# Patient Record
Sex: FEMALE | Race: WHITE | Employment: OTHER | RURAL
[De-identification: names, ages, dates, MRNs, and addresses within clinical notes are randomized per-mention and may not be internally consistent; named-entity substitution may affect disease eponyms.]

---

## 2017-02-01 ENCOUNTER — OFFICE VISIT (OUTPATIENT)
Dept: FAMILY MEDICINE CLINIC | Age: 72
End: 2017-02-01

## 2017-02-01 VITALS
HEIGHT: 67 IN | SYSTOLIC BLOOD PRESSURE: 122 MMHG | TEMPERATURE: 97 F | DIASTOLIC BLOOD PRESSURE: 60 MMHG | RESPIRATION RATE: 16 BRPM | BODY MASS INDEX: 30.29 KG/M2 | OXYGEN SATURATION: 97 % | WEIGHT: 193 LBS | HEART RATE: 63 BPM

## 2017-02-01 DIAGNOSIS — Z00.00 MEDICARE ANNUAL WELLNESS VISIT, SUBSEQUENT: Primary | ICD-10-CM

## 2017-02-01 DIAGNOSIS — Z13.39 SCREENING FOR ALCOHOLISM: ICD-10-CM

## 2017-02-01 NOTE — MR AVS SNAPSHOT
Visit Information Date & Time Provider Department Dept. Phone Encounter #  
 2/1/2017 11:30 AM Bal Velazquez MD [address] [place] [address] [id number] Follow-up Instructions Return in about 3 months (around 5/1/2017). Your Appointments 4/25/2017 10:10 AM  
ROUTINE CARE with Bal Velazquez MD  
[address]) Appt Note: 4 month f/u CHOL, ASTHMA, (fasting); provider cancellation on 04/18/2017-letter mailed [address] [address]  
[facility] [address] [address] Upcoming Health Maintenance Date Due  
 MEDICARE YEARLY EXAM 10/13/2016 COLONOSCOPY 1/16/2017 GLAUCOMA SCREENING Q2Y 7/6/2018 BREAST CANCER SCRN MAMMOGRAM 10/30/2018 Bone Densitometry 10/31/2019 DTaP/Tdap/Td series (2 - Td) 7/13/2021 Allergies as of 2/1/2017  Review Complete On: 2/1/2017 By: Bal Velazquez MD  
 No Known Allergies Current Immunizations  Reviewed on 6/6/2016 Name Date H1N1 FLU VACCINE 11/25/2009 Influenza Vaccine 10/13/2015, 10/3/2013 Influenza Vaccine (Quad) PF 12/13/2016, 11/20/2014 Influenza Vaccine Split 10/8/2012, 12/15/2011 Influenza Vaccine Whole 11/3/2010, 10/8/2009 Pneumococcal Conjugate (PCV-13) 11/10/2015 Pneumococcal Vaccine (Pcv) 10/1/2006 Pneumococcal Vaccine (Unspecified Type) 2/21/2011 TDAP Vaccine 7/13/2011 Zoster Vaccine, Live 1/21/2013 Not reviewed this visit You Were Diagnosed With   
  
 Codes Comments Medicare annual wellness visit, subsequent    -  Primary ICD-10-CM: Z00.00 ICD-9-CM: V70.0 Screening for alcoholism     ICD-10-CM: Z13.89 ICD-9-CM: V79.1 Vitals BP Pulse Temp Resp Height(growth percentile) Weight(growth percentile)  122/60 (BP 1 Location: Right arm, BP Patient Position: Sitting) 63 97 °F (36.1 °C) 16 5' 6.5\" (1.689 m) 193 lb (87.5 kg)  
 SpO2 BMI OB Status Smoking Status 97% 30.68 kg/m2 Hysterectomy Never Smoker Vitals History BMI and BSA Data Body Mass Index Body Surface Area  
 30.68 kg/m 2 2.03 m 2 Preferred Pharmacy Pharmacy Name Phone [address] [place], [place] - [address] [phone] Your Updated Medication List  
  
   
This list is accurate as of: 2/1/17 12:10 PM.  Always use your most recent med list.  
  
  
  
  
 * albuterol 90 mcg/actuation inhaler Commonly known as:  PROVENTIL HFA, VENTOLIN HFA, PROAIR HFA Take 2 Puffs by inhalation every four (4) hours as needed for Wheezing. Indications: ACUTE ASTHMA ATTACK * albuterol sulfate 2.5 mg/0.5 mL Nebu nebulizer solution Commonly known as:  PROVENTIL;VENTOLIN  
0.5 mL by Nebulization route every four (4) hours as needed for Wheezing. Indications: ACUTE ASTHMA ATTACK, BRONCHOSPASM PREVENTION  
  
 aspirin delayed-release 81 mg tablet Take 81 mg by mouth daily. ISAI-600 PO Take  by mouth. FISH OIL 1,000 mg Cap Generic drug:  omega-3 fatty acids-vitamin e Take 1 Cap by mouth two (2) times a day. fluticasone 110 mcg/actuation inhaler Commonly known as:  FLOVENT HFA Take 1 Puff by inhalation every twelve (12) hours. Indications: BRONCHIAL ASTHMA  
  
 mometasone 50 mcg/actuation nasal spray Commonly known as:  NASONEX  
2 Sprays by Both Nostrils route daily. niacin  mg tablet Commonly known as:  Barros Stalling Take 1 Tab by mouth daily. Indications: HYPERCHOLESTEROLEMIA Omeprazole delayed release 20 mg tablet Commonly known as:  PRILOSEC D/R Take 1 Tab by mouth daily. simvastatin 40 mg tablet Commonly known as:  ZOCOR Take 1 Tab by mouth nightly. traMADol 50 mg tablet Commonly known as:  ULTRAM  
Take 1 tablet by mouth every eight (8) hours as needed for Pain.  
  
 triamcinolone acetonide 0.1 % topical cream  
Commonly known as:  KENALOG Apply to arms bid when itching. VITAMIN B-100 COMPLEX PO Take  by mouth. VITAMIN D 2,000 unit Cap capsule Generic drug:  Cholecalciferol (Vitamin D3) Take  by mouth. * Notice: This list has 2 medication(s) that are the same as other medications prescribed for you. Read the directions carefully, and ask your doctor or other care provider to review them with you. We Performed the Following [place] ANNUAL ALCOHOL SCREEN 15 MIN [id number] [facility]] [id number] [facility] [ [facility]] Follow-up Instructions Return in about 3 months (around 5/1/2017). Patient Instructions Well Visit, Over 72: Care Instructions Your Care Instructions Physical exams can help you stay healthy. Your doctor has checked your overall health and may have suggested ways to take good care of yourself. He or she also may have recommended tests. At home, you can help prevent illness with healthy eating, regular exercise, and other steps. Follow-up care is a key part of your treatment and safety. Be sure to make and go to all appointments, and call your doctor if you are having problems. It's also a good idea to know your test results and keep a list of the medicines you take. How can you care for yourself at home? · Reach and stay at a healthy weight. This will lower your risk for many problems, such as obesity, diabetes, heart disease, and high blood pressure. · Get at least 30 minutes of exercise on most days of the week. Walking is a good choice. You also may want to do other activities, such as running, swimming, cycling, or playing tennis or team sports. · Do not smoke. Smoking can make health problems worse. If you need help quitting, talk to your doctor about stop-smoking programs and medicines. These can increase your chances of quitting for good. · Protect your skin from too much sun.  When you're outdoors from 10 a.m. to 4 p.m., stay in the shade or cover up with clothing and a hat with a wide brim. Wear sunglasses that block UV rays. Even when it's cloudy, put broad-spectrum sunscreen (SPF 30 or higher) on any exposed skin. · See a dentist one or two times a year for checkups and to have your teeth cleaned. · Wear a seat belt in the car. · Limit alcohol to 2 drinks a day for men and 1 drink a day for women. Too much alcohol can cause health problems. Follow your doctor's advice about when to have certain tests. These tests can spot problems early. For men and women · Cholesterol. Your doctor will tell you how often to have this done based on your overall health and other things that can increase your risk for heart attack and stroke. · Blood pressure. Have your blood pressure checked during a routine doctor visit. Your doctor will tell you how often to check your blood pressure based on your age, your blood pressure results, and other factors. · Diabetes. Ask your doctor whether you should have tests for diabetes. · Vision. Experts recommend that you have yearly exams for glaucoma and other age-related eye problems. · Hearing. Tell your doctor if you notice any change in your hearing. You can have tests to find out how well you hear. · Colon cancer tests. Keep having colon cancer tests as your doctor recommends. You can have one of several types of tests. · Heart attack and stroke risk. At least every 4 to 6 years, you should have your risk for heart attack and stroke assessed. Your doctor uses factors such as your age, blood pressure, cholesterol, and whether you smoke or have diabetes to show what your risk for a heart attack or stroke is over the next 10 years. · Osteoporosis. Talk to your doctor about whether you should have a bone density test to find out whether you have thinning bones. Also ask your doctor about whether you should take calcium and vitamin D supplements. For women · Pap test and pelvic exam. You may no longer need a Pap test. Talk with your doctor about whether to stop or continue to have Pap tests. · Breast exam and mammogram. Ask how often you should have a mammogram, which is an X-ray of your breasts. A mammogram can spot breast cancer before it can be felt and when it is easiest to treat. · Thyroid disease. Talk to your doctor about whether to have your thyroid checked as part of a regular physical exam. Women have an increased chance of a thyroid problem. For men · Prostate exam. Talk to your doctor about whether you should have a blood test (called a PSA test) for prostate cancer. Experts disagree on whether men should have this test. Some experts recommend that you discuss the benefits and risks of the test with your doctor. · Abdominal aortic aneurysm. Ask your doctor whether you should have a test to check for an aneurysm. You may need a test if you ever smoked or if your parent, brother, sister, or child has had an aneurysm. When should you call for help? Watch closely for changes in your health, and be sure to contact your doctor if you have any problems or symptoms that concern you. Where can you learn more? Go to http://chuck-hank.info/. Enter N846 in the search box to learn more about \"Well Visit, Over 65: Care Instructions. \" Current as of: July 19, 2016 Content Version: 11.1 © 5982-1250 [employer]. Care instructions adapted under license by [employer] (which disclaims liability or warranty for this information). If you have questions about a medical condition or this instruction, always ask your healthcare professional. [place] [address] any warranty or liability for your use of this information. Introducing [facility]! Dear Elsa Barillas: 
Thank you for requesting a Dataslide account. Our records indicate that you have previously registered for a Dataslide account but its currently inactive. Please call our Dataslide support line at [phone]. Additional Information If you have questions, please visit the Frequently Asked Questions section of the [employer] website at [email or web address] Remember, [employer] is NOT to be used for urgent needs. For medical emergencies, dial 911. Now available from your iPhone and Android! Please provide this summary of care documentation to your next provider. Your primary care clinician is listed as [address]. If you have any questions after today's visit, please call [phone].

## 2017-02-01 NOTE — PROGRESS NOTES
Reviewed record in preparation for visit and have necessary documentation  Pt did not bring medication to office visit for review   Advanced Directives, Living Will on file and info current  opportunity was given for questions  Goals that were addressed and/or need to be completed during or after this appointment include   Health Maintenance Due   Topic Date Due    MEDICARE YEARLY EXAM  10/13/2016    COLONOSCOPY  01/16/2017

## 2017-02-01 NOTE — PROGRESS NOTES
[address], [address] [place] [address]  [phone]           Date of visit: 2/1/2017     Jairo Tariq MD    This is an Subsequent Medicare Annual Wellness Visit (AWV), (Performed more than 12 months after effective date of Medicare Part B enrollment and 12 months after last preventive visit, Once in a lifetime)    I have reviewed the patient's medical history in detail and updated the computerized patient record. La Siegel is a 66 y.o. female   History obtained from: the patient. Concerns today   (Patient understands that medical problems addressed today may incur additional cost as this is a preventive visit)    History     Patient Active Problem List   Diagnosis Code    Hypercholesteremia E78.00    CVA (cerebral vascular accident) ([address] I63.9    Asthma J45.909    Asthmatic bronchitis J45.909    Bronchitis, mucopurulent recurrent ([address] J41.1    Vitamin D deficiency E55.9    Arthritis pain M19.90     Past Medical History   Diagnosis Date    Asthma 4/5/2010    Bronchitis, mucopurulent recurrent ([address] 5/14/2010    CVA (cerebral vascular accident) ([address] 4/5/2010    Hypercholesteremia 4/5/2010      Past Surgical History   Procedure Laterality Date    Hx hysterectomy      Hx tonsillectomy       No Known Allergies  Current Outpatient Prescriptions   Medication Sig Dispense Refill    niacin ER (NIASPAN) 500 mg tablet Take 1 Tab by mouth daily. Indications: HYPERCHOLESTEROLEMIA 90 Tab 3    simvastatin (ZOCOR) 40 mg tablet Take 1 Tab by mouth nightly. 90 Tab 3    Omeprazole delayed release (PRILOSEC D/R) 20 mg tablet Take 1 Tab by mouth daily. 30 Tab 5    albuterol (PROVENTIL HFA, VENTOLIN HFA, PROAIR HFA) 90 mcg/actuation inhaler Take 2 Puffs by inhalation every four (4) hours as needed for Wheezing.  Indications: ACUTE ASTHMA ATTACK 1 Inhaler 5    albuterol sulfate (PROVENTIL;VENTOLIN) 2.5 mg/0.5 mL nebu nebulizer solution 0.5 mL by Nebulization route every four (4) hours as needed for Wheezing. Indications: ACUTE ASTHMA ATTACK, BRONCHOSPASM PREVENTION 20 mL 0    fluticasone (FLOVENT HFA) 110 mcg/actuation inhaler Take 1 Puff by inhalation every twelve (12) hours. Indications: BRONCHIAL ASTHMA 1 Inhaler 5    omega-3 fatty acids-vitamin e (FISH OIL) 1,000 mg cap Take 1 Cap by mouth two (2) times a day.  triamcinolone acetonide (KENALOG) 0.1 % topical cream Apply to arms bid when itching. 454 g 0    aspirin delayed-release 81 mg tablet Take 81 mg by mouth daily.  CALCIUM CARBONATE (ISAI-600 PO) Take  by mouth.  Cholecalciferol, Vitamin D3, (VITAMIN D) 2,000 unit Cap Take  by mouth.  VITAMIN B COMPLEX (VITAMIN B-100 COMPLEX PO) Take  by mouth.  mometasone (NASONEX) 50 mcg/actuation nasal spray 2 Sprays by Both Nostrils route daily. 1 Container 6    traMADol (ULTRAM) 50 mg tablet Take 1 tablet by mouth every eight (8) hours as needed for Pain. 30 tablet 3     Family History   Problem Relation Age of Onset    Stroke Mother     Heart Disease Father      Social History   Substance Use Topics    Smoking status: Never Smoker    Smokeless tobacco: Never Used    Alcohol use No       Specialists/Care Team   Sunitha Garcia has established care with the following healthcare providers:  Patient Care Team:  Bal Velazquez MD as PCP - [place] [address]     Demographics   female  66 y.o.     General Health Questions   -During the past 4 weeks:   -how would you rate your health in general? Good   -how often have you been bothered by feeling dizzy when standing up? never   -how much have you been bothered by bodily pain? mildly   -Have you noticed any hearing difficulties? no   -has your physical and emotional health limited your social activities with family or friends? no    Emotional Health Questions   -Do you have a history of depression, anxiety, or emotional problems? no  -Over the past 2 weeks, have you felt down, depressed or hopeless? no  -Over the past 2 weeks, have you felt little interest or pleasure in doing things? no    Health Habits   Please describe your diet habits: B grade  Do you get 5 servings of fruits or vegetables daily? no  Do you exercise regularly? no    Alcohol Risk Factor Screening: On any occasion during the past 3 months, have you had more than 4 drinks containing alcohol? No     Do you average more than 14 drinks per week? No       Activities of Daily Living and Functional Status   -Do you need help with eating, walking, dressing, bathing, toileting, the phone, transportation, shopping, preparing meals, housework, laundry, medications or managing money? no  -In the past four weeks, was someone available to help you if you needed and wanted help with anything? yes  -Are you confident are you that you can control and manage most of your health problems? yes  -Have you been given information to help you keep track of your medications? yes  -How often do you have trouble taking your medications as prescribed? occasionally    Fall Risk and Home Safety   Have you fallen 2 or more times in the past year? no  Does your home have rugs in the hallway, lack grab bars in the bathroom, lack handrails on the stairs or have poor lighting? yes  Do you have smoke detectors and check them regularly? yes  Do you have difficulties driving a car? no  Do you always fasten your seat belt when you are in a car? yes    Review of Systems (if indicated for problems addressed today)   Review of Systems   Constitutional: Negative. Negative for chills, fever, malaise/fatigue and weight loss. HENT: Negative. Negative for hearing loss. Eyes: Negative. Negative for blurred vision and double vision. Respiratory: Negative. Negative for cough, hemoptysis, sputum production and shortness of breath. Cardiovascular: Negative. Negative for chest pain, palpitations and orthopnea. Gastrointestinal: Negative. Negative for abdominal pain, blood in stool, heartburn, nausea and vomiting. Genitourinary: Negative. Negative for dysuria, frequency and urgency. Musculoskeletal: Negative. Negative for back pain, myalgias and neck pain. Skin: Negative. Negative for rash. Neurological: Negative. Negative for dizziness, tingling, tremors, weakness and headaches. Endo/Heme/Allergies: Negative. Psychiatric/Behavioral: Negative. Negative for depression. Physical Examination     Wt Readings from Last 3 Encounters:   02/01/17 193 lb (87.5 kg)   12/13/16 192 lb (87.1 kg)   06/06/16 192 lb 9.6 oz (87.4 kg)     Temp Readings from Last 3 Encounters:   02/01/17 97 °F (36.1 °C)   12/13/16 97.9 °F (36.6 °C) (Oral)   06/06/16 97.9 °F (36.6 °C) (Oral)     BP Readings from Last 3 Encounters:   02/01/17 122/60   12/13/16 135/76   06/06/16 115/76     Pulse Readings from Last 3 Encounters:   02/01/17 63   12/13/16 65   06/06/16 66       Body mass index is 30.68 kg/(m^2). No exam data present  Was the patient's timed Up & Go test unsteady or longer than 10 seconds? no    Evaluation of Cognitive Function   Mood/affect:  happy  Orientation: Person, Place, Time and Situation  Appearance: age appropriate and casually dressed  Family member/caregiver input: no  MMSE 100% although she feels forgetful at times. She will contact me for neuropsychiatric testing if she feels like her memory is getting worse. Additional exam if indicated for problems addressed today:  Physical Exam   Constitutional: She is oriented to person, place, and time. She appears well-developed and well-nourished. No distress. HENT:   Head: Normocephalic and atraumatic. Mouth/Throat: Oropharynx is clear and moist.   Eyes: Conjunctivae are normal. No scleral icterus. Revision is 20/50 in the right eye and 20/25 in the left eye with her glasses in the chart and arms length.   She apparently has some cataracts scarring in the right eye after cataract removal.  This is clearly affected her vision in the right eye. Neck: No thyromegaly present. No carotid bruit. Cardiovascular: Normal rate, regular rhythm and normal heart sounds. Exam reveals no gallop and no friction rub. No murmur heard. Pulmonary/Chest: Effort normal and breath sounds normal. She has no wheezes. She has no rales. Abdominal: Soft. Bowel sounds are normal. She exhibits no distension. There is no tenderness. There is no rebound and no guarding. Musculoskeletal: She exhibits no edema. Lymphadenopathy:     She has no cervical adenopathy. Neurological: She is alert and oriented to person, place, and time. Skin: Skin is warm and dry. No rash noted. She is not diaphoretic. Psychiatric: She has a normal mood and affect. Her behavior is normal. Thought content normal.   Nursing note and vitals reviewed. Advice/Referrals/Counseling (as indicated)   Education and counseling provided for any problems identified above:     Preventive Services     (Preventive care checklist to be included in patient instructions)  Discussed today Done Previously Not Needed    x   Pneumococcal vaccines    x  Flu vaccine     x Hepatitis B vaccine (if at risk)      Shingles vaccine    x  TDAP vaccine    x  Mammogram     x Pap smear   x   Colorectal cancer screening     x Low-dose CT for lung cancer screening    x  Bone density test    x  Glaucoma screening    x  Cholesterol test    x  Diabetes screening test      x Diabetes self-management class     x Nutritionist referral for diabetes or renal disease     Discussion of Advance Directive   Discussed with Agustin Rogers. Jameson Baumgarten her ability to prepare and advance directive in the case that an injury or illness causes her to be unable to make health care decisions. Assessment/Plan   Z00.00    ICD-10-CM ICD-9-CM    1. Medicare annual wellness visit, subsequent Z00.00 V70.0 [place] ANNUAL ALCOHOL SCREEN 15 MIN      MN DEPRESSION SCREEN ANNUAL   2.  Screening for alcoholism Z13.89 V79.1        Orders Placed This Encounter    ME ANNUAL ALCOHOL SCREEN [address]       Patient Care Team:  Ursula San MD as PCP - General    Follow-up Disposition:  Return in about 3 months (around 5/1/2017).     Future Appointments  Date Time Provider Department Center   4/25/2017 10:10 AM MD Brittany Mcgee MD

## 2017-02-01 NOTE — PATIENT INSTRUCTIONS

## 2017-02-09 ENCOUNTER — TELEPHONE (OUTPATIENT)
Dept: FAMILY MEDICINE CLINIC | Age: 72
End: 2017-02-09

## 2017-02-09 DIAGNOSIS — Z12.11 COLON CANCER SCREENING: Primary | ICD-10-CM

## 2017-02-09 NOTE — TELEPHONE ENCOUNTER
Patient called requesting that we refer her to Dr. Shanti York for a routine colonoscopy. She can be reached at [phone].

## 2017-04-25 ENCOUNTER — OFFICE VISIT (OUTPATIENT)
Dept: FAMILY MEDICINE CLINIC | Age: 72
End: 2017-04-25

## 2017-04-25 VITALS
RESPIRATION RATE: 20 BRPM | TEMPERATURE: 98.2 F | DIASTOLIC BLOOD PRESSURE: 69 MMHG | HEIGHT: 67 IN | WEIGHT: 194.6 LBS | BODY MASS INDEX: 30.54 KG/M2 | HEART RATE: 64 BPM | OXYGEN SATURATION: 96 % | SYSTOLIC BLOOD PRESSURE: 145 MMHG

## 2017-04-25 DIAGNOSIS — E78.00 HYPERCHOLESTEREMIA: Primary | Chronic | ICD-10-CM

## 2017-04-25 DIAGNOSIS — J45.20 MILD INTERMITTENT ASTHMA WITHOUT COMPLICATION: Chronic | ICD-10-CM

## 2017-04-25 DIAGNOSIS — I63.9 CEREBROVASCULAR ACCIDENT (CVA), UNSPECIFIED MECHANISM (HCC): ICD-10-CM

## 2017-04-25 DIAGNOSIS — E55.9 VITAMIN D DEFICIENCY: ICD-10-CM

## 2017-04-25 NOTE — PATIENT INSTRUCTIONS
Cholesterol and Triglycerides Tests: About These Tests  What are they? Cholesterol and triglycerides tests measure the amount of fats in your blood. These fats have both \"good\" (HDL) and \"bad\" (LDL) cholesterol. Why are these tests done? These tests are done to help find out your risk of a heart attack and stroke. They can help your doctor find out how well medicine is working for some health problems. How can you prepare for these tests? · Your doctor may tell you to fast before your tests. This means that you do not eat or drink anything except water for 9 to 12 hours before the tests. In most cases, you can take your medicines with water the morning of the test.  · Do not eat high-fat foods the night before the tests. · Do not drink alcohol or do intense exercise the night before the tests. · Be sure to tell your doctor about all the over-the-counter and prescription medicines and herbs or other supplements you take. They can affect the results of these tests. What happens during these tests? A health professional takes a sample of your blood. What else should you know about these tests? Your cholesterol levels can help your doctor find out your risk for having a heart attack or stroke. But it's not just about your cholesterol. Your doctor uses your cholesterol levels plus other things to calculate your risk. These include:  · Your blood pressure. · Whether or not you have diabetes. · Your age, sex, and race. · Whether or not you smoke. You and your doctor can talk about whether you need to lower your risk and what treatment is best for you. Where can you learn more? Go to http://chuck-hank.info/. Enter S822 in the search box to learn more about \"Cholesterol and Triglycerides Tests: About These Tests. \"  Current as of: June 30, 2016  Content Version: 11.2  © 8209-5377 [employer].  Care instructions adapted under license by [employer] (which disclaims liability or warranty for this information). If you have questions about a medical condition or this instruction, always ask your healthcare professional. [address] any warranty or liability for your use of this information.

## 2017-04-25 NOTE — MR AVS SNAPSHOT
Visit Information Date & Time Provider Department Dept. Phone Encounter #  
 4/25/2017 10:10 AM Rah Goodman MD [address] [id number] Follow-up Instructions Return in about 6 months (around 10/25/2017). Upcoming Health Maintenance Date Due  
 MEDICARE YEARLY EXAM 2/2/2018 GLAUCOMA SCREENING Q2Y 7/6/2018 BREAST CANCER SCRN MAMMOGRAM 10/30/2018 Bone Densitometry 10/31/2019 DTaP/Tdap/Td series (2 - Td) 7/13/2021 COLONOSCOPY 3/17/2027 Allergies as of 4/25/2017  Review Complete On: 4/25/2017 By: Gucci Jones No Known Allergies Current Immunizations  Reviewed on 6/6/2016 Name Date H1N1 FLU VACCINE 11/25/2009 Influenza Vaccine 10/13/2015, 10/3/2013 Influenza Vaccine (Quad) PF 12/13/2016, 11/20/2014 Influenza Vaccine Split 10/8/2012, 12/15/2011 Influenza Vaccine Whole 11/3/2010, 10/8/2009 Pneumococcal Conjugate (PCV-13) 11/10/2015 Pneumococcal Vaccine (Pcv) 10/1/2006 Pneumococcal Vaccine (Unspecified Type) 2/21/2011 TDAP Vaccine 7/13/2011 Zoster Vaccine, Live 1/21/2013 Not reviewed this visit You Were Diagnosed With   
  
 Codes Comments Hypercholesteremia    -  Primary ICD-10-CM: E78.00 ICD-9-CM: 272.0 Mild intermittent asthma without complication     KWA-83-KU: J45.20 ICD-9-CM: 493.90 Vitamin D deficiency     ICD-10-CM: E55.9 ICD-9-CM: 268.9 Cerebrovascular accident (CVA), unspecified mechanism ([facility]     ICD-10-CM: I63.9 ICD-9-CM: 434.91 Vitals BP Pulse Temp Resp Height(growth percentile) Weight(growth percentile) 145/69 64 98.2 °F (36.8 °C) (Oral) 20 5' 6.5\" (1.689 m) 194 lb 9.6 oz (88.3 kg) SpO2 BMI OB Status Smoking Status 96% 30.94 kg/m2 Hysterectomy Never Smoker Vitals History BMI and BSA Data Body Mass Index Body Surface Area 30.94 kg/m 2 2.04 m 2 Preferred Pharmacy Pharmacy Name Phone [address] [place], [place] - [address] [phone] Your Updated Medication List  
  
   
This list is accurate as of: 4/25/17 10:57 AM.  Always use your most recent med list.  
  
  
  
  
 * albuterol 90 mcg/actuation inhaler Commonly known as:  PROVENTIL HFA, VENTOLIN HFA, PROAIR HFA Take 2 Puffs by inhalation every four (4) hours as needed for Wheezing. Indications: ACUTE ASTHMA ATTACK * albuterol sulfate 2.5 mg/0.5 mL Nebu nebulizer solution Commonly known as:  PROVENTIL;VENTOLIN  
0.5 mL by Nebulization route every four (4) hours as needed for Wheezing. Indications: ACUTE ASTHMA ATTACK, BRONCHOSPASM PREVENTION  
  
 aspirin delayed-release 81 mg tablet Take 81 mg by mouth daily. ISAI-600 PO Take  by mouth. FISH OIL 1,000 mg Cap Generic drug:  omega-3 fatty acids-vitamin e Take 1 Cap by mouth two (2) times a day. fluticasone 110 mcg/actuation inhaler Commonly known as:  FLOVENT HFA Take 1 Puff by inhalation every twelve (12) hours. Indications: BRONCHIAL ASTHMA  
  
 mometasone 50 mcg/actuation nasal spray Commonly known as:  NASONEX  
2 Sprays by Both Nostrils route daily. niacin  mg tablet Commonly known as:  Samina Solum Take 1 Tab by mouth daily. Indications: HYPERCHOLESTEROLEMIA Omeprazole delayed release 20 mg tablet Commonly known as:  PRILOSEC D/R Take 1 Tab by mouth daily. simvastatin 40 mg tablet Commonly known as:  ZOCOR  
TAKE 1 TABLET BY MOUTH NIGHTLY  
  
 traMADol 50 mg tablet Commonly known as:  ULTRAM  
Take 1 tablet by mouth every eight (8) hours as needed for Pain.  
  
 triamcinolone acetonide 0.1 % topical cream  
Commonly known as:  KENALOG Apply to arms bid when itching. VITAMIN B-100 COMPLEX PO Take  by mouth. VITAMIN D 2,000 unit Cap capsule Generic drug:  Cholecalciferol (Vitamin D3) Take  by mouth. * Notice:   This list has 2 medication(s) that are the same as other medications prescribed for you. Read the directions carefully, and ask your doctor or other care provider to review them with you. We Performed the Following HEMOGLOBIN [id number] CPT(R)] LIPID PANEL [93587 CPT(R)] METABOLIC PANEL, COMPREHENSIVE [52478 CPT(R)] VITAMIN D, 25 HYDROXY [id number] CPT(R)] Follow-up Instructions Return in about 6 months (around 10/25/2017). Patient Instructions Cholesterol and Triglycerides Tests: About These Tests What are they? Cholesterol and triglycerides tests measure the amount of fats in your blood. These fats have both \"good\" (HDL) and \"bad\" (LDL) cholesterol. Why are these tests done? These tests are done to help find out your risk of a heart attack and stroke. They can help your doctor find out how well medicine is working for some health problems. How can you prepare for these tests? · Your doctor may tell you to fast before your tests. This means that you do not eat or drink anything except water for 9 to 12 hours before the tests. In most cases, you can take your medicines with water the morning of the test. 
· Do not eat high-fat foods the night before the tests. · Do not drink alcohol or do intense exercise the night before the tests. · Be sure to tell your doctor about all the over-the-counter and prescription medicines and herbs or other supplements you take. They can affect the results of these tests. What happens during these tests? A health professional takes a sample of your blood. What else should you know about these tests? Your cholesterol levels can help your doctor find out your risk for having a heart attack or stroke. But it's not just about your cholesterol. Your doctor uses your cholesterol levels plus other things to calculate your risk. These include: 
· Your blood pressure. · Whether or not you have diabetes. · Your age, sex, and race. · Whether or not you smoke.  
You and your doctor can talk about whether you need to lower your risk and what treatment is best for you. Where can you learn more? Go to http://chuck-hank.info/. Enter B384 in the search box to learn more about \"Cholesterol and Triglycerides Tests: About These Tests. \" Current as of: June 30, 2016 Content Version: 11.2 © 7300-8431 [employer]. Care instructions adapted under license by [employer] (which disclaims liability or warranty for this information). If you have questions about a medical condition or this instruction, always ask your healthcare professional. [address] any warranty or liability for your use of this information. Introducing [facility]! Dear Laurence Many: 
Thank you for requesting a Mindie account. Our records indicate that you have previously registered for a Mindie account but its currently inactive. Please call our Mindie support line at [phone]. Additional Information If you have questions, please visit the Frequently Asked Questions section of the Mindie website at [email or web address] Remember, Mindie is NOT to be used for urgent needs. For medical emergencies, dial 911. Now available from your iPhone and Android! Please provide this summary of care documentation to your next provider. Your primary care clinician is listed as [address]. If you have any questions after today's visit, please call [phone].

## 2017-04-26 LAB
25(OH)D3+25(OH)D2 SERPL-MCNC: 36.3 NG/ML (ref 30–100)
ALBUMIN SERPL-MCNC: 4.4 G/DL (ref 3.5–4.8)
ALBUMIN/GLOB SERPL: 2 {RATIO} (ref 1.2–2.2)
ALP SERPL-CCNC: 76 IU/L (ref 39–117)
ALT SERPL-CCNC: 21 IU/L (ref 0–32)
AST SERPL-CCNC: 24 IU/L (ref 0–40)
BILIRUB SERPL-MCNC: 0.8 MG/DL (ref 0–1.2)
BUN SERPL-MCNC: 16 MG/DL (ref 8–27)
BUN/CREAT SERPL: 20 (ref 12–28)
CALCIUM SERPL-MCNC: 9.2 MG/DL (ref 8.7–10.3)
CHLORIDE SERPL-SCNC: 100 MMOL/L (ref 96–106)
CHOLEST SERPL-MCNC: 174 MG/DL (ref 100–199)
CO2 SERPL-SCNC: 24 MMOL/L (ref 18–29)
CREAT SERPL-MCNC: 0.82 MG/DL (ref 0.57–1)
GLOBULIN SER CALC-MCNC: 2.2 G/DL (ref 1.5–4.5)
GLUCOSE SERPL-MCNC: 88 MG/DL (ref 65–99)
HDLC SERPL-MCNC: 60 MG/DL
HGB BLD-MCNC: 12.7 G/DL (ref 11.1–15.9)
LDLC SERPL CALC-MCNC: 96 MG/DL (ref 0–99)
POTASSIUM SERPL-SCNC: 4.7 MMOL/L (ref 3.5–5.2)
PROT SERPL-MCNC: 6.6 G/DL (ref 6–8.5)
SODIUM SERPL-SCNC: 140 MMOL/L (ref 134–144)
TRIGL SERPL-MCNC: 90 MG/DL (ref 0–149)
VLDLC SERPL CALC-MCNC: 18 MG/DL (ref 5–40)

## 2017-04-26 NOTE — PROGRESS NOTES
Progress Note    Patient: Patrizia Mtz MRN: [id number]  SSN: [id number]    YOB: 1945  Age: 66 y.o. Sex: female        Chief Complaint   Patient presents with    Cholesterol Problem    Vitamin D Deficiency    Labs         Subjective:     Encounter Diagnoses   Name Primary?  Hypercholesteremia:  Cardiovascular risks for her are: LDL goal is under 100. Currently she takes Zocor (simvastatin) , 40 mg-I have suggested that she move up to Lipitor to better control her LDL-he wants to wait until the labs come back  Lab Results   Component Value Date/Time    Cholesterol, total 187 12/13/2016 11:17 AM    HDL Cholesterol 56 12/13/2016 11:17 AM    LDL, calculated 109 12/13/2016 11:17 AM    Triglyceride 112 12/13/2016 11:17 AM    CHOL/HDL Ratio 2.6 09/21/2010 08:03 AM     Lab Results   Component Value Date/Time    ALT (SGPT) 24 12/13/2016 11:17 AM    AST (SGOT) 23 12/13/2016 11:17 AM    Alk. phosphatase 75 12/13/2016 11:17 AM    Bilirubin, total 0.8 12/13/2016 11:17 AM      Myalgias: No   Fatigue: No   Other side effects: no  Wt Readings from Last 3 Encounters:   04/25/17 194 lb 9.6 oz (88.3 kg)   02/01/17 193 lb (87.5 kg)   12/13/16 192 lb (87.1 kg)     The patient is aware of our goal to reduce or eliminate the long term problems (such as strokes and heart attacks) related to poorly controlled hyperlipidemia. Yes    Mild intermittent asthma without complication:  asthma, not currently in exacerbation. Asthma symptoms occur: infrequently. Wheezing when present is described as mild and easily relieved with rescue bronchodilator. Current limitations in activity from asthma: none. Frequency of use of quick-relief meds: rare. Medication compliance: Good  Patient does not smoke cigarettes.   Monitors Peak Flow at home: no       Vitamin D deficiency:  No sx. corrected  Lab Results   Component Value Date/Time    Vitamin D 25-Hydroxy 26 09/21/2010 08:03 AM    VITAMIN D, 25-HYDROXY 48.2 12/13/2016 11:17 AM            Cerebrovascular accident (CVA), unspecified mechanism ([address]): she has no long-term side effects from her stroke and is had no symptoms of a TIA since that time. Current and past medical information:    Current Medications after this visit[de-identified]   Current Outpatient Prescriptions   Medication Sig    simvastatin (ZOCOR) 40 mg tablet TAKE 1 TABLET BY MOUTH NIGHTLY    niacin ER (NIASPAN) 500 mg tablet Take 1 Tab by mouth daily. Indications: HYPERCHOLESTEROLEMIA    Omeprazole delayed release (PRILOSEC D/R) 20 mg tablet Take 1 Tab by mouth daily.  albuterol (PROVENTIL HFA, VENTOLIN HFA, PROAIR HFA) 90 mcg/actuation inhaler Take 2 Puffs by inhalation every four (4) hours as needed for Wheezing. Indications: ACUTE ASTHMA ATTACK    albuterol sulfate (PROVENTIL;VENTOLIN) 2.5 mg/0.5 mL nebu nebulizer solution 0.5 mL by Nebulization route every four (4) hours as needed for Wheezing. Indications: ACUTE ASTHMA ATTACK, BRONCHOSPASM PREVENTION    fluticasone (FLOVENT HFA) 110 mcg/actuation inhaler Take 1 Puff by inhalation every twelve (12) hours. Indications: BRONCHIAL ASTHMA    omega-3 fatty acids-vitamin e (FISH OIL) 1,000 mg cap Take 1 Cap by mouth two (2) times a day.  triamcinolone acetonide (KENALOG) 0.1 % topical cream Apply to arms bid when itching.  aspirin delayed-release 81 mg tablet Take 81 mg by mouth daily.  CALCIUM CARBONATE (ISAI-600 PO) Take  by mouth.  Cholecalciferol, Vitamin D3, (VITAMIN D) 2,000 unit Cap Take  by mouth.  VITAMIN B COMPLEX (VITAMIN B-100 COMPLEX PO) Take  by mouth.  mometasone (NASONEX) 50 mcg/actuation nasal spray 2 Sprays by Both Nostrils route daily.  traMADol (ULTRAM) 50 mg tablet Take 1 tablet by mouth every eight (8) hours as needed for Pain. No current facility-administered medications for this visit.         Patient Active Problem List    Diagnosis Date Noted    Arthritis pain 12/09/2014    Vitamin D deficiency 01/07/2014    [facility], [facility] 05/14/2010    Hypercholesteremia 04/05/2010    CVA [facility]cerebral vascular accident) [facility] 04/05/2010    Asthma 04/05/2010    Asthmatic bronchitis 04/05/2010       Past Medical History:   Diagnosis Date    Asthma 4/5/2010    [facility]opurulent [facility] 5/14/2010    CVA (cerebral vascular accident) ([facility] 4/5/2010    Hypercholesteremia 4/5/2010       No Known Allergies    Past Surgical History:   Procedure Laterality Date    HX HYSTERECTOMY      HX TONSILLECTOMY         Social History     Social History    Marital status:      Spouse name: N/A    Number of children: N/A    Years of education: N/A     Social History Main Topics    Smoking status: Never Smoker    Smokeless tobacco: Never Used    Alcohol use No    Drug use: No    Sexual activity: Not Asked     Other Topics Concern    None     Social History Narrative       Review of Systems   Constitutional: Negative. Negative for chills, fever, malaise/fatigue and weight loss. HENT: Negative. Negative for hearing loss. Eyes: Negative. Negative for blurred vision and double vision. Respiratory: Negative. Negative for cough, hemoptysis, sputum production and shortness of breath. Cardiovascular: Negative. Negative for chest pain, palpitations and orthopnea. Gastrointestinal: Negative. Negative for abdominal pain, blood in stool, heartburn, nausea and vomiting. Genitourinary: Negative. Negative for dysuria, frequency and urgency. Musculoskeletal: Negative. Negative for back pain, myalgias and neck pain. Skin: Negative. Negative for rash. Neurological: Negative. Negative for dizziness, tingling, tremors, weakness and headaches. Endo/Heme/Allergies: Negative. Psychiatric/Behavioral: Negative. Negative for depression.         Objective:     Vitals:    04/25/17 1028   BP: 145/69   Pulse: 64   Resp: 20   Temp: 98.2 °F (36.8 °C)   TempSrc: Oral   SpO2: 96% Weight: 194 lb 9.6 oz (88.3 kg)   Height: 5' 6.5\" (1.689 m)      Body mass index is 30.94 kg/(m^2). Physical Exam   Constitutional: She is oriented to person, place, and time and well-developed, well-nourished, and in no distress. No distress. HENT:   Head: Normocephalic and atraumatic. Mouth/Throat: Oropharynx is clear and moist.   Eyes: Conjunctivae are normal.   Neck: No tracheal deviation present. No thyromegaly present. Cardiovascular: Normal rate, regular rhythm and normal heart sounds. No murmur heard. Pulmonary/Chest: Effort normal and breath sounds normal. No respiratory distress. Abdominal: Soft. She exhibits no distension. Lymphadenopathy:     She has no cervical adenopathy. Neurological: She is alert and oriented to person, place, and time. Skin: Skin is warm. No rash noted. She is not diaphoretic. No erythema. Psychiatric: Mood and affect normal.   Nursing note and vitals reviewed. There are no preventive care reminders to display for this patient. Assessment and orders:       ICD-10-CM ICD-9-CM    1. Hypercholesteremia G60.08 491.4 METABOLIC PANEL, COMPREHENSIVE      LIPID PANEL      HEMOGLOBIN   2. Mild intermittent asthma without complication N34.53 048.68    3. Vitamin D deficiency E55.9 268.9 VITAMIN D, 25 HYDROXY   4. Cerebrovascular accident (CVA), unspecified mechanism ([facility] I63.9 434.91 Recommended increasing potency of statin to Lipitor or Crestor         Plan of care:  Discussed diagnoses in detail with patient. Medication risks/benefits/side effects discussed with patient. All of the patient's questions were addressed. The patient understands and agrees with our plan of care. The patient knows to call back if they are unsure of or forget any changes we discussed today or if the symptoms change.      The patient received an After-Visit Summary which contains VS, orders, medication list and allergy list. This can be used as a \"mini-medical record\" should they have to seek medical care while out of town. Patient Care Team:  Reginald Mata MD as PCP - General    Follow-up Disposition:  Return in about 6 months (around 10/25/2017).     Future Appointments  Date Time Provider Jero Amaris   10/25/2017 9:35 AM Reginald Mata MD [facility] SCHED       Signed By: Reginald Mata MD     April 25, 2017

## 2017-05-10 ENCOUNTER — TELEPHONE (OUTPATIENT)
Dept: FAMILY MEDICINE CLINIC | Age: 72
End: 2017-05-10

## 2017-05-10 DIAGNOSIS — E78.00 HYPERCHOLESTEREMIA: Primary | Chronic | ICD-10-CM

## 2017-05-10 RX ORDER — ATORVASTATIN CALCIUM 40 MG/1
40 TABLET, FILM COATED ORAL DAILY
Qty: 30 TAB | Refills: 5 | Status: SHIPPED | OUTPATIENT
Start: 2017-05-10 | End: 2017-12-07 | Stop reason: SDUPTHER

## 2017-05-10 NOTE — TELEPHONE ENCOUNTER
Pt states Dr. Naeem Good wants her to go on Lipitor instead of Simvastatin. Need a prescription please. [facility]  cell  Willam [address]

## 2017-05-11 NOTE — TELEPHONE ENCOUNTER
Phone call to patient. Spoke with patient and informed her that Lipitor has been sent into the pharmacy and if she has any side effects such as muscle aches to let us know. If she can tolerate the medication to come to the lab in 3 months. Scheduled patient a fasting lab appointment on 8/18/17.

## 2017-08-18 DIAGNOSIS — E78.00 HYPERCHOLESTEREMIA: Primary | Chronic | ICD-10-CM

## 2017-08-19 LAB
ALBUMIN SERPL-MCNC: 4.1 G/DL (ref 3.5–4.8)
ALP SERPL-CCNC: 79 IU/L (ref 39–117)
ALT SERPL-CCNC: 26 IU/L (ref 0–32)
AST SERPL-CCNC: 24 IU/L (ref 0–40)
BILIRUB DIRECT SERPL-MCNC: 0.24 MG/DL (ref 0–0.4)
BILIRUB SERPL-MCNC: 0.9 MG/DL (ref 0–1.2)
CHOLEST SERPL-MCNC: 129 MG/DL (ref 100–199)
CK SERPL-CCNC: 197 U/L (ref 24–173)
HDLC SERPL-MCNC: 51 MG/DL
LDLC SERPL CALC-MCNC: 64 MG/DL (ref 0–99)
PROT SERPL-MCNC: 6.5 G/DL (ref 6–8.5)
TRIGL SERPL-MCNC: 70 MG/DL (ref 0–149)
VLDLC SERPL CALC-MCNC: 14 MG/DL (ref 5–40)

## 2017-09-11 ENCOUNTER — OFFICE VISIT (OUTPATIENT)
Dept: FAMILY MEDICINE CLINIC | Age: 72
End: 2017-09-11

## 2017-09-11 VITALS
RESPIRATION RATE: 16 BRPM | DIASTOLIC BLOOD PRESSURE: 73 MMHG | OXYGEN SATURATION: 94 % | HEART RATE: 69 BPM | BODY MASS INDEX: 30.92 KG/M2 | WEIGHT: 197 LBS | HEIGHT: 67 IN | TEMPERATURE: 97.3 F | SYSTOLIC BLOOD PRESSURE: 110 MMHG

## 2017-09-11 DIAGNOSIS — L02.818 CELLULITIS AND ABSCESS OF OTHER SPECIFIED SITE: Primary | ICD-10-CM

## 2017-09-11 DIAGNOSIS — Z23 ENCOUNTER FOR IMMUNIZATION: ICD-10-CM

## 2017-09-11 DIAGNOSIS — L03.818 CELLULITIS AND ABSCESS OF OTHER SPECIFIED SITE: Primary | ICD-10-CM

## 2017-09-11 RX ORDER — AMOXICILLIN AND CLAVULANATE POTASSIUM 500; 125 MG/1; MG/1
1 TABLET, FILM COATED ORAL 2 TIMES DAILY
COMMUNITY
End: 2017-10-25 | Stop reason: ALTCHOICE

## 2017-09-11 RX ORDER — DOXYCYCLINE 100 MG/1
CAPSULE ORAL
COMMUNITY
Start: 2017-09-04 | End: 2017-10-25 | Stop reason: ALTCHOICE

## 2017-09-11 NOTE — PATIENT INSTRUCTIONS

## 2017-09-11 NOTE — PROGRESS NOTES
CC: f/u cellulitis    HPI: Pt is a 74 y.o. female who presents for f/u cellulitis. She has a rash on her left shoulder that has now been present for 1.5 weeks, she noticed one night when it woke her up from sleep. Initially there was a bump that she scratched and the rash continued to grow and cause pain. After a few days she went to [facility] and got a shot of CTX and had the area marked. She was also started on Augmentin and doxycycline. They had her come back after 2 days and she got another shot and had the area marked again. Last Tuesday she put a piece of raw fat back on the shoulder and the next day the area opened and started draining green pus. She went back to [facility] after that and they did a wound culture for which we have no results yet. The redness has greatly improved and the drainage is also improving but continues to drain some. Initially she was having fever to 100.5 and N/V but this has resolved now.        Past Medical History:   Diagnosis Date    Asthma 4/5/2010    Bronchitis, mucopurulent recurrent ([address] 5/14/2010    CVA (cerebral vascular accident) ([facility] 4/5/2010    Hypercholesteremia 4/5/2010       Family History   Problem Relation Age of Onset    Stroke Mother     Heart Disease Father        Social History   Substance Use Topics    Smoking status: Never Smoker    Smokeless tobacco: Never Used    Alcohol use No       ROS:  Positive only when bolded  Constitutional: HA, F/C  Respiratory: SOB, wheezing, cough  Cardiovascular: CP, palpitations  Integument/breast: Changes in skin, moles or hair, rash    PE:  Visit Vitals    /73 (BP 1 Location: Right arm, BP Patient Position: Sitting)    Pulse 69    Temp 97.3 °F (36.3 °C) (Oral)    Resp 16    Ht 5' 6.5\" (1.689 m)    Wt 197 lb (89.4 kg)    SpO2 94%    BMI 31.32 kg/m2     Gen: Pt sitting in chair, in NAD  Head: Normocephalic, atraumatic  Eyes: Sclera anicteric, EOM grossly intact, PERRL  Throat: MMM, normal lips, tongue and gums  Neck: Supple  CVS: Normal S1, S2, no m/r/g  Resp: CTAB, no wheezes or rales  Extrem: Atraumatic, no cyanosis or edema  Pulses: 2+   Skin: Warm, dry. 3x7cm area of erythema on L shoulder with 2x2cm area of granulation tissue, no drainage or streaking. Neuro: Alert, oriented, appropriate      A/P: Pt is a 74 y.o. female who presents for cellulitis. It appears to be resolving and she is on appropriate therapy to cover MRSA  - Continue current abx  - RTC if symptoms worsen or fail to improve      Discussed diagnoses in detail with patient. Medication risks/benefits/side effects discussed with patient. All of the patient's questions were addressed. The patient understands and agrees with our plan of care. The patient knows to call back if they are unsure of or forget any changes we discussed today or if the symptoms change. The patient received an After-Visit Summary which contains VS, orders, medication list and allergy list. This can be used as a \"mini-medical record\" should they have to seek medical care while out of town. Current Outpatient Prescriptions on File Prior to Visit   Medication Sig Dispense Refill    omeprazole (PRILOSEC) 20 mg capsule TAKE ONE CAPSULE BY MOUTH EVERY DAY 30 Cap 11    atorvastatin (LIPITOR) 40 mg tablet Take 1 Tab by mouth daily. STOP NIACIN. 30 Tab 5    omega-3 fatty acids-vitamin e (FISH OIL) 1,000 mg cap Take 1 Cap by mouth two (2) times a day.  triamcinolone acetonide (KENALOG) 0.1 % topical cream Apply to arms bid when itching. 454 g 0    aspirin delayed-release 81 mg tablet Take 81 mg by mouth daily.  CALCIUM CARBONATE (ISAI-600 PO) Take  by mouth.  Cholecalciferol, Vitamin D3, (VITAMIN D) 2,000 unit Cap Take  by mouth.  VITAMIN B COMPLEX (VITAMIN B-100 COMPLEX PO) Take  by mouth.  niacin ER (NIASPAN) 500 mg tablet Take 1 Tab by mouth daily.  Indications: HYPERCHOLESTEROLEMIA 90 Tab 3    albuterol (PROVENTIL HFA, VENTOLIN HFA, PROAIR HFA) 90 mcg/actuation inhaler Take 2 Puffs by inhalation every four (4) hours as needed for Wheezing. Indications: ACUTE ASTHMA ATTACK 1 Inhaler 5    mometasone (NASONEX) 50 mcg/actuation nasal spray 2 Sprays by Both Nostrils route daily. 1 Container 6    albuterol sulfate (PROVENTIL;VENTOLIN) 2.5 mg/0.5 mL nebu nebulizer solution 0.5 mL by Nebulization route every four (4) hours as needed for Wheezing. Indications: ACUTE ASTHMA ATTACK, BRONCHOSPASM PREVENTION 20 mL 0    fluticasone (FLOVENT HFA) 110 mcg/actuation inhaler Take 1 Puff by inhalation every twelve (12) hours. Indications: BRONCHIAL ASTHMA 1 Inhaler 5    traMADol (ULTRAM) 50 mg tablet Take 1 tablet by mouth every eight (8) hours as needed for Pain. 30 tablet 3     No current facility-administered medications on file prior to visit.

## 2017-09-11 NOTE — MR AVS SNAPSHOT
Visit Information Date & Time Provider Department Dept. Phone Encounter #  
 9/11/2017  9:30 AM Susu Hopson MD [address] [place] [address] [id number] Follow-up Instructions Return if symptoms worsen or fail to improve. Your Appointments 10/25/2017  9:35 AM  
ROUTINE CARE with Amy Vasquez MD  
[address]) Appt Note: 6 month f/u  
 [address] [address]  
[facility] [address] [address] [address] Upcoming Health Maintenance Date Due INFLUENZA AGE 9 TO ADULT 8/1/2017 MEDICARE YEARLY EXAM 2/2/2018 GLAUCOMA SCREENING Q2Y 7/6/2018 BREAST CANCER SCRN MAMMOGRAM 10/30/2018 Bone Densitometry 10/31/2019 DTaP/Tdap/Td series (2 - Td) 7/13/2021 COLONOSCOPY 3/17/2027 Allergies as of 9/11/2017  Review Complete On: 9/11/2017 By: Gabriel Arteaga LPN No Known Allergies Current Immunizations  Reviewed on 6/6/2016 Name Date H1N1 FLU VACCINE 11/25/2009 Influenza Vaccine 10/13/2015, 10/3/2013 Influenza Vaccine (Quad) PF 12/13/2016, 11/20/2014 Influenza Vaccine Split 10/8/2012, 12/15/2011 Influenza Vaccine Whole 11/3/2010, 10/8/2009 Pneumococcal Conjugate (PCV-13) 11/10/2015 Pneumococcal Vaccine (Pcv) 10/1/2006 TDAP Vaccine 7/13/2011 ZZZ-RETIRED (DO NOT USE) Pneumococcal Vaccine (Unspecified Type) 2/21/2011 Zoster Vaccine, Live 1/21/2013 Not reviewed this visit You Were Diagnosed With   
  
 Codes Comments Cellulitis and abscess of other specified site    -  Primary ICD-10-CM: L03.818, L02.818 ICD-9-CM: 682.8 Vitals BP Pulse Temp Resp Height(growth percentile) Weight(growth percentile) 110/73 (BP 1 Location: Right arm, BP Patient Position: Sitting) 69 97.3 °F (36.3 °C) (Oral) 16 5' 6.5\" (1.689 m) 197 lb (89.4 kg) SpO2 BMI OB Status Smoking Status  94% 31.32 kg/m2 Hysterectomy Never Smoker Vitals History BMI and BSA Data Body Mass Index Body Surface Area  
 31.32 kg/m 2 2.05 m 2 Preferred Pharmacy Pharmacy Name Phone [address] [place], [place] DUBOSE  [phone] Your Updated Medication List  
  
   
This list is accurate as of: 9/11/17 10:22 AM.  Always use your most recent med list.  
  
  
  
  
 * albuterol 90 mcg/actuation inhaler Commonly known as:  PROVENTIL HFA, VENTOLIN HFA, PROAIR HFA Take 2 Puffs by inhalation every four (4) hours as needed for Wheezing. Indications: ACUTE ASTHMA ATTACK * albuterol sulfate 2.5 mg/0.5 mL Nebu nebulizer solution Commonly known as:  PROVENTIL;VENTOLIN  
0.5 mL by Nebulization route every four (4) hours as needed for Wheezing. Indications: ACUTE ASTHMA ATTACK, BRONCHOSPASM PREVENTION  
  
 aspirin delayed-release 81 mg tablet Take 81 mg by mouth daily. atorvastatin 40 mg tablet Commonly known as:  LIPITOR Take 1 Tab by mouth daily. STOP NIACIN. AUGMENTIN 500-125 mg per tablet Generic drug:  amoxicillin-clavulanate Take 1 Tab by mouth two (2) times a day. ISAI-600 PO Take  by mouth. doxycycline 100 mg capsule Commonly known as:  VIBRAMYCIN  
  
 FISH OIL 1,000 mg Cap Generic drug:  omega-3 fatty acids-vitamin e Take 1 Cap by mouth two (2) times a day. omeprazole 20 mg capsule Commonly known as:  PRILOSEC  
TAKE ONE CAPSULE BY MOUTH EVERY DAY  
  
 triamcinolone acetonide 0.1 % topical cream  
Commonly known as:  KENALOG Apply to arms bid when itching. VITAMIN B-100 COMPLEX PO Take  by mouth. VITAMIN D 2,000 unit Cap capsule Generic drug:  Cholecalciferol (Vitamin D3) Take  by mouth. * Notice: This list has 2 medication(s) that are the same as other medications prescribed for you.  Read the directions carefully, and ask your doctor or other care provider to review them with you.  
  
  
  
Follow-up Instructions Return if symptoms worsen or fail to improve. Patient Instructions Cellulitis: Care Instructions Your Care Instructions Cellulitis is a skin infection. It often occurs after a break in the skin from a scrape, cut, bite, or puncture, or after a rash. The doctor has checked you carefully, but problems can develop later. If you notice any problems or new symptoms, get medical treatment right away. Follow-up care is a key part of your treatment and safety. Be sure to make and go to all appointments, and call your doctor if you are having problems. It's also a good idea to know your test results and keep a list of the medicines you take. How can you care for yourself at home? · Take your antibiotics as directed. Do not stop taking them just because you feel better. You need to take the full course of antibiotics. · Prop up the infected area on pillows to reduce pain and swelling. Try to keep the area above the level of your heart as often as you can. · If your doctor told you how to care for your wound, follow your doctor's instructions. If you did not get instructions, follow this general advice: ¨ Wash the wound with clean water 2 times a day. Don't use hydrogen peroxide or alcohol, which can slow healing. ¨ You may cover the wound with a thin layer of petroleum jelly, such as Vaseline, and a nonstick bandage. ¨ Apply more petroleum jelly and replace the bandage as needed. · Be safe with medicines. Take pain medicines exactly as directed. ¨ If the doctor gave you a prescription medicine for pain, take it as prescribed. ¨ If you are not taking a prescription pain medicine, ask your doctor if you can take an over-the-counter medicine. To prevent cellulitis in the future · Try to prevent cuts, scrapes, or other injuries to your skin. Cellulitis most often occurs where there is a break in the skin.  
· If you get a scrape, cut, mild burn, or bite, wash the wound with clean water as soon as you can to help avoid infection. Don't use hydrogen peroxide or alcohol, which can slow healing. · If you have swelling in your legs (edema), support stockings and good skin care may help prevent leg sores and cellulitis. · Take care of your feet, especially if you have diabetes or other conditions that increase the risk of infection. Wear shoes and socks. Do not go barefoot. If you have athlete's foot or other skin problems on your feet, talk to your doctor about how to treat them. When should you call for help? Call your doctor now or seek immediate medical care if: 
· You have signs that your infection is getting worse, such as: 
¨ Increased pain, swelling, warmth, or redness. ¨ Red streaks leading from the area. ¨ Pus draining from the area. ¨ A fever. · You get a rash. Watch closely for changes in your health, and be sure to contact your doctor if: 
· You are not getting better after 1 day (24 hours). · You do not get better as expected. Where can you learn more? Go to http://chuck-hank.info/. Eloise Howell in the search box to learn more about \"Cellulitis: Care Instructions. \" Current as of: October 13, 2016 Content Version: 11.3 © 8900-7602 Form[employer]. Care instructions adapted under license by [employer] (which disclaims liability or warranty for this information). If you have questions about a medical condition or this instruction, always ask your healthcare professional. [place] [address] any warranty or liability for your use of this information. Introducing [address]! Dear Brooks Corona: 
Thank you for requesting a [employer] account. Our records indicate that you have previously registered for a [employer] account but its currently inactive. Please call our [employer] support line at [phone]. Additional Information If you have questions, please visit the Frequently Asked Questions section of the Zumper website at [email or web address] Remember, Zumper is NOT to be used for urgent needs. For medical emergencies, dial 911. Now available from your iPhone and Android! Please provide this summary of care documentation to your next provider. Your primary care clinician is listed as Π[address] 90. If you have any questions after today's visit, please call [phone].

## 2017-10-25 ENCOUNTER — OFFICE VISIT (OUTPATIENT)
Dept: FAMILY MEDICINE CLINIC | Age: 72
End: 2017-10-25

## 2017-10-25 VITALS
OXYGEN SATURATION: 95 % | HEIGHT: 67 IN | BODY MASS INDEX: 30.45 KG/M2 | HEART RATE: 61 BPM | DIASTOLIC BLOOD PRESSURE: 53 MMHG | WEIGHT: 194 LBS | RESPIRATION RATE: 16 BRPM | TEMPERATURE: 97.9 F | SYSTOLIC BLOOD PRESSURE: 116 MMHG

## 2017-10-25 DIAGNOSIS — E55.9 VITAMIN D DEFICIENCY: ICD-10-CM

## 2017-10-25 DIAGNOSIS — I63.9 CEREBROVASCULAR ACCIDENT (CVA), UNSPECIFIED MECHANISM (HCC): ICD-10-CM

## 2017-10-25 DIAGNOSIS — J45.20 MILD INTERMITTENT ASTHMA WITHOUT COMPLICATION: Chronic | ICD-10-CM

## 2017-10-25 DIAGNOSIS — L02.91 ABSCESS: ICD-10-CM

## 2017-10-25 DIAGNOSIS — E78.00 HYPERCHOLESTEREMIA: Primary | Chronic | ICD-10-CM

## 2017-10-25 PROBLEM — H25.12 CATARACT, NUCLEAR SCLEROTIC, LEFT EYE: Status: ACTIVE | Noted: 2017-07-10

## 2017-10-25 PROBLEM — H02.834 DERMATOCHALASIS OF LEFT UPPER EYELID: Status: ACTIVE | Noted: 2017-07-10

## 2017-10-25 PROBLEM — Z96.1 PSEUDOPHAKIA OF RIGHT EYE: Status: ACTIVE | Noted: 2017-07-10

## 2017-10-25 PROBLEM — H02.831 DERMATOCHALASIS OF RIGHT UPPER EYELID: Status: ACTIVE | Noted: 2017-07-10

## 2017-10-25 PROBLEM — H26.491 PCO (POSTERIOR CAPSULAR OPACIFICATION), RIGHT: Status: ACTIVE | Noted: 2017-07-10

## 2017-10-25 NOTE — PATIENT INSTRUCTIONS

## 2017-10-25 NOTE — PROGRESS NOTES
Progress Note    Patient: Carlos Palomo MRN: [id number]  SSN: [id number]    YOB: 1945  Age: 74 y.o. Sex: female        Chief Complaint   Patient presents with    Cholesterol Problem         Subjective:     Encounter Diagnoses   Name Primary?  Hypercholesteremia:  Cardiovascular risks for her are: LDL goal is under 80. Currently she takes Lipitor (atorvastatin) , 40 mg  Lab Results   Component Value Date/Time    Cholesterol, total 129 08/18/2017 12:38 PM    HDL Cholesterol 51 08/18/2017 12:38 PM    LDL, calculated 64 08/18/2017 12:38 PM    Triglyceride 70 08/18/2017 12:38 PM    CHOL/HDL Ratio 2.6 09/21/2010 08:03 AM     Lab Results   Component Value Date/Time    ALT (SGPT) 26 08/18/2017 12:38 PM    AST (SGOT) 24 08/18/2017 12:38 PM    Alk. phosphatase 79 08/18/2017 12:38 PM    Bilirubin, direct 0.24 08/18/2017 12:38 PM    Bilirubin, total 0.9 08/18/2017 12:38 PM      Myalgias: No   Fatigue: No   Other side effects: no  Wt Readings from Last 3 Encounters:   10/25/17 194 lb (88 kg)   09/11/17 197 lb (89.4 kg)   04/25/17 194 lb 9.6 oz (88.3 kg)     The patient is aware of our goal to reduce or eliminate the long term problems (such as strokes and heart attacks) related to poorly controlled hyperlipidemia. Yes    Mild intermittent asthma without complication: no recent sx.  Vitamin D deficiency:  No sx. Due for testing. Lab Results   Component Value Date/Time    Vitamin D 25-Hydroxy 26 09/21/2010 08:03 AM    VITAMIN D, 25-HYDROXY 36.3 04/25/2017 03:59 PM            Cerebrovascular accident (CVA), unspecified mechanism ([facility] [address]): remote and no recent sx.  Abscess: left shoulder in September. [facility] and followed up here x 1 and finally resolved. Has a scar where it drained.               Current and past medical information:    Current Medications after this visit[de-identified]   Current Outpatient Prescriptions   Medication Sig    omeprazole (PRILOSEC) 20 mg capsule TAKE ONE CAPSULE BY MOUTH EVERY DAY    atorvastatin (LIPITOR) 40 mg tablet Take 1 Tab by mouth daily. STOP NIACIN.  omega-3 fatty acids-vitamin e (FISH OIL) 1,000 mg cap Take 1 Cap by mouth two (2) times a day.  triamcinolone acetonide (KENALOG) 0.1 % topical cream Apply to arms bid when itching.  aspirin delayed-release 81 mg tablet Take 81 mg by mouth daily.  CALCIUM CARBONATE (ISAI-600 PO) Take  by mouth.  Cholecalciferol, Vitamin D3, (VITAMIN D) 2,000 unit Cap Take  by mouth.  VITAMIN B COMPLEX (VITAMIN B-100 COMPLEX PO) Take  by mouth.  albuterol (PROVENTIL HFA, VENTOLIN HFA, PROAIR HFA) 90 mcg/actuation inhaler Take 2 Puffs by inhalation every four (4) hours as needed for Wheezing. Indications: ACUTE ASTHMA ATTACK    albuterol sulfate (PROVENTIL;VENTOLIN) 2.5 mg/0.5 mL nebu nebulizer solution 0.5 mL by Nebulization route every four (4) hours as needed for Wheezing. Indications: ACUTE ASTHMA ATTACK, BRONCHOSPASM PREVENTION     No current facility-administered medications for this visit.         Patient Active Problem List    Diagnosis Date Noted    Cataract, nuclear sclerotic, left eye 07/10/2017    Dermatochalasis of left upper eyelid 07/10/2017    Dermatochalasis of right upper eyelid 07/10/2017    PCO (posterior capsular opacification), right 07/10/2017    Pseudophakia of right eye 07/10/2017    Arthritis pain 12/09/2014    Vitamin D deficiency 01/07/2014    Bronchitis, mucopurulent recurrent ([address] 05/14/2010    Hypercholesteremia 04/05/2010    CVA (cerebral vascular accident) ([address] 04/05/2010    Asthma 04/05/2010    Asthmatic bronchitis 04/05/2010       Past Medical History:   Diagnosis Date    Asthma 4/5/2010    Bronchitis, mucopurulent recurrent ([address] 5/14/2010    CVA (cerebral vascular accident) ([address] 4/5/2010    Hypercholesteremia 4/5/2010       No Known Allergies    Past Surgical History:   Procedure Laterality Date    HX HYSTERECTOMY      HX TONSILLECTOMY Social History     Social History    Marital status:      Spouse name: N/A    Number of children: N/A    Years of education: N/A     Social History Main Topics    Smoking status: Never Smoker    Smokeless tobacco: Never Used    Alcohol use No    Drug use: No    Sexual activity: Not Asked     Other Topics Concern    None     Social History Narrative       Review of Systems   Constitutional: Negative. Negative for chills, fever, malaise/fatigue and weight loss. HENT: Negative. Negative for hearing loss. Eyes: Negative. Negative for blurred vision and double vision. Respiratory: Negative. Negative for cough, hemoptysis, sputum production and shortness of breath. Cardiovascular: Negative. Negative for chest pain, palpitations and orthopnea. Gastrointestinal: Negative. Negative for abdominal pain, blood in stool, heartburn, nausea and vomiting. Genitourinary: Negative. Negative for dysuria, frequency and urgency. Musculoskeletal: Negative. Negative for back pain, myalgias and neck pain. Skin: Negative. Negative for rash. Scarred lesion on left shoulder-she said abscess was 2-3 inches in widest area. Neurological: Negative for dizziness, tingling, tremors, sensory change, speech change, weakness and headaches. Endo/Heme/Allergies: Negative. Psychiatric/Behavioral: Negative. Negative for depression. Objective:     Vitals:    10/25/17 0933   BP: 116/53   Pulse: 61   Resp: 16   Temp: 97.9 °F (36.6 °C)   TempSrc: Oral   SpO2: 95%   Weight: 194 lb (88 kg)   Height: 5' 6.5\" (1.689 m)      Body mass index is 30.84 kg/(m^2). Physical Exam   Constitutional: She is oriented to person, place, and time and well-developed, well-nourished, and in no distress. No distress. HENT:   Head: Normocephalic and atraumatic. Mouth/Throat: Oropharynx is clear and moist.   Eyes: Conjunctivae are normal.   Neck: No tracheal deviation present. No thyromegaly present. Cardiovascular: Normal rate, regular rhythm and normal heart sounds. No murmur heard. Pulmonary/Chest: Effort normal and breath sounds normal. No respiratory distress. Abdominal: Soft. She exhibits no distension. Lymphadenopathy:     She has no cervical adenopathy. Neurological: She is alert and oriented to person, place, and time. Skin: Skin is warm. No rash noted. She is not diaphoretic. No erythema. Scar left shoulder     Psychiatric: Mood and affect normal.   Nursing note and vitals reviewed. There are no preventive care reminders to display for this patient. Assessment and orders:       ICD-10-CM ICD-9-CM    1. Hypercholesteremia E78.00 272.0 LIPID PANEL      METABOLIC PANEL, COMPREHENSIVE   2. Mild intermittent asthma without complication- no recent  Symptoms   J45.20 493.90    3. Vitamin D deficiency- recheck   E55.9 268.9 VITAMIN D, 25 HYDROXY   4. Cerebrovascular accident (CVA), unspecified mechanism ([facility] [address]- no recurrence on ASA and lipitor. I63.9 434.91    5. Abscess- see above, appears healed. L02.91 682.9 CBC W/O DIFF         Plan of care:  Discussed diagnoses in detail with patient. Medication risks/benefits/side effects discussed with patient. All of the patient's questions were addressed. The patient understands and agrees with our plan of care. The patient knows to call back if they are unsure of or forget any changes we discussed today or if the symptoms change. The patient received an After-Visit Summary which contains VS, orders, medication list and allergy list. This can be used as a \"mini-medical record\" should they have to seek medical care while out of town. Patient Care Team:  Genet Us MD as PCP - General    Follow-up Disposition:  Return in about 6 months (around 4/25/2018).     Future Appointments  Date Time Provider Jero Amaris   2/5/2018 1:25 PM Genet Us MD [facility]   4/25/2018 9:35 AM Genet Us MD [facility] MICHELET SCHED       Signed By: Meghna Caballero MD     October 25, 2017

## 2017-10-25 NOTE — MR AVS SNAPSHOT
Visit Information Date & Time Provider Department Dept. Phone Encounter #  
 10/25/2017  9:35 AM Emili Christine MD [address] [id number] Follow-up Instructions Return in about 6 months (around 4/25/2018). Your Appointments 2/5/2018  1:25 PM  
Medicare Physical with Emili Christine MD  
704 [facility]) Appt Note: -letter mailed [address] [address]  
[place] [address] [address] Upcoming Health Maintenance Date Due  
 MEDICARE YEARLY EXAM 2/2/2018 GLAUCOMA SCREENING Q2Y 7/6/2018 Bone Densitometry 10/31/2019 DTaP/Tdap/Td series (2 - Td) 7/13/2021 COLONOSCOPY 3/17/2027 Allergies as of 10/25/2017  Review Complete On: 9/11/2017 By: Carlos San LPN No Known Allergies Current Immunizations  Reviewed on 6/6/2016 Name Date H1N1 FLU VACCINE 11/25/2009 Influenza High Dose Vaccine PF 9/11/2017 Influenza Vaccine 10/13/2015, 10/3/2013 Influenza Vaccine (Quad) PF 12/13/2016, 11/20/2014 Influenza Vaccine Split 10/8/2012, 12/15/2011 Influenza Vaccine Whole 11/3/2010, 10/8/2009 Pneumococcal Conjugate (PCV-13) 11/10/2015 Pneumococcal Vaccine (Pcv) 10/1/2006 TDAP Vaccine 7/13/2011 ZZZ-RETIRED (DO NOT USE) Pneumococcal Vaccine (Unspecified Type) 2/21/2011 Zoster Vaccine, Live 1/21/2013 Not reviewed this visit You Were Diagnosed With   
  
 Codes Comments Hypercholesteremia    -  Primary ICD-10-CM: E78.00 ICD-9-CM: 272.0 Mild intermittent asthma without complication     [facility]: J45.20 ICD-9-CM: 493.90 Vitamin D deficiency     ICD-10-CM: E55.9 ICD-9-CM: 268.9 C[facility]brovascular accident (CV[facility] unspecified [facility]     ICD-10-CM: I63.9 ICD-9-CM: 434.91 Abscess     ICD-10-CM: L02.91 
ICD-9-CM: 758. 9 Vitals  BP Pulse Temp Resp Height(growth percentile) Weight(growth percentile) 116/53 (BP 1 Location: Left arm, BP Patient Position: Sitting) 61 97.9 °F (36.6 °C) (Oral) 16 5' 6.5\" (1.689 m) 194 lb (88 kg) SpO2 BMI OB Status Smoking Status 95% 30.84 kg/m2 Hysterectomy Never Smoker Vitals History BMI and BSA Data Body Mass Index Body Surface Area  
 30.84 kg/m 2 2.03 m 2 Preferred Pharmacy Pharmacy Name Phone 900 [facility] [place] [address] [phone] Your Updated Medication List  
  
   
This list is accurate as of: 10/25/17  9:57 AM.  Always use your most recent med list.  
  
  
  
  
 * albuterol 90 mcg/actuation inhaler Commonly known as:  PROVENTIL HFA, VENTOLIN HFA, PROAIR HFA Take 2 Puffs by inhalation every four (4) hours as needed for Wheezing. Indications: ACUTE ASTHMA ATTACK * albuterol sulfate 2.5 mg/0.5 mL Nebu nebulizer solution Commonly known as:  PROVENTIL;VENTOLIN  
0.5 mL by Nebulization route every four (4) hours as needed for Wheezing. Indications: ACUTE ASTHMA ATTACK, BRONCHOSPASM PREVENTION  
  
 aspirin delayed-release 81 mg tablet Take 81 mg by mouth daily. atorvastatin 40 mg tablet Commonly known as:  LIPITOR Take 1 Tab by mouth daily. STOP NIACIN. ISAI-600 PO Take  by mouth. FISH OIL 1,000 mg Cap Generic drug:  omega-3 fatty acids-vitamin e Take 1 Cap by mouth two (2) times a day. omeprazole 20 mg capsule Commonly known as:  PRILOSEC  
TAKE ONE CAPSULE BY MOUTH EVERY DAY  
  
 triamcinolone acetonide 0.1 % topical cream  
Commonly known as:  KENALOG Apply to arms bid when itching. VITAMIN B-100 COMPLEX PO Take  by mouth. VITAMIN D 2,000 unit Cap capsule Generic drug:  Cholecalciferol (Vitamin D3) Take  by mouth. * Notice: This list has 2 medication(s) that are the same as other medications prescribed for you.  Read the directions carefully, and ask your doctor or other care provider to review them with you. We Performed the Following CBC W/O DIFF [56723 CPT(R)] LIPID PANEL [22689 CPT(R)] METABOLIC PANEL, COMPREHENSIVE [72187 CPT(R)] VITAMIN D, 25 HYDROXY [id number] CPT(R)] Follow-up Instructions Return in about 6 months (around 4/25/2018). Patient Instructions High Cholesterol: Care Instructions Your Care Instructions Cholesterol is a type of fat in your blood. It is needed for many body functions, such as making new cells. Cholesterol is made by your body. It also comes from food you eat. High cholesterol means that you have too much of the fat in your blood. This raises your risk of a heart attack and stroke. LDL and HDL are part of your total cholesterol. LDL is the \"bad\" cholesterol. High LDL can raise your risk for heart disease, heart attack, and stroke. HDL is the \"good\" cholesterol. It helps clear bad cholesterol from the body. High HDL is linked with a lower risk of heart disease, heart attack, and stroke. Your cholesterol levels help your doctor find out your risk for having a heart attack or stroke. You and your doctor can talk about whether you need to lower your risk and what treatment is best for you. A heart-healthy lifestyle along with medicines can help lower your cholesterol and your risk. The way you choose to lower your risk will depend on how high your risk is for heart attack and stroke. It will also depend on how you feel about taking medicines. Follow-up care is a key part of your treatment and safety. Be sure to make and go to all appointments, and call your doctor if you are having problems. It's also a good idea to know your test results and keep a list of the medicines you take. How can you care for yourself at home? · Eat a variety of foods every day.  Good choices include fruits, vegetables, whole grains (like oatmeal), dried beans and peas, nuts and seeds, soy products (like tofu), and fat-free or low-fat dairy products. · Replace butter, margarine, and hydrogenated or partially hydrogenated oils with olive and canola oils. (Canola oil margarine without trans fat is fine.) · Replace red meat with fish, poultry, and soy protein (like tofu). · Limit processed and packaged foods like chips, crackers, and cookies. · Bake, broil, or steam foods. Don't good them. · Be physically active. Get at least 30 minutes of exercise on most days of the week. Walking is a good choice. You also may want to do other activities, such as running, swimming, cycling, or playing tennis or team sports. · Stay at a healthy weight or lose weight by making the changes in eating and physical activity listed above. Losing just a small amount of weight, even 5 to 10 pounds, can reduce your risk for having a heart attack or stroke. · Do not smoke. When should you call for help? Watch closely for changes in your health, and be sure to contact your doctor if: 
· You need help making lifestyle changes. · You have questions about your medicine. Where can you learn more? Go to http://chuckVerivo Softwarehank.info/. Enter U533 in the search box to learn more about \"High Cholesterol: Care Instructions. \" Current as of: April 3, 2017 Content Version: 11.3 © 4958-9750 Pinewood Social. Care instructions adapted under license by [employer] (which disclaims liability or warranty for this information). If you have questions about a medical condition or this instruction, always ask your healthcare professional. [place] [address] any warranty or liability for your use of this information. Introducing [facility]! Dear Delmer Felton: 
Thank you for requesting a [employer] account. Our records indicate that you have previously registered for a [employer] account but its currently inactive. Please call our [employer] support line at [phone]. Additional Information If you have questions, please visit the Frequently Asked Questions section of the [email or web address] website at [email or web address] Remember, Individual Digital is NOT to be used for urgent needs. For medical emergencies, dial 911. Now available from your iPhone and Android! Please provide this summary of care documentation to your next provider. Your primary care clinician is listed as Π[address] 90. If you have any questions after today's visit, please call [phone].

## 2017-10-26 LAB
25(OH)D3+25(OH)D2 SERPL-MCNC: 36.6 NG/ML (ref 30–100)
ALBUMIN SERPL-MCNC: 4.2 G/DL (ref 3.5–4.8)
ALBUMIN/GLOB SERPL: 1.7 {RATIO} (ref 1.2–2.2)
ALP SERPL-CCNC: 82 IU/L (ref 39–117)
ALT SERPL-CCNC: 21 IU/L (ref 0–32)
AST SERPL-CCNC: 27 IU/L (ref 0–40)
BILIRUB SERPL-MCNC: 0.9 MG/DL (ref 0–1.2)
BUN SERPL-MCNC: 15 MG/DL (ref 8–27)
BUN/CREAT SERPL: 18 (ref 12–28)
CALCIUM SERPL-MCNC: 9.2 MG/DL (ref 8.7–10.3)
CHLORIDE SERPL-SCNC: 100 MMOL/L (ref 96–106)
CHOLEST SERPL-MCNC: 147 MG/DL (ref 100–199)
CO2 SERPL-SCNC: 25 MMOL/L (ref 18–29)
CREAT SERPL-MCNC: 0.82 MG/DL (ref 0.57–1)
ERYTHROCYTE [DISTWIDTH] IN BLOOD BY AUTOMATED COUNT: 14.1 % (ref 12.3–15.4)
GFR SERPLBLD CREATININE-BSD FMLA CKD-EPI: 71 ML/MIN/1.73
GFR SERPLBLD CREATININE-BSD FMLA CKD-EPI: 82 ML/MIN/1.73
GLOBULIN SER CALC-MCNC: 2.5 G/DL (ref 1.5–4.5)
GLUCOSE SERPL-MCNC: 95 MG/DL (ref 65–99)
HCT VFR BLD AUTO: 37.8 % (ref 34–46.6)
HDLC SERPL-MCNC: 51 MG/DL
HGB BLD-MCNC: 12.6 G/DL (ref 11.1–15.9)
LDLC SERPL CALC-MCNC: 78 MG/DL (ref 0–99)
MCH RBC QN AUTO: 30.3 PG (ref 26.6–33)
MCHC RBC AUTO-ENTMCNC: 33.3 G/DL (ref 31.5–35.7)
MCV RBC AUTO: 91 FL (ref 79–97)
PLATELET # BLD AUTO: 248 X10E3/UL (ref 150–379)
POTASSIUM SERPL-SCNC: 4.6 MMOL/L (ref 3.5–5.2)
PROT SERPL-MCNC: 6.7 G/DL (ref 6–8.5)
RBC # BLD AUTO: 4.16 X10E6/UL (ref 3.77–5.28)
SODIUM SERPL-SCNC: 140 MMOL/L (ref 134–144)
TRIGL SERPL-MCNC: 88 MG/DL (ref 0–149)
VLDLC SERPL CALC-MCNC: 18 MG/DL (ref 5–40)
WBC # BLD AUTO: 5.1 X10E3/UL (ref 3.4–10.8)

## 2017-12-07 DIAGNOSIS — E78.00 HYPERCHOLESTEREMIA: Chronic | ICD-10-CM

## 2017-12-07 RX ORDER — ATORVASTATIN CALCIUM 40 MG/1
40 TABLET, FILM COATED ORAL DAILY
Qty: 30 TAB | Refills: 5 | Status: SHIPPED | OUTPATIENT
Start: 2017-12-07 | End: 2017-12-11 | Stop reason: SDUPTHER

## 2017-12-11 DIAGNOSIS — E78.00 HYPERCHOLESTEREMIA: Chronic | ICD-10-CM

## 2017-12-11 RX ORDER — ATORVASTATIN CALCIUM 40 MG/1
TABLET, FILM COATED ORAL
Qty: 30 TAB | Refills: 6 | Status: SHIPPED | OUTPATIENT
Start: 2017-12-11 | End: 2018-04-04 | Stop reason: SDUPTHER

## 2017-12-11 RX ORDER — ATORVASTATIN CALCIUM 40 MG/1
TABLET, FILM COATED ORAL
Qty: 30 TAB | Refills: 0 | Status: SHIPPED | OUTPATIENT
Start: 2017-12-11 | End: 2017-12-11 | Stop reason: SDUPTHER

## 2018-02-05 ENCOUNTER — OFFICE VISIT (OUTPATIENT)
Dept: FAMILY MEDICINE CLINIC | Age: 73
End: 2018-02-05

## 2018-02-05 VITALS
HEART RATE: 67 BPM | TEMPERATURE: 97.7 F | RESPIRATION RATE: 18 BRPM | DIASTOLIC BLOOD PRESSURE: 57 MMHG | OXYGEN SATURATION: 95 % | WEIGHT: 196.8 LBS | SYSTOLIC BLOOD PRESSURE: 112 MMHG | BODY MASS INDEX: 30.89 KG/M2 | HEIGHT: 67 IN

## 2018-02-05 DIAGNOSIS — E78.00 HYPERCHOLESTEREMIA: Chronic | ICD-10-CM

## 2018-02-05 DIAGNOSIS — Z00.00 MEDICARE ANNUAL WELLNESS VISIT, SUBSEQUENT: Primary | ICD-10-CM

## 2018-02-05 DIAGNOSIS — J45.20 MILD INTERMITTENT ASTHMA WITHOUT COMPLICATION: Chronic | ICD-10-CM

## 2018-02-05 NOTE — PATIENT INSTRUCTIONS

## 2018-02-05 NOTE — MR AVS SNAPSHOT
[address] [facility], [place], operated by [facility] 
 
 
 [address] [address] 
[phone] Patient: Onel Raza MRN: [id number] RFI:7/27/2777 Visit Information Date & Time Provider Department Dept. Phone Encounter #  
 2/5/2018  1:25 PM Kevin Elaine MD [address] Aileen Umaña [id number] Your Appointments 4/25/2018  9:35 AM  
ROUTINE CARE with Kevin Elaine MD  
[address] [place] [address] Appt Note: 6 mo f/u-Hypercholesteremia [address] [address]  
[place] [address] [address] Upcoming Health Maintenance Date Due  
 MEDICARE YEARLY EXAM 2/2/2018 GLAUCOMA SCREENING Q2Y 7/6/2018 BREAST CANCER SCRN MAMMOGRAM 10/30/2018 Bone Densitometry 10/31/2019 DTaP/Tdap/Td series (2 - Td) 7/13/2021 COLONOSCOPY 3/17/2027 Allergies as of 2/5/2018  Review Complete On: 2/5/2018 By: Kevin Elaine MD  
 No Known Allergies Current Immunizations  Reviewed on 6/6/2016 Name Date H1N1 FLU VACCINE 11/25/2009 Influenza High Dose Vaccine PF 9/11/2017 Influenza Vaccine 10/13/2015, 10/3/2013 Influenza Vaccine (Quad) PF 12/13/2016, 11/20/2014 Influenza Vaccine Split 10/8/2012, 12/15/2011 Influenza Vaccine Whole 11/3/2010, 10/8/2009 Pneumococcal Conjugate (PCV-13) 11/10/2015 Pneumococcal Vaccine (Pcv) 10/1/2006 TDAP Vaccine 7/13/2011 ZZZ-RETIRED (DO NOT USE) Pneumococcal Vaccine (Unspecified Type) 2/21/2011 Zoster Vaccine, Live 1/21/2013 Not reviewed this visit You Were Diagnosed With   
  
 Codes Comments Medicare annual wellness visit, subsequent    -  Primary ICD-10-CM: Z00.00 ICD-9-CM: V70.0 Hypercholesteremia     ICD-10-CM: E78.00 ICD-9-CM: 272.0 Mild intermittent asthma without complication     X-91-RS: J45.20 ICD-9-CM: 493.90 Vitals  BP Pulse Temp Resp Height(growth percentile) Weight(growth percentile) 112/57 (BP 1 Location: Left arm, BP Patient Position: Sitting) 67 97.7 °F (36.5 °C) (Oral) 18 5' 6.5\" (1.689 m) 196 lb 12.8 oz (89.3 kg) SpO2 BMI OB Status Smoking Status 95% 31.29 kg/m2 Hysterectomy Never Smoker Vitals History BMI and BSA Data Body Mass Index Body Surface Area  
 31.29 kg/m 2 2.05 m 2 Preferred Pharmacy Pharmacy Name Phone [facility] [place] [address] [facility] [phone] Your Updated Medication List  
  
   
This list is accurate as of: 2/5/18  1:59 PM.  Always use your most recent med list.  
  
  
  
  
 * albuterol 90 mcg/actuation inhaler Commonly known as:  PROVENTIL HFA, VENTOLIN HFA, PROAIR HFA Take 2 Puffs by inhalation every four (4) hours as needed for Wheezing. Indications: ACUTE ASTHMA ATTACK * albuterol sulfate 2.5 mg/0.5 mL Nebu nebulizer solution Commonly known as:  PROVENTIL;VENTOLIN  
0.5 mL by Nebulization route every four (4) hours as needed for Wheezing. Indications: ACUTE ASTHMA ATTACK, BRONCHOSPASM PREVENTION  
  
 aspirin delayed-release 81 mg tablet Take 81 mg by mouth daily. atorvastatin 40 mg tablet Commonly known as:  LIPITOR  
TAKE ONE TABLET BY MOUTH EVERY DAY *STOP NIACIN* ISAI-600 PO Take  by mouth. FISH OIL 1,000 mg Cap Generic drug:  omega-3 fatty acids-vitamin e Take 1 Cap by mouth two (2) times a day. omeprazole 20 mg capsule Commonly known as:  PRILOSEC  
TAKE ONE CAPSULE BY MOUTH EVERY DAY  
  
 triamcinolone acetonide 0.1 % topical cream  
Commonly known as:  KENALOG Apply to arms bid when itching. VITAMIN B-100 COMPLEX PO Take  by mouth. VITAMIN D 2,000 unit Cap capsule Generic drug:  Cholecalciferol (Vitamin D3) Take  by mouth. * Notice: This list has 2 medication(s) that are the same as other medications prescribed for you.  Read the directions carefully, and ask your doctor or other care provider to review them with you. Patient Instructions Well Visit, Over 72: Care Instructions Your Care Instructions Physical exams can help you stay healthy. Your doctor has checked your overall health and may have suggested ways to take good care of yourself. He or she also may have recommended tests. At home, you can help prevent illness with healthy eating, regular exercise, and other steps. Follow-up care is a key part of your treatment and safety. Be sure to make and go to all appointments, and call your doctor if you are having problems. It's also a good idea to know your test results and keep a list of the medicines you take. How can you care for yourself at home? · Reach and stay at a healthy weight. This will lower your risk for many problems, such as obesity, diabetes, heart disease, and high blood pressure. · Get at least 30 minutes of exercise on most days of the week. Walking is a good choice. You also may want to do other activities, such as running, swimming, cycling, or playing tennis or team sports. · Do not smoke. Smoking can make health problems worse. If you need help quitting, talk to your doctor about stop-smoking programs and medicines. These can increase your chances of quitting for good. · Protect your skin from too much sun. When you're outdoors from 10 a.m. to 4 p.m., stay in the shade or cover up with clothing and a hat with a wide brim. Wear sunglasses that block UV rays. Even when it's cloudy, put broad-spectrum sunscreen (SPF 30 or higher) on any exposed skin. · See a dentist one or two times a year for checkups and to have your teeth cleaned. · Wear a seat belt in the car. · Limit alcohol to 2 drinks a day for men and 1 drink a day for women. Too much alcohol can cause health problems. Follow your doctor's advice about when to have certain tests. These tests can spot problems early. For men and women · Cholesterol. Your doctor will tell you how often to have this done based on your overall health and other things that can increase your risk for heart attack and stroke. · Blood pressure. Have your blood pressure checked during a routine doctor visit. Your doctor will tell you how often to check your blood pressure based on your age, your blood pressure results, and other factors. · Diabetes. Ask your doctor whether you should have tests for diabetes. · Vision. Experts recommend that you have yearly exams for glaucoma and other age-related eye problems. · Hearing. Tell your doctor if you notice any change in your hearing. You can have tests to find out how well you hear. · Colon cancer tests. Keep having colon cancer tests as your doctor recommends. You can have one of several types of tests. · Heart attack and stroke risk. At least every 4 to 6 years, you should have your risk for heart attack and stroke assessed. Your doctor uses factors such as your age, blood pressure, cholesterol, and whether you smoke or have diabetes to show what your risk for a heart attack or stroke is over the next 10 years. · Osteoporosis. Talk to your doctor about whether you should have a bone density test to find out whether you have thinning bones. Also ask your doctor about whether you should take calcium and vitamin D supplements. For women · Pap test and pelvic exam. You may no longer need a Pap test. Talk with your doctor about whether to stop or continue to have Pap tests. · Breast exam and mammogram. Ask how often you should have a mammogram, which is an X-ray of your breasts. A mammogram can spot breast cancer before it can be felt and when it is easiest to treat. · Thyroid disease. Talk to your doctor about whether to have your thyroid checked as part of a regular physical exam. Women have an increased chance of a thyroid problem. For men · Prostate exam. Talk to your doctor about whether you should have a blood test (called a PSA test) for prostate cancer. Experts disagree on whether men should have this test. Some experts recommend that you discuss the benefits and risks of the test with your doctor. · Abdominal aortic aneurysm. Ask your doctor whether you should have a test to check for an aneurysm. You may need a test if you ever smoked or if your parent, brother, sister, or child has had an aneurysm. When should you call for help? Watch closely for changes in your health, and be sure to contact your doctor if you have any problems or symptoms that concern you. Where can you learn more? Go to http://chuck-hank.info/. Enter C325 in the search box to learn more about \"Well Visit, Over 65: Care Instructions. \" Current as of: May 12, 2017 Content Version: 11.4 © 0171-9799 [employer]. Care instructions adapted under license by [employer] (which disclaims liability or warranty for this information). If you have questions about a medical condition or this instruction, always ask your healthcare professional. [place] [address] any warranty or liability for your use of this information. Introducing [facility]! Dear Ismael Wharton: 
Thank you for requesting a Instagarage account. Our records indicate that you have previously registered for a Instagar[employer] account but its currently inactive. Please call our Inst[employer] support line at [phone]. Additional Information If you have questions, please visit the Frequently Asked Questions section of the [employer] website at [email or web address] Remember, Instagarage is NOT to be used for urgent needs. For medical emergencies, dial 911. Now available from your iPhone and Android! Please provide this summary of care documentation to your next provider. Your primary care clinician is listed as [address]. If you have any questions after today's visit, please call [phone].

## 2018-02-05 NOTE — PROGRESS NOTES
Chief Complaint   Patient presents with    Annual Wellness Visit     Body mass index is 31.29 kg/(m^2). 1. Have you been to the ER, urgent care clinic since your last visit? Hospitalized since your last visit? No    2. Have you seen or consulted any other health care providers outside of the [address] [place] [address] since your last visit? Include any pap smears or colon screening.  No    Reviewed record in preparation for visit and have necessary documentation  Pt did not bring medication to office visit for review  Information was given to pt on Advanced Directives, Living Will  Information was given on Shingles Vaccine  Opportunity was given for questions  Goals that were addressed and/or need to be completed after this appointment include:     Health Maintenance Due   Topic Date Due    MEDICARE YEARLY EXAM  02/02/2018

## 2018-02-05 NOTE — PROGRESS NOTES
[address], [address] [place] [address]  [phone]           Date of visit: 2/5/2018     Roberto Lanes, MD    This is an Subsequent Medicare Annual Wellness Visit (AWV), (Performed more than 12 months after effective date of Medicare Part B enrollment and 12 months after last preventive visit, Once in a lifetime)    I have reviewed the patient's medical history in detail and updated the computerized patient record. Cindy Luke is a 74 y.o. female   History obtained from: the patient.     Concerns today   (Patient understands that medical problems addressed today may incur additional cost as this is a preventive visit)    History     Patient Active Problem List   Diagnosis Code    Hypercholesteremia E78.00    CVA (cerebral vascular accident) ([address] I63.9    Asthma J45.909    Asthmatic bronchitis J45.909    Bronchitis, mucopurulent recurrent ([address] J41.1    Vitamin D deficiency E55.9    Arthritis pain M19.90    Cataract, nuclear sclerotic, left eye H25.12    Dermatochalasis of left upper eyelid H02.834    Dermatochalasis of right upper eyelid H02.831    PCO (posterior capsular opacification), right H26.491    Pseudophakia of right eye Z96.1     Past Medical History:   Diagnosis Date    Asthma 4/5/2010    Bronchitis, mucopurulent recurrent ([address] 5/14/2010    CVA (cerebral vascular accident) ([address] 4/5/2010    Hypercholesteremia 4/5/2010      Past Surgical History:   Procedure Laterality Date    HX HYSTERECTOMY      HX TONSILLECTOMY       No Known Allergies  Current Outpatient Prescriptions   Medication Sig Dispense Refill    atorvastatin (LIPITOR) 40 mg tablet TAKE ONE TABLET BY MOUTH EVERY DAY *STOP NIACIN* 30 Tab 6    omeprazole (PRILOSEC) 20 mg capsule TAKE ONE CAPSULE BY MOUTH EVERY DAY 30 Cap 11    albuterol (PROVENTIL HFA, VENTOLIN HFA, PROAIR HFA) 90 mcg/actuation inhaler Take 2 Puffs by inhalation every four (4) hours as needed for Wheezing. Indications: ACUTE ASTHMA ATTACK 1 Inhaler 5    albuterol sulfate (PROVENTIL;VENTOLIN) 2.5 mg/0.5 mL nebu nebulizer solution 0.5 mL by Nebulization route every four (4) hours as needed for Wheezing. Indications: ACUTE ASTHMA ATTACK, BRONCHOSPASM PREVENTION 20 mL 0    omega-3 fatty acids-vitamin e (FISH OIL) 1,000 mg cap Take 1 Cap by mouth two (2) times a day.  aspirin delayed-release 81 mg tablet Take 81 mg by mouth daily.  CALCIUM CARBONATE (ISAI-600 PO) Take  by mouth.  Cholecalciferol, Vitamin D3, (VITAMIN D) 2,000 unit Cap Take  by mouth.  VITAMIN B COMPLEX (VITAMIN B-100 COMPLEX PO) Take  by mouth.  triamcinolone acetonide (KENALOG) 0.1 % topical cream Apply to arms bid when itching. 454 g 0     Family History   Problem Relation Age of Onset    Stroke Mother     Heart Disease Father      Social History   Substance Use Topics    Smoking status: Never Smoker    Smokeless tobacco: Never Used    Alcohol use No       Specialists/Care Team   Sunitha Cooper has established care with the following healthcare providers:  Patient Care Team:  Xiomy Gregorio MD as PCP - [place] [address]     Demographics   female  74 y.o.     General Health Questions   -During the past 4 weeks:   -how would you rate your health in general? Good   -how often have you been bothered by feeling dizzy when standing up? never   -how much have you been bothered by bodily pain? mildly   -Have you noticed any hearing difficulties? no   -has your physical and emotional health limited your social activities with family or friends? no    Emotional Health Questions   -Do you have a history of depression, anxiety, or emotional problems? no  -Over the past 2 weeks, have you felt down, depressed or hopeless? no  -Over the past 2 weeks, have you felt little interest or pleasure in doing things? no    Health Habits   Please describe your diet habits: Self grade C- average  Do you get 5 servings of fruits or vegetables daily? yes  Do you exercise regularly? no    Alcohol Risk Factor Screening: On any occasion during the past 3 months, have you had more than 4 drinks containing alcohol? No     Do you average more than 14 drinks per week? No       Activities of Daily Living and Functional Status   -Do you need help with eating, walking, dressing, bathing, toileting, the phone, transportation, shopping, preparing meals, housework, laundry, medications or managing money? no  -In the past four weeks, was someone available to help you if you needed and wanted help with anything? yes  -Are you confident are you that you can control and manage most of your health problems? yes  -Have you been given information to help you keep track of your medications? yes  -How often do you have trouble taking your medications as prescribed? occasionally    Fall Risk and Home Safety   Have you fallen 2 or more times in the past year? no  Does your home have rugs in the hallway, lack grab bars in the bathroom, lack handrails on the stairs or have poor lighting? no  Do you have smoke detectors and check them regularly? yes  Do you have difficulties driving a car? no  Do you always fasten your seat belt when you are in a car? yes    Review of Systems (if indicated for problems addressed today)   Review of Systems   Constitutional: Negative. Negative for chills, fever, malaise/fatigue and weight loss. HENT: Negative. Negative for hearing loss. Eyes: Negative. Negative for blurred vision and double vision. Respiratory: Negative. Negative for cough, hemoptysis, sputum production and shortness of breath. Cardiovascular: Negative. Negative for chest pain, palpitations and orthopnea. Gastrointestinal: Negative. Negative for abdominal pain, blood in stool, heartburn, nausea and vomiting. Genitourinary: Negative. Negative for dysuria, frequency and urgency. Musculoskeletal: Positive for joint pain.  Negative for back pain, myalgias and neck pain. Right hip arthritis. Skin: Negative. Negative for rash. Neurological: Negative. Negative for dizziness, tingling, tremors, weakness and headaches. Endo/Heme/Allergies: Negative. Psychiatric/Behavioral: Negative. Negative for depression. Physical Examination     Wt Readings from Last 3 Encounters:   02/05/18 196 lb 12.8 oz (89.3 kg)   10/25/17 194 lb (88 kg)   09/11/17 197 lb (89.4 kg)     Temp Readings from Last 3 Encounters:   02/05/18 97.7 °F (36.5 °C) (Oral)   10/25/17 97.9 °F (36.6 °C) (Oral)   09/11/17 97.3 °F (36.3 °C) (Oral)     BP Readings from Last 3 Encounters:   02/05/18 112/57   10/25/17 116/53   09/11/17 110/73     Pulse Readings from Last 3 Encounters:   02/05/18 67   10/25/17 61   09/11/17 69       Body mass index is 31.29 kg/(m^2). No exam data present  Was the patient's timed Up & Go test unsteady or longer than 10 seconds? no    Evaluation of Cognitive Function   Mood/affect:  happy  Orientation: Person, Place, Time and Situation  Appearance: age appropriate and casually dressed  Family member/caregiver input: no    Additional exam if indicated for problems addressed today:  Physical Exam   Constitutional: She is oriented to person, place, and time. She appears well-developed and well-nourished. No distress. HENT:   Head: Normocephalic and atraumatic. Mouth/Throat: Oropharynx is clear and moist.   Eyes: Conjunctivae are normal. No scleral icterus. Neck: No thyromegaly present. No carotid bruit. Cardiovascular: Normal rate, regular rhythm and normal heart sounds. Exam reveals no gallop and no friction rub. No murmur heard. Pulmonary/Chest: Effort normal and breath sounds normal. She has no wheezes. She has no rales. Abdominal: Soft. Bowel sounds are normal. She exhibits no distension. There is no tenderness. There is no rebound and no guarding. Musculoskeletal: She exhibits no edema.    Lymphadenopathy:     She has no cervical adenopathy. Neurological: She is alert and oriented to person, place, and time. Skin: Skin is warm and dry. No rash noted. She is not diaphoretic. Psychiatric: She has a normal mood and affect. Her behavior is normal. Thought content normal.   Nursing note and vitals reviewed. Advice/Referrals/Counseling (as indicated)   Education and counseling provided for any problems identified above:     Preventive Services     (Preventive care checklist to be included in patient instructions)  Discussed today Done Previously Not Needed     x  Pneumococcal vaccines    x  Flu vaccine     x Hepatitis B vaccine (if at risk)    x  Shingles vaccine    x  TDAP vaccine    x  Mammogram     x Pap smear    x  Colorectal cancer screening     x Low-dose CT for lung cancer screening    x  Bone density test    x  Glaucoma screening    x  Cholesterol test    x  Diabetes screening test      x Diabetes self-management class      Nutritionist referral for diabetes or renal disease     Discussion of Advance Directive   Discussed with Belle Strong. Sandeep Cancino her ability to prepare and advance directive in the case that an injury or illness causes her to be unable to make health care decisions. Assessment/Plan   Z00.00    ICD-10-CM ICD-9-CM    1. Medicare annual wellness visit, subsequent Z00.00 V70.0    2. Hypercholesteremia E78.00 272.0    3. Mild intermittent asthma without complication E14.19 511.04        No orders of the defined types were placed in this encounter.       Patient Care Team:  Andrés Sanchez MD as PCP - General    Follow-up Disposition:  Future Appointments  Date Time Provider Jero Glez   4/25/2018 9:35 AM MD Sarah Mc MD

## 2018-04-04 DIAGNOSIS — E78.00 HYPERCHOLESTEREMIA: Chronic | ICD-10-CM

## 2018-04-04 NOTE — TELEPHONE ENCOUNTER
Last office visit was 2/5/18. Last labs on 10/25/17  Please advise since Dr. Jenna Martinez is out of the office, thank you.

## 2018-04-06 RX ORDER — ATORVASTATIN CALCIUM 40 MG/1
TABLET, FILM COATED ORAL
Qty: 90 TAB | Refills: 0 | Status: SHIPPED | OUTPATIENT
Start: 2018-04-06 | End: 2018-04-25 | Stop reason: SDUPTHER

## 2018-04-06 RX ORDER — OMEPRAZOLE 20 MG/1
CAPSULE, DELAYED RELEASE ORAL
Qty: 90 CAP | Refills: 0 | Status: SHIPPED | OUTPATIENT
Start: 2018-04-06 | End: 2018-04-25 | Stop reason: SDUPTHER

## 2018-04-25 ENCOUNTER — OFFICE VISIT (OUTPATIENT)
Dept: FAMILY MEDICINE CLINIC | Age: 73
End: 2018-04-25

## 2018-04-25 VITALS
TEMPERATURE: 98.6 F | DIASTOLIC BLOOD PRESSURE: 74 MMHG | HEIGHT: 67 IN | OXYGEN SATURATION: 96 % | SYSTOLIC BLOOD PRESSURE: 129 MMHG | RESPIRATION RATE: 20 BRPM | BODY MASS INDEX: 30.76 KG/M2 | HEART RATE: 60 BPM | WEIGHT: 196 LBS

## 2018-04-25 DIAGNOSIS — I63.9 CEREBROVASCULAR ACCIDENT (CVA), UNSPECIFIED MECHANISM (HCC): Chronic | ICD-10-CM

## 2018-04-25 DIAGNOSIS — E53.8 VITAMIN B12 DEFICIENCY: ICD-10-CM

## 2018-04-25 DIAGNOSIS — J41.1 BRONCHITIS, MUCOPURULENT RECURRENT (HCC): Chronic | ICD-10-CM

## 2018-04-25 DIAGNOSIS — E55.9 VITAMIN D DEFICIENCY: Chronic | ICD-10-CM

## 2018-04-25 DIAGNOSIS — E78.00 HYPERCHOLESTEREMIA: Primary | Chronic | ICD-10-CM

## 2018-04-25 DIAGNOSIS — J45.20 MILD INTERMITTENT ASTHMA WITHOUT COMPLICATION: Chronic | ICD-10-CM

## 2018-04-25 DIAGNOSIS — K27.9 PUD (PEPTIC ULCER DISEASE): ICD-10-CM

## 2018-04-25 RX ORDER — OMEPRAZOLE 20 MG/1
CAPSULE, DELAYED RELEASE ORAL
Qty: 90 CAP | Refills: 1 | Status: SHIPPED | OUTPATIENT
Start: 2018-04-25 | End: 2019-12-05 | Stop reason: SDUPTHER

## 2018-04-25 RX ORDER — ATORVASTATIN CALCIUM 40 MG/1
TABLET, FILM COATED ORAL
Qty: 90 TAB | Refills: 1 | Status: SHIPPED | OUTPATIENT
Start: 2018-04-25 | End: 2019-06-06 | Stop reason: SDUPTHER

## 2018-04-25 NOTE — ASSESSMENT & PLAN NOTE
Well Controlled, based on history, physical exam and review of pertinent labs, studies and medications; meds reconciled; continue current treatment plan. Key COPD Medications             albuterol (PROVENTIL HFA, VENTOLIN HFA, PROAIR HFA) 90 mcg/actuation inhaler  (Taking) Take 2 Puffs by inhalation every four (4) hours as needed for Wheezing. Indications: ACUTE ASTHMA ATTACK    albuterol sulfate (PROVENTIL;VENTOLIN) 2.5 mg/0.5 mL nebu nebulizer solution  (Taking) 0.5 mL by Nebulization route every four (4) hours as needed for Wheezing.  Indications: ACUTE ASTHMA ATTACK, BRONCHOSPASM PREVENTION        Lab Results   Component Value Date/Time    WBC 5.1 10/25/2017 03:55 PM    HGB 12.6 10/25/2017 03:55 PM    HCT 37.8 10/25/2017 03:55 PM    PLATELET 595 45/26/3798 03:55 PM

## 2018-04-25 NOTE — PATIENT INSTRUCTIONS
Learning About High Cholesterol  What is high cholesterol? Cholesterol is a type of fat in your blood. It is needed for many body functions, such as making new cells. Cholesterol is made by your body. It also comes from food you eat. If you have too much cholesterol, it starts to build up in your arteries. This is called hardening of the arteries, or atherosclerosis. High cholesterol raises your risk of a heart attack and stroke. There are different types of cholesterol. LDL is the \"bad\" cholesterol. High LDL can raise your risk for heart disease, heart attack, and stroke. HDL is the \"good\" cholesterol. High HDL is linked with a lower risk for heart disease, heart attack, and stroke. Your cholesterol levels help your doctor find out your risk for having a heart attack or stroke. How can you prevent high cholesterol? A heart-healthy lifestyle can help you prevent high cholesterol. This lifestyle helps lower your risk for a heart attack and stroke. · Eat heart-healthy foods. ¨ Eat fruits, vegetables, whole grains (like oatmeal), dried beans and peas, nuts and seeds, soy products (like tofu), and fat-free or low-fat dairy products. ¨ Replace butter, margarine, and hydrogenated or partially hydrogenated oils with olive and canola oils. (Canola oil margarine without trans fat is fine.)  ¨ Replace red meat with fish, poultry, and soy protein (like tofu). ¨ Limit processed and packaged foods like chips, crackers, and cookies. · Be active. Exercise can improve your cholesterol level. Get at least 30 minutes of exercise on most days of the week. Walking is a good choice. You also may want to do other activities, such as running, swimming, cycling, or playing tennis or team sports. · Stay at a healthy weight. Lose weight if you need to. · Don't smoke. If you need help quitting, talk to your doctor about stop-smoking programs and medicines. These can increase your chances of quitting for good.   How is high cholesterol treated? The goal of treatment is to reduce your chances of having a heart attack or stroke. The goal is not to lower your cholesterol numbers only. · You may make lifestyle changes, such as eating healthy foods, not smoking, losing weight, and being more active. · You may have to take medicine. Follow-up care is a key part of your treatment and safety. Be sure to make and go to all appointments, and call your doctor if you are having problems. It's also a good idea to know your test results and keep a list of the medicines you take. Where can you learn more? Go to http://chuck-hank.info/. Enter F710 in the search box to learn more about \"Learning About High Cholesterol. \"  Current as of: September 21, 2016  Content Version: 11.4  © 5225-0424 [employer]. Care instructions adapted under license by [employer] (which disclaims liability or warranty for this information). If you have questions about a medical condition or this instruction, always ask your healthcare professional. [address] any warranty or liability for your use of this information.

## 2018-04-25 NOTE — PROGRESS NOTES
There are no preventive care reminders to display for this patient. Body mass index is 31.16 kg/(m^2). 1. Have you been to the ER, urgent care clinic since your last visit? Hospitalized since your last visit? No    2. Have you seen or consulted any other health care providers outside of the [address] [place] [address] since your last visit? Include any pap smears or colon screening.  No  Reviewed record in preparation for visit and have necessary documentation  Pt did not bring medication to office visit for review  Information was given to pt on Advanced Directives, Living Will  Information was given on Shingles Vaccine  opportunity was given for questions  Goals that were addressed and/or need to be completed during or after this appointment include

## 2018-04-25 NOTE — PROGRESS NOTES
Progress Note    Patient: Fany Riggs MRN: [id number]  SSN: [id number]    YOB: 1945  Age: 74 y.o. Sex: female        Chief Complaint   Patient presents with    Cholesterol Problem    Vitamin D Deficiency    Labs         Subjective:     Encounter Diagnoses   Name Primary?  Hypercholesteremia:  Cardiovascular risks for her are: LDL goal is under 80  hyperlipidemia  prior CVA/TIA or known carotid artery disease. Currently she takes Lipitor (atorvastatin) , 40 mg. Lab Results   Component Value Date/Time    Cholesterol, total 147 10/25/2017 03:55 PM    HDL Cholesterol 51 10/25/2017 03:55 PM    LDL, calculated 78 10/25/2017 03:55 PM    Triglyceride 88 10/25/2017 03:55 PM    CHOL/HDL Ratio 2.6 09/21/2010 08:03 AM     Lab Results   Component Value Date/Time    ALT (SGPT) 21 10/25/2017 03:55 PM    AST (SGOT) 27 10/25/2017 03:55 PM    Alk. phosphatase 82 10/25/2017 03:55 PM    Bilirubin, direct 0.24 08/18/2017 12:38 PM    Bilirubin, total 0.9 10/25/2017 03:55 PM      Myalgias: No   Fatigue: No   Other side effects: no  Wt Readings from Last 3 Encounters:   04/25/18 196 lb (88.9 kg)   02/05/18 196 lb 12.8 oz (89.3 kg)   10/25/17 194 lb (88 kg)     The patient is aware of our goal to reduce or eliminate the long term problems (such as strokes and heart attacks) related to poorly controlled hyperlipidemia. Yes    Mild intermittent asthma without complication:  asthma, not currently in exacerbation. Asthma symptoms occur: infrequently. Wheezing when present is described as mild and easily relieved with rescue bronchodilator. Current limitations in activity from asthma: none. SpO2 Readings from Last 3 Encounters:   04/25/18 96%   02/05/18 95%   10/25/17 95%     Frequency of use of quick-relief meds: rare. Medication compliance: Good  Patient does not smoke cigarettes. Monitors Peak Flow at home: no         Vitamin D deficiency:  No sx. Due for testing.   Lab Results   Component Value Date/Time    Vitamin D 25-Hydroxy 26 (L) 09/21/2010 08:03 AM    VITAMIN D, 25-HYDROXY 36.6 10/25/2017 03:55 PM            Cerebrovascular accident (CVA), unspecified mechanism ([facility] [address]): Previous CVA without recurrent symptoms and no symptoms of TIA. She is on aspirin alone as a blood thinner. Even with low-dose aspirin she has easy bruising on her forearms.  PUD (peptic ulcer disease): He had a severe bout of arthritis several years ago and she took high doses of Advil. She ended up with peptic/gastric ulcer with internal bleeding and anemia plus syncope. Since that time she has been on omeprazole she no longer takes Advil. She wonders about the long-term risk of the omeprazole. I told her I thought it was okay to try to taper off the omeprazole-month 1 every other day, months to q. 3 days month 3 stop the omeprazole and use ranitidine 150 mg at bedtime. She will try this and tell me if she has recurrent symptoms.  Vitamin B12 deficiency:  No symptoms. Long-term use of omeprazole. She is on B complex. We will recheck her level today. Current and past medical information:    Current Medications after this visit[de-identified]   Current Outpatient Prescriptions   Medication Sig    omeprazole (PRILOSEC) 20 mg capsule TAKE ONE CAPSULE BY MOUTH EVERY DAY    atorvastatin (LIPITOR) 40 mg tablet TAKE ONE TABLET BY MOUTH EVERY DAY *STOP NIACIN*    albuterol (PROVENTIL HFA, VENTOLIN HFA, PROAIR HFA) 90 mcg/actuation inhaler Take 2 Puffs by inhalation every four (4) hours as needed for Wheezing. Indications: ACUTE ASTHMA ATTACK    albuterol sulfate (PROVENTIL;VENTOLIN) 2.5 mg/0.5 mL nebu nebulizer solution 0.5 mL by Nebulization route every four (4) hours as needed for Wheezing. Indications: ACUTE ASTHMA ATTACK, BRONCHOSPASM PREVENTION    omega-3 fatty acids-vitamin e (FISH OIL) 1,000 mg cap Take 1 Cap by mouth two (2) times a day.  aspirin delayed-release 81 mg tablet Take 81 mg by mouth daily.     CALCIUM CARBONATE (ISAI-600 PO) Take  by mouth.  Cholecalciferol, Vitamin D3, (VITAMIN D) 2,000 unit Cap Take  by mouth.  VITAMIN B COMPLEX (VITAMIN B-100 COMPLEX PO) Take  by mouth.  triamcinolone acetonide (KENALOG) 0.1 % topical cream Apply to arms bid when itching. No current facility-administered medications for this visit. Patient Active Problem List    Diagnosis Date Noted    Cataract, nuclear sclerotic, left eye 07/10/2017    Dermatochalasis of left upper eyelid 07/10/2017    Dermatochalasis of right upper eyelid 07/10/2017    PCO (posterior capsular opacification), right 07/10/2017    Pseudophakia of right eye 07/10/2017    Arthritis pain 12/09/2014    Vitamin D deficiency 01/07/2014    Bronchitis, mucopurulent recurrent ([address] 05/14/2010    Hypercholesteremia 04/05/2010    CVA (cerebral vascular accident) ([address] 04/05/2010    Asthma 04/05/2010    Asthmatic bronchitis 04/05/2010       Past Medical History:   Diagnosis Date    Asthma 4/5/2010    Bronchitis, mucopurulent recurrent ([address] 5/14/2010    CVA (cerebral vascular accident) ([address] 4/5/2010    Hypercholesteremia 4/5/2010       No Known Allergies    Past Surgical History:   Procedure Laterality Date    HX HYSTERECTOMY      HX TONSILLECTOMY         Social History     Social History    Marital status:      Spouse name: N/A    Number of children: N/A    Years of education: N/A     Social History Main Topics    Smoking status: Never Smoker    Smokeless tobacco: Never Used    Alcohol use No    Drug use: No    Sexual activity: Not Asked     Other Topics Concern    None     Social History Narrative       Review of Systems   Constitutional: Negative. Negative for chills, fever, malaise/fatigue and weight loss. HENT: Negative. Negative for hearing loss. Eyes: Negative. Negative for blurred vision and double vision. Respiratory: Negative.   Negative for cough, hemoptysis, sputum production and shortness of breath. Cardiovascular: Negative. Negative for chest pain, palpitations and orthopnea. Gastrointestinal: Negative. Negative for abdominal pain, blood in stool, heartburn, nausea and vomiting. Genitourinary: Negative. Negative for dysuria, frequency and urgency. Musculoskeletal: Negative. Negative for back pain, myalgias and neck pain. Skin: Negative. Negative for rash. Neurological: Negative. Negative for dizziness, tingling, tremors, weakness and headaches. Endo/Heme/Allergies: Negative. Psychiatric/Behavioral: Negative. Negative for depression. Objective:     Vitals:    04/25/18 0933   BP: 129/74   Pulse: 60   Resp: 20   Temp: 98.6 °F (37 °C)   TempSrc: Oral   SpO2: 96%   Weight: 196 lb (88.9 kg)   Height: 5' 6.5\" (1.689 m)      Body mass index is 31.16 kg/(m^2). Physical Exam   Constitutional: She is oriented to person, place, and time and well-developed, well-nourished, and in no distress. No distress. HENT:   Head: Normocephalic and atraumatic. Mouth/Throat: Oropharynx is clear and moist.   Eyes: Conjunctivae are normal.   Neck: No tracheal deviation present. No thyromegaly present. Cardiovascular: Normal rate, regular rhythm and normal heart sounds. No murmur heard. Pulmonary/Chest: Effort normal and breath sounds normal. No respiratory distress. Abdominal: Soft. She exhibits no distension. Lymphadenopathy:     She has no cervical adenopathy. Neurological: She is alert and oriented to person, place, and time. Skin: Skin is warm. No rash noted. She is not diaphoretic. No erythema. Psychiatric: Mood and affect normal.   Nursing note and vitals reviewed. There are no preventive care reminders to display for this patient. Assessment and orders:     Encounter Diagnoses     ICD-10-CM ICD-9-CM   1. Hypercholesteremia E78.00 272.0   2. Mild intermittent asthma without complication M99.55 101.21   3.  Vitamin D deficiency E55.9 268.9   4. Cerebrovascular accident (CVA), unspecified mechanism ([facility] [address] I63.9 434.91   5. PUD (peptic ulcer disease) K27.9 533.90   6. Vitamin B12 deficiency E53.8 266.2       Diagnoses and all orders for this visit:    1. Hypercholesteremia-recheck  -     atorvastatin (LIPITOR) 40 mg tablet; TAKE ONE TABLET BY MOUTH EVERY DAY *STOP NIACIN*  -     LIPID PANEL  -     METABOLIC PANEL, COMPREHENSIVE    2. Mild intermittent asthma without complication-no symptoms    3. Vitamin D deficiency-level has been normal.  Plan yearly recheck. 4. Cerebrovascular accident (CVA), unspecified mechanism (HCC)-no symptoms of recurrence or TIA. -     LIPID PANEL  -     METABOLIC PANEL, COMPREHENSIVE    5. PUD (peptic ulcer disease)-see above discussion  -     omeprazole (PRILOSEC) 20 mg capsule; TAKE ONE CAPSULE BY MOUTH EVERY DAY  -     HEMOGLOBIN    6. Vitamin B12 deficiency-check B12 level due to chronic omeprazole use  -     VITAMIN B12            Plan of care:  Discussed diagnoses in detail with patient. Medication risks/benefits/side effects discussed with patient. All of the patient's questions were addressed. The patient understands and agrees with our plan of care. The patient knows to call back if they are unsure of or forget any changes we discussed today or if the symptoms change. The patient received an After-Visit Summary which contains VS, orders, medication list and allergy list. This can be used as a \"mini-medical record\" should they have to seek medical care while out of town. Patient Care Team:  Stephenie Gomez MD as PCP - General    Follow-up Disposition:  Return in about 6 months (around 10/25/2018).     Future Appointments  Date Time Provider Jero Glez   10/31/2018 9:35 AM Stephenie Gomez MD [facility] [place] SCHED       Signed By: Stephenie Gomez MD     April 25, 2018

## 2018-04-25 NOTE — ASSESSMENT & PLAN NOTE
Well Controlled, based on history, physical exam and review of pertinent labs, studies and medications; meds reconciled; continue current treatment plan. Key Neurological Meds             aspirin delayed-release 81 mg tablet  (Taking) Take 81 mg by mouth daily.         Lab Results   Component Value Date/Time    WBC 5.1 10/25/2017 03:55 PM    HGB 12.6 10/25/2017 03:55 PM    HCT 37.8 10/25/2017 03:55 PM    PLATELET 673 13/16/3321 03:55 PM    Creatinine 0.82 10/25/2017 03:55 PM    BUN 15 10/25/2017 03:55 PM

## 2018-04-25 NOTE — MR AVS SNAPSHOT
[address] [place] [address] 
[phone] Patient: Hayde Carpenter MRN: [id number] CARMENZA:[id number] Visit Information Date & Time Provider Department Dept. Phone Encounter #  
 4/25/2018  9:35 AM Alisia Palomo MD [address] [id number] Follow-up Instructions Return in about 6 months (around 10/25/2018). Upcoming Health Maintenance Date Due  
 GLAUCOMA SCREENING Q2Y 7/6/2018 BREAST CANCER SCRN MAMMOGRAM 10/30/2018 MEDICARE YEARLY EXAM 2/6/2019 Bone Densitometry 10/31/2019 DTaP/Tdap/Td series (2 - Td) 7/13/2021 COLONOSCOPY 3/17/2027 Allergies as of 4/25/2018  Review Complete On: 4/25/2018 By: Joseph Saavedra No Known Allergies Current Immunizations  Reviewed on 6/6/2016 Name Date H1N1 FLU VACCINE 11/25/2009 Influenza High Dose Vaccine PF 9/11/2017 Influenza Vaccine 10/13/2015, 10/3/2013 Influenza Vaccine (Quad) PF 12/13/2016, 11/20/2014 Influenza Vaccine Split 10/8/2012, 12/15/2011 Influenza Vaccine Whole 11/3/2010, 10/8/2009 Pneumococcal Conjugate (PCV-13) 11/10/2015 Pneumococcal Vaccine (Pcv) 10/1/2006 TDAP Vaccine 7/13/2011 ZZZ-RETIRED (DO NOT USE) Pneumococcal Vaccine (Unspecified Type) 2/21/2011 Zoster Vaccine, Live 1/21/2013 Not reviewed this visit You Were Diagnosed With   
  
 Codes Comments Hypercholesteremia    -  Primary ICD-10-CM: E78.00 ICD-9-CM: 272.0 Mild intermittent asthma without complication     SXN-00-BR: J45.20 ICD-9-CM: 493.90 Vitamin D deficiency     ICD-10-CM: E55.9 ICD-9-CM: 268.9 Cerebrovascular accident (CVA), unspecified mechanism ([facility]     ICD-10-CM: I63.9 ICD-9-CM: 434.91   
 PUD (peptic ulcer disease)     ICD-10-CM: K27.9 ICD-9-CM: 533.90 Vitamin B12 deficiency     ICD-10-CM: E53.8 ICD-9-CM: 266.2 Vitals  BP Pulse Temp Resp Height(growth percentile) Weight(growth percentile) 129/74 60 98.6 °F (37 °C) (Oral) 20 5' 6.5\" (1.689 m) 196 lb (88.9 kg) SpO2 BMI OB Status Smoking Status 96% 31.16 kg/m2 Hysterectomy Never Smoker Vitals History BMI and BSA Data Body Mass Index Body Surface Area  
 31.16 kg/m 2 2.04 m 2 Preferred Pharmacy Pharmacy Name Phone [address] [facility] [place] [phone] Your Updated Medication List  
  
   
This list is accurate as of 4/25/18  9:55 AM.  Always use your most recent med list.  
  
  
  
  
 * albuterol 90 mcg/actuation inhaler Commonly known as:  PROVENTIL HFA, VENTOLIN HFA, PROAIR HFA Take 2 Puffs by inhalation every four (4) hours as needed for Wheezing. Indications: ACUTE ASTHMA ATTACK * albuterol sulfate 2.5 mg/0.5 mL Nebu nebulizer solution Commonly known as:  PROVENTIL;VENTOLIN  
0.5 mL by Nebulization route every four (4) hours as needed for Wheezing. Indications: ACUTE ASTHMA ATTACK, BRONCHOSPASM PREVENTION  
  
 aspirin delayed-release 81 mg tablet Take 81 mg by mouth daily. atorvastatin 40 mg tablet Commonly known as:  LIPITOR  
TAKE ONE TABLET BY MOUTH EVERY DAY *STOP NIACIN* ISAI-600 PO Take  by mouth. FISH OIL 1,000 mg Cap Generic drug:  omega-3 fatty acids-vitamin e Take 1 Cap by mouth two (2) times a day. omeprazole 20 mg capsule Commonly known as:  PRILOSEC  
TAKE ONE CAPSULE BY MOUTH EVERY DAY  
  
 triamcinolone acetonide 0.1 % topical cream  
Commonly known as:  KENALOG Apply to arms bid when itching. VITAMIN B-100 COMPLEX PO Take  by mouth. VITAMIN D 2,000 unit Cap capsule Generic drug:  Cholecalciferol (Vitamin D3) Take  by mouth. * Notice: This list has 2 medication(s) that are the same as other medications prescribed for you. Read the directions carefully, and ask your doctor or other care provider to review them with you. Prescriptions Sent to Pharmacy Refills  
 omeprazole (PRILOSEC) 20 mg capsule 1 Sig: TAKE ONE CAPSULE BY MOUTH EVERY DAY Class: Normal  
 Pharmacy: [address] [place] [address] #: [phone]  
 atorvastatin (LIPITOR) 40 mg tablet 1 Sig: TAKE ONE TABLET BY MOUTH EVERY DAY *STOP NIACIN* Class: Normal  
 Pharmacy: [address] [place] [address] Ph #: [phone] We Performed the Following HEMOGLOBIN [id number] CPT(R)] LIPID PANEL [12167 CPT(R)] METABOLIC PANEL, COMPREHENSIVE [29924 CPT(R)] VITAMIN B12 H945783 CPT(R)] Follow-up Instructions Return in about 6 months (around 10/25/2018). Patient Instructions Learning About High Cholesterol What is high cholesterol? Cholesterol is a type of fat in your blood. It is needed for many body functions, such as making new cells. Cholesterol is made by your body. It also comes from food you eat. If you have too much cholesterol, it starts to build up in your arteries. This is called hardening of the arteries, or atherosclerosis. High cholesterol raises your risk of a heart attack and stroke. There are different types of cholesterol. LDL is the \"bad\" cholesterol. High LDL can raise your risk for heart disease, heart attack, and stroke. HDL is the \"good\" cholesterol. High HDL is linked with a lower risk for heart disease, heart attack, and stroke. Your cholesterol levels help your doctor find out your risk for having a heart attack or stroke. How can you prevent high cholesterol? A heart-healthy lifestyle can help you prevent high cholesterol. This lifestyle helps lower your risk for a heart attack and stroke. · Eat heart-healthy foods. ¨ Eat fruits, vegetables, whole grains (like oatmeal), dried beans and peas, nuts and seeds, soy products (like tofu), and fat-free or low-fat dairy products.  
¨ Replace butter, margarine, and hydrogenated or partially hydrogenated oils with olive and canola oils. (Canola oil margarine without trans fat is fine.) ¨ Replace red meat with fish, poultry, and soy protein (like tofu). ¨ Limit processed and packaged foods like chips, crackers, and cookies. · Be active. Exercise can improve your cholesterol level. Get at least 30 minutes of exercise on most days of the week. Walking is a good choice. You also may want to do other activities, such as running, swimming, cycling, or playing tennis or team sports. · Stay at a healthy weight. Lose weight if you need to. · Don't smoke. If you need help quitting, talk to your doctor about stop-smoking programs and medicines. These can increase your chances of quitting for good. How is high cholesterol treated? The goal of treatment is to reduce your chances of having a heart attack or stroke. The goal is not to lower your cholesterol numbers only. · You may make lifestyle changes, such as eating healthy foods, not smoking, losing weight, and being more active. · You may have to take medicine. Follow-up care is a key part of your treatment and safety. Be sure to make and go to all appointments, and call your doctor if you are having problems. It's also a good idea to know your test results and keep a list of the medicines you take. Where can you learn more? Go to http://chuck-hank.info/. Enter Z066 in the search box to learn more about \"Learning About High Cholesterol. \" Current as of: September 21, 2016 Content Version: 11.4 © 5022-3119 Healthwise, Inc[employer]. Care instructions adapted under license by [employer] (which disclaims liability or warranty for this information). If you have questions about a medical condition or this instruction, always ask your healthcare professional. [address] any warranty or liability for your use of this information. Introducing [facility]!    
 Dear Katarzyna Bruno: 
Thank you for requesting a Navdy account. Our records indicate that you have previously registered for a Navdy account but its currently inactive. Please call our Navdy support line at [phone]. Additional Information If you have questions, please visit the Frequently Asked Questions section of the Navdy website at [email or web address] Remember, Navdy is NOT to be used for urgent needs. For medical emergencies, dial 911. Now available from your iPhone and Android! Please provide this summary of care documentation to your next provider. Your primary care clinician is listed as [address]. If you have any questions after today's visit, please call [phone].

## 2018-04-26 LAB
ALBUMIN SERPL-MCNC: 4.3 G/DL (ref 3.5–4.8)
ALBUMIN/GLOB SERPL: 1.7 {RATIO} (ref 1.2–2.2)
ALP SERPL-CCNC: 85 IU/L (ref 39–117)
ALT SERPL-CCNC: 23 IU/L (ref 0–32)
AST SERPL-CCNC: 23 IU/L (ref 0–40)
BILIRUB SERPL-MCNC: 0.7 MG/DL (ref 0–1.2)
BUN SERPL-MCNC: 14 MG/DL (ref 8–27)
BUN/CREAT SERPL: 19 (ref 12–28)
CALCIUM SERPL-MCNC: 9.2 MG/DL (ref 8.7–10.3)
CHLORIDE SERPL-SCNC: 100 MMOL/L (ref 96–106)
CHOLEST SERPL-MCNC: 151 MG/DL (ref 100–199)
CO2 SERPL-SCNC: 24 MMOL/L (ref 18–29)
CREAT SERPL-MCNC: 0.72 MG/DL (ref 0.57–1)
GFR SERPLBLD CREATININE-BSD FMLA CKD-EPI: 83 ML/MIN/1.73
GFR SERPLBLD CREATININE-BSD FMLA CKD-EPI: 95 ML/MIN/1.73
GLOBULIN SER CALC-MCNC: 2.5 G/DL (ref 1.5–4.5)
GLUCOSE SERPL-MCNC: 95 MG/DL (ref 65–99)
HDLC SERPL-MCNC: 51 MG/DL
HGB BLD-MCNC: 12.8 G/DL (ref 11.1–15.9)
LDLC SERPL CALC-MCNC: 80 MG/DL (ref 0–99)
POTASSIUM SERPL-SCNC: 4.6 MMOL/L (ref 3.5–5.2)
PROT SERPL-MCNC: 6.8 G/DL (ref 6–8.5)
SODIUM SERPL-SCNC: 139 MMOL/L (ref 134–144)
TRIGL SERPL-MCNC: 99 MG/DL (ref 0–149)
VIT B12 SERPL-MCNC: 544 PG/ML (ref 232–1245)
VLDLC SERPL CALC-MCNC: 20 MG/DL (ref 5–40)

## 2018-06-01 ENCOUNTER — OFFICE VISIT (OUTPATIENT)
Dept: FAMILY MEDICINE CLINIC | Age: 73
End: 2018-06-01

## 2018-06-01 VITALS
OXYGEN SATURATION: 95 % | SYSTOLIC BLOOD PRESSURE: 135 MMHG | BODY MASS INDEX: 30.92 KG/M2 | TEMPERATURE: 98.1 F | RESPIRATION RATE: 20 BRPM | HEIGHT: 67 IN | WEIGHT: 197 LBS | DIASTOLIC BLOOD PRESSURE: 65 MMHG | HEART RATE: 69 BPM

## 2018-06-01 DIAGNOSIS — L03.818 CELLULITIS OF OTHER SPECIFIED SITE: Primary | ICD-10-CM

## 2018-06-01 RX ORDER — DOXYCYCLINE 100 MG/1
100 TABLET ORAL 2 TIMES DAILY
Qty: 14 TAB | Refills: 0 | Status: SHIPPED | OUTPATIENT
Start: 2018-06-01 | End: 2018-06-08

## 2018-06-01 NOTE — PROGRESS NOTES
1. Have you been to the ER, urgent care clinic since your last visit? Hospitalized since your last visit? No    2. Have you seen or consulted any other health care providers outside of the [address] [place] [address] since your last visit? Include any pap smears or colon screening.  No  Reviewed record in preparation for visit and have necessary documentation  Pt did not bring medication to office visit for review    Goals that were addressed and/or need to be completed during or after this appointment include   Health Maintenance Due   Topic Date Due    GLAUCOMA SCREENING Q2Y  07/06/2018     Will request records from Dr Kenna Bowers

## 2018-06-01 NOTE — PATIENT INSTRUCTIONS
Cellulitis: Care Instructions  Your Care Instructions    Cellulitis is a skin infection. It often occurs after a break in the skin from a scrape, cut, bite, or puncture, or after a rash. The doctor has checked you carefully, but problems can develop later. If you notice any problems or new symptoms, get medical treatment right away. Follow-up care is a key part of your treatment and safety. Be sure to make and go to all appointments, and call your doctor if you are having problems. It's also a good idea to know your test results and keep a list of the medicines you take. How can you care for yourself at home? · Take your antibiotics as directed. Do not stop taking them just because you feel better. You need to take the full course of antibiotics. · Prop up the infected area on pillows to reduce pain and swelling. Try to keep the area above the level of your heart as often as you can. · If your doctor told you how to care for your wound, follow your doctor's instructions. If you did not get instructions, follow this general advice:  ¨ Wash the wound with clean water 2 times a day. Don't use hydrogen peroxide or alcohol, which can slow healing. ¨ You may cover the wound with a thin layer of petroleum jelly, such as Vaseline, and a nonstick bandage. ¨ Apply more petroleum jelly and replace the bandage as needed. · Be safe with medicines. Take pain medicines exactly as directed. ¨ If the doctor gave you a prescription medicine for pain, take it as prescribed. ¨ If you are not taking a prescription pain medicine, ask your doctor if you can take an over-the-counter medicine. To prevent cellulitis in the future  · Try to prevent cuts, scrapes, or other injuries to your skin. Cellulitis most often occurs where there is a break in the skin. · If you get a scrape, cut, mild burn, or bite, wash the wound with clean water as soon as you can to help avoid infection.  Don't use hydrogen peroxide or alcohol, which can slow healing. · If you have swelling in your legs (edema), support stockings and good skin care may help prevent leg sores and cellulitis. · Take care of your feet, especially if you have diabetes or other conditions that increase the risk of infection. Wear shoes and socks. Do not go barefoot. If you have athlete's foot or other skin problems on your feet, talk to your doctor about how to treat them. When should you call for help? Call your doctor now or seek immediate medical care if:  ? · You have signs that your infection is getting worse, such as:  ¨ Increased pain, swelling, warmth, or redness. ¨ Red streaks leading from the area. ¨ Pus draining from the area. ¨ A fever. ? · You get a rash. ? Watch closely for changes in your health, and be sure to contact your doctor if:  ? · You are not getting better after 1 day (24 hours). ? · You do not get better as expected. Where can you learn more? Go to http://chuck-hank.info/. Sera Yu in the search box to learn more about \"Cellulitis: Care Instructions. \"  Current as of: October 13, 2016  Content Version: 11.4  © 7371-1759 Healthwise, [employer]. Care instructions adapted under license by [employer] (which disclaims liability or warranty for this information). If you have questions about a medical condition or this instruction, always ask your healthcare professional. [place] [address] any warranty or liability for your use of this information.

## 2018-06-01 NOTE — PROGRESS NOTES
Ragini Cote  74 y.o. female  1945  [address] [facility]  [address] [facility] [address]  [id number]     [facility]: Progress Note       Encounter Date: 6/1/2018    Chief Complaint   Patient presents with    Skin Problem     History of Present Illness   Ragini Cote is a 74 y.o. female who presents to clinic today for:  ~1 week ago she noted an area on her left breast. Possibly a bug bite. She has a hx of cellulitis in the past on her shoulder. She states the area is  tender at the site and burns. Treatment-bandage & Neosporin. Relief with warm water in the shower. Denies- itch, odor, drainage, no issues with ROM, numbness tingling in extremities, fever, breast skin changes, nipple drainage. Health Maintenance    Health Maintenance Due   Topic Date Due    GLAUCOMA SCREENING Q2Y  07/06/2018     Review of Systems   Review of Systems   Constitutional: Negative. HENT: Negative. Eyes: Negative. Respiratory: Negative. Cardiovascular: Negative. Gastrointestinal: Negative. Genitourinary: Negative. Musculoskeletal: Negative. Skin: Positive for rash. Neurological: Negative. Endo/Heme/Allergies: Negative. Psychiatric/Behavioral: Negative. Vitals/Objective:     Vitals:    06/01/18 1036   BP: 135/65   Pulse: 69   Resp: 20   Temp: 98.1 °F (36.7 °C)   TempSrc: Oral   SpO2: 95%   Weight: 197 lb (89.4 kg)   Height: 5' 6.5\" (1.689 m)     Body mass index is 31.32 kg/(m^2). Physical Exam   Constitutional: She is oriented to person, place, and time. She appears well-developed and well-nourished. She is active and cooperative. Neck: Normal range of motion, full passive range of motion without pain and phonation normal. Neck supple. Cardiovascular: Normal rate, regular rhythm, normal heart sounds and normal pulses. Pulmonary/Chest: Effort normal and breath sounds normal. Left breast exhibits tenderness. Left breast with ~1cm-scabbed area noted.  Surrounding scabbed area-erythema, tender and warm. Abdominal: Bowel sounds are normal.   Musculoskeletal: Normal range of motion. Neurological: She is alert and oriented to person, place, and time. Skin: Skin is warm and dry. Psychiatric: She has a normal mood and affect. Her speech is normal and behavior is normal. Judgment and thought content normal. Cognition and memory are normal.       No results found for this or any previous visit (from the past 24 hour(s)). Assessment and Plan:   1. Cellulitis of other specified site    - doxycycline (ADOXA) 100 mg tablet; Take 1 Tab by mouth two (2) times a day for 7 days. Dispense: 14 Tab; Refill: 0    Discussed ED parameters-fever, and erythema that has grown outside of sharpie lines, not responding to abx withing ~48 hours. Patient to return in 7 days to follow up on site. Patient had Doxycycline in the past with great results-reviewed kidney and liver function and allergies. Advised to leave open to air as much as possible and also to discontinue the use of the neosporin. I have discussed the diagnosis with the patient and the intended plan as seen in the above orders. she has expressed understanding. The patient has received an after-visit summary and questions were answered concerning future plans. I have discussed medication side effects and warnings with the patient as well. Electronically Signed: Lowell Morel NP     History/Allergies   Patients past medical, surgical and family histories were reviewed and updated.     Past Medical History:   Diagnosis Date    Asthma 4/5/2010    Bronchitis, mucopurulent recurrent ([address] 5/14/2010    CVA (cerebral vascular accident) ([address] 4/5/2010    Hypercholesteremia 4/5/2010      Past Surgical History:   Procedure Laterality Date    HX HYSTERECTOMY      HX TONSILLECTOMY       Family History   Problem Relation Age of Onset    Stroke Mother     Heart Disease Father      Social History     Social History    Marital status:      Spouse name: N/A    Number of children: N/A    Years of education: N/A     Occupational History    Not on file. Social History Main Topics    Smoking status: Never Smoker    Smokeless tobacco: Never Used    Alcohol use No    Drug use: No    Sexual activity: Not on file     Other Topics Concern    Not on file     Social History Narrative         No Known Allergies    Disposition     Follow-up Disposition:  Return in about 7 days (around 6/8/2018), or if symptoms worsen or fail to improve. Future Appointments  Date Time Provider Jero Glez   6/8/2018 9:20 AM Crystal Mario NP [facility] SCHED   10/31/2018 9:35 AM Eloise Wadsworth MD [facility] [place] SCHED            Current Medications after this visit     Current Outpatient Prescriptions   Medication Sig    doxycycline (ADOXA) 100 mg tablet Take 1 Tab by mouth two (2) times a day for 7 days.  omeprazole (PRILOSEC) 20 mg capsule TAKE ONE CAPSULE BY MOUTH EVERY DAY    atorvastatin (LIPITOR) 40 mg tablet TAKE ONE TABLET BY MOUTH EVERY DAY *STOP NIACIN*    omega-3 fatty acids-vitamin e (FISH OIL) 1,000 mg cap Take 1 Cap by mouth two (2) times a day.  aspirin delayed-release 81 mg tablet Take 81 mg by mouth daily.  CALCIUM CARBONATE (ISAI-600 PO) Take  by mouth.  Cholecalciferol, Vitamin D3, (VITAMIN D) 2,000 unit Cap Take  by mouth.  VITAMIN B COMPLEX (VITAMIN B-100 COMPLEX PO) Take  by mouth. No current facility-administered medications for this visit.       Medications Discontinued During This Encounter   Medication Reason    albuterol (PROVENTIL HFA, VENTOLIN HFA, PROAIR HFA) 90 mcg/actuation inhaler Therapy Completed    albuterol sulfate (PROVENTIL;VENTOLIN) 2.5 mg/0.5 mL nebu nebulizer solution Therapy Completed    triamcinolone acetonide (KENALOG) 0.1 % topical cream Therapy Completed

## 2018-06-01 NOTE — MR AVS SNAPSHOT
[address] [facility] 
 
 
 [address] [facility] [address] [address] 
[phone] Patient: Fany Riggs MRN: [id number] TONG:[birth year] Visit Information Date & Time Provider Department Dept. Phone Encounter #  
 6/1/2018 10:40 AM Aleja Chiu  [facility] [phone] [id number] Follow-up Instructions Return in about 7 days (around 6/8/2018), or if symptoms worsen or fail to improve. Your Appointments 10/31/2018  9:35 AM  
ROUTINE CARE with Ami Jordan MD  
[address] [facility] [address] [facility]) Appt Note: 6 mo f/u-Hypercholesteremia (  
 [address] [place] [address] [address]  
[place] [address] [place] [id number] Upcoming Health Maintenance Date Due  
 GLAUCOMA SCREENING Q2Y 7/6/2018 Influenza Age 5 to Adult 8/1/2018 BREAST CANCER SCRN MAMMOGRAM 10/30/2018 MEDICARE YEARLY EXAM 2/6/2019 Bone Densitometry 10/31/2019 DTaP/Tdap/Td series (2 - Td) 7/13/2021 COLONOSCOPY 3/17/2027 Allergies as of 6/1/2018  Review Complete On: 6/1/2018 By: Sonal Culver LPN No Known Allergies Current Immunizations  Reviewed on 6/6/2016 Name Date H1N1 FLU VACCINE 11/25/2009 Influenza High Dose Vaccine PF 9/11/2017 Influenza Vaccine 10/13/2015, 10/3/2013 Influenza Vaccine (Quad) PF 12/13/2016, 11/20/2014 Influenza Vaccine Split 10/8/2012, 12/15/2011 Influenza Vaccine Whole 11/3/2010, 10/8/2009 Pneumococcal Conjugate (PCV-13) 11/10/2015 Pneumococcal Vaccine (Pcv) 10/1/2006 TDAP Vaccine 7/13/2011 ZZZ-RETIRED (DO NOT USE) Pneumococcal Vaccine (Unspecified Type) 2/21/2011 Zoster Vaccine, Live 1/21/2013 Not reviewed this visit You Were Diagnosed With   
  
 Codes Comments Cellulitis of other specified site    -  Primary ICD-10-CM: L03.818 ICD-9-CM: 682.8 Vitals  BP Pulse Temp Resp Height(growth percentile) Weight(growth percentile) 135/65 (BP 1 Location: Left arm, BP Patient Position: Sitting) 69 98.1 °F (36.7 °C) (Oral) 20 5' 6.5\" (1.689 m) 197 lb (89.4 kg) SpO2 BMI OB Status Smoking Status 95% 31.32 kg/m2 Hysterectomy Never Smoker Vitals History BMI and BSA Data Body Mass Index Body Surface Area  
 31.32 kg/m 2 2.05 m 2 Preferred Pharmacy Pharmacy Name Phone [facility] - [address] [place] [address] [phone] Your Updated Medication List  
  
   
This list is accurate as of 6/1/18 11:01 AM.  Always use your most recent med list.  
  
  
  
  
 aspirin delayed-release 81 mg tablet Take 81 mg by mouth daily. atorvastatin 40 mg tablet Commonly known as:  LIPITOR  
TAKE ONE TABLET BY MOUTH EVERY DAY *STOP NIACIN* ISAI-600 PO Take  by mouth. doxycycline 100 mg tablet Commonly known as:  ADOXA Take 1 Tab by mouth two (2) times a day for 7 days. FISH OIL 1,000 mg Cap Generic drug:  omega-3 fatty acids-vitamin e Take 1 Cap by mouth two (2) times a day. omeprazole 20 mg capsule Commonly known as:  PRILOSEC  
TAKE ONE CAPSULE BY MOUTH EVERY DAY  
  
 VITAMIN B-100 COMPLEX PO Take  by mouth. VITAMIN D 2,000 unit Cap capsule Generic drug:  Cholecalciferol (Vitamin D3) Take  by mouth. Prescriptions Sent to Pharmacy Refills  
 doxycycline (ADOXA) 100 mg tablet 0 Sig: Take 1 Tab by mouth two (2) times a day for 7 days. Class: Normal  
 Pharmacy: [address] [place] [address] #: [phone] Route: Oral  
  
Follow-up Instructions Return in about 7 days (around 6/8/2018), or if symptoms worsen or fail to improve. Patient Instructions Cellulitis: Care Instructions Your Care Instructions Cellulitis is a skin infection.  It often occurs after a break in the skin from a scrape, cut, bite, or puncture, or after a rash. The doctor has checked you carefully, but problems can develop later. If you notice any problems or new symptoms, get medical treatment right away. Follow-up care is a key part of your treatment and safety. Be sure to make and go to all appointments, and call your doctor if you are having problems. It's also a good idea to know your test results and keep a list of the medicines you take. How can you care for yourself at home? · Take your antibiotics as directed. Do not stop taking them just because you feel better. You need to take the full course of antibiotics. · Prop up the infected area on pillows to reduce pain and swelling. Try to keep the area above the level of your heart as often as you can. · If your doctor told you how to care for your wound, follow your doctor's instructions. If you did not get instructions, follow this general advice: ¨ Wash the wound with clean water 2 times a day. Don't use hydrogen peroxide or alcohol, which can slow healing. ¨ You may cover the wound with a thin layer of petroleum jelly, such as Vaseline, and a nonstick bandage. ¨ Apply more petroleum jelly and replace the bandage as needed. · Be safe with medicines. Take pain medicines exactly as directed. ¨ If the doctor gave you a prescription medicine for pain, take it as prescribed. ¨ If you are not taking a prescription pain medicine, ask your doctor if you can take an over-the-counter medicine. To prevent cellulitis in the future · Try to prevent cuts, scrapes, or other injuries to your skin. Cellulitis most often occurs where there is a break in the skin. · If you get a scrape, cut, mild burn, or bite, wash the wound with clean water as soon as you can to help avoid infection. Don't use hydrogen peroxide or alcohol, which can slow healing. · If you have swelling in your legs (edema), support stockings and good skin care may help prevent leg sores and cellulitis.  
· Take care of your feet, especially if you have diabetes or other conditions that increase the risk of infection. Wear shoes and socks. Do not go barefoot. If you have athlete's foot or other skin problems on your feet, talk to your doctor about how to treat them. When should you call for help? Call your doctor now or seek immediate medical care if: 
? · You have signs that your infection is getting worse, such as: 
¨ Increased pain, swelling, warmth, or redness. ¨ Red streaks leading from the area. ¨ Pus draining from the area. ¨ A fever. ? · You get a rash. ? Watch closely for changes in your health, and be sure to contact your doctor if: 
? · You are not getting better after 1 day (24 hours). ? · You do not get better as expected. Where can you learn more? Go to http://chuckCoco Controllerhank.info/. Bryan Donahue in the search box to learn more about \"Cellulitis: Care Instructions. \" Current as of: October 13, 2016 Content Version: 11.4 © 7003-5112 [employer]. Care instructions adapted under license by [employer] (which disclaims liability or warranty for this information). If you have questions about a medical condition or this instruction, always ask your healthcare professional. [address] any warranty or liability for your use of this information. Introducing [facility]! Rito Paige introduces TalkPlus patient portal. Now you can access parts of your medical record, email your doctor's office, and request medication refills online. 1. In your internet browser, go to [email or web address] 2. Click on the First Time User? Click Here link in the Sign In box. You will see the New Member Sign Up page. 3. Enter your TalkPlus Access Code exactly as it appears below. You will not need to use this code after youve completed the sign-up process. If you do not sign up before the expiration date, you must request a new code. · Saqina Access Code: [email or web address] Expires: 8/30/2018 10:32 AM 
 
4. Enter the last four digits of your Social Security Number (xxxx) and Date of Birth (mm/dd/yyyy) as indicated and click Submit. You will be taken to the next sign-up page. 5. Create a Saqina ID. This will be your Saqina login ID and cannot be changed, so think of one that is secure and easy to remember. 6. Create a Saqina password. You can change your password at any time. 7. Enter your Password Reset Question and Answer. This can be used at a later time if you forget your password. 8. Enter your e-mail address. You will receive e-mail notification when new information is available in [address] 9. Click Sign Up. You can now view and download portions of your medical record. 10. Click the Download Summary menu link to download a portable copy of your medical information. If you have questions, please visit the Frequently Asked Questions section of the Saqina website. Remember, Saqina is NOT to be used for urgent needs. For medical emergencies, dial 911. Now available from your iPhone and Android! Please provide this summary of care documentation to your next provider. Your primary care clinician is listed as [address]. If you have any questions after today's visit, please call [phone].

## 2018-06-08 ENCOUNTER — OFFICE VISIT (OUTPATIENT)
Dept: FAMILY MEDICINE CLINIC | Age: 73
End: 2018-06-08

## 2018-06-08 VITALS
DIASTOLIC BLOOD PRESSURE: 58 MMHG | RESPIRATION RATE: 16 BRPM | WEIGHT: 198.8 LBS | HEIGHT: 67 IN | BODY MASS INDEX: 31.2 KG/M2 | TEMPERATURE: 98 F | HEART RATE: 59 BPM | OXYGEN SATURATION: 97 % | SYSTOLIC BLOOD PRESSURE: 122 MMHG

## 2018-06-08 DIAGNOSIS — L03.818 CELLULITIS OF OTHER SPECIFIED SITE: Primary | ICD-10-CM

## 2018-06-08 RX ORDER — CEPHALEXIN 500 MG/1
500 CAPSULE ORAL 4 TIMES DAILY
Qty: 20 CAP | Refills: 0 | Status: SHIPPED | OUTPATIENT
Start: 2018-06-08 | End: 2018-06-13

## 2018-06-08 RX ORDER — SULFAMETHOXAZOLE AND TRIMETHOPRIM 800; 160 MG/1; MG/1
1 TABLET ORAL 2 TIMES DAILY
Qty: 20 TAB | Refills: 0 | Status: SHIPPED | OUTPATIENT
Start: 2018-06-08 | End: 2018-06-18

## 2018-06-08 NOTE — PROGRESS NOTES
Chief Complaint   Patient presents with    Follow Up Chronic Condition     Cellulitis left breast     1. Have you been to the ER, urgent care clinic since your last visit? Hospitalized since your last visit? No    2. Have you seen or consulted any other health care providers outside of the [address] [place] [address] since your last visit? Include any pap smears or colon screening.  No

## 2018-06-08 NOTE — PROGRESS NOTES
Clotilde Parish  74 y.o. female  1945  [address] [facility]  [address] [address]  [id number]     [facility]: Progress Note       Encounter Date: 6/8/2018    Chief Complaint   Patient presents with    Follow Up Chronic Condition     Cellulitis left breast     History of Present Illness   Clotilde Parish is a 74 y.o. female who presents to clinic today for: 1 week f/u from left breast cellulitis. She states the area continues to have a \"burning pain\" and some drainage as noted on her bra. She denies any fevers, breast skin changes, odor, decreased ROM with her left arm, nipple drainage. She states the area does not itch. See media section for picture. Health Maintenance    Health Maintenance Due   Topic Date Due    GLAUCOMA SCREENING Q2Y  07/06/2018     Review of Systems   Review of Systems   Constitutional: Negative. HENT: Negative. Eyes: Negative. Respiratory: Negative. Cardiovascular: Negative. Gastrointestinal: Negative. Genitourinary: Negative. Musculoskeletal: Negative. Skin: Negative. Neurological: Negative. Endo/Heme/Allergies: Negative. Psychiatric/Behavioral: Negative. Vitals/Objective:     Vitals:    06/08/18 0904   BP: 122/58   Pulse: (!) 59   Resp: 16   Temp: 98 °F (36.7 °C)   TempSrc: Oral   SpO2: 97%   Weight: 198 lb 12.8 oz (90.2 kg)   Height: 5' 6.5\" (1.689 m)     Body mass index is 31.61 kg/(m^2). Physical Exam   Constitutional: She appears well-developed and well-nourished. She is active and cooperative. Pulmonary/Chest: Left breast exhibits no inverted nipple, no mass, no nipple discharge, no skin change and no tenderness. Neurological: She is alert. Skin: Skin is warm, dry and intact. Psychiatric: She has a normal mood and affect.  Her speech is normal and behavior is normal. Judgment and thought content normal. Cognition and memory are normal.       No results found for this or any previous visit (from the past 24 hour(s)). Assessment and Plan:   1. Cellulitis of other specified site    - trimethoprim-sulfamethoxazole (BACTRIM DS, SEPTRA DS) 160-800 mg per tablet; Take 1 Tab by mouth two (2) times a day for 10 days. Dispense: 20 Tab; Refill: 0    cephALEXin (KEFLEX) 500 mg capsule [[id number]]  Order Details     Dose: 500 mg Route: Oral Frequency: 4 TIMES DAILY   Indications of Use: Skin and Skin Structure Infection   Dispense Quantity:  20 Cap Refills:  0 Fills Remaining:  --           Sig: Take 1 Cap by mouth four (4) times daily for 5 days. Indications: Skin and Skin Structure Infection          Advised patient to f/u in 7 days. If not better, will consider a ultrasound. I have discussed the diagnosis with the patient and the intended plan as seen in the above orders. she has expressed understanding. The patient has received an after-visit summary and questions were answered concerning future plans. I have discussed medication side effects and warnings with the patient as well. Electronically Signed: Lowell Morel NP     History/Allergies   Patients past medical, surgical and family histories were reviewed and updated. Past Medical History:   Diagnosis Date    Asthma 4/5/2010    Bronchitis, mucopurulent recurrent ([address] 5/14/2010    [address]A (cerebral vascular accident) [address] 4/5/2010    Hypercholesteremia 4/5/2010      Past Surgical History:   Procedure Laterality Date    HX HYSTERECTOMY      HX TONSILLECTOMY       Family History   Problem Relation Age of Onset    Stroke Mother     Heart Disease Father      Social History     Social History    Marital status:      Spouse name: N/A    Number of children: N/A    Years of education: N/A     Occupational History    Not on file.      Social History Main Topics    Smoking status: Never Smoker    Smokeless tobacco: Never Used    Alcohol use No    Drug use: No    Sexual activity: Not on file     Other Topics Concern    Not on file Social History Narrative         No Known Allergies    Disposition     Follow-up Disposition:  Return in about 7 days (around 6/15/2018). Future Appointments  Date Time Provider Jero Glez   [facility]2018 [facility]   10/31/2018 9:35 AM Ami Jordan [facility] SCHED            Current Medications after this visit     Current Outpatient Prescriptions   Medication Sig    cephALEXin (KEFLEX) 500 mg capsule Take 1 Cap by mouth four (4) times daily for 5 days. Indications: Skin and Skin Structure Infection    trimethoprim-sulfamethoxazole (BACTRIM DS, SEPTRA DS) 160-800 mg per tablet Take 1 Tab by mouth two (2) times a day for 10 days.  doxycycline (ADOXA) 100 mg tablet Take 1 Tab by mouth two (2) times a day for 7 days.  omeprazole (PRILOSEC) 20 mg capsule TAKE ONE CAPSULE BY MOUTH EVERY DAY    atorvastatin (LIPITOR) 40 mg tablet TAKE ONE TABLET BY MOUTH EVERY DAY *STOP NIACIN*    omega-3 fatty acids-vitamin e (FISH OIL) 1,000 mg cap Take 1 Cap by mouth two (2) times a day.  aspirin delayed-release 81 mg tablet Take 81 mg by mouth daily.  CALCIUM CARBONATE (ISAI-600 PO) Take  by mouth.  Cholecalciferol, Vitamin D3, (VITAMIN D) 2,000 unit Cap Take  by mouth.  VITAMIN B COMPLEX (VITAMIN B-100 COMPLEX PO) Take  by mouth. No current facility-administered medications for this visit. There are no discontinued medications.

## 2018-06-08 NOTE — MR AVS SNAPSHOT
[address] [facility] 
 
 
 [address] [id number] [phone] Patient: Jessenia Anderson MRN: [id number] CXI:[id number] Visit Information Date & Time Provider Department Dept. Phone Encounter #  
 6/8/2018  9:20 AM Eduardo Clemons  [facility] [phone] [id number] Follow-up Instructions Return in about 7 days (around 6/15/2018). Your Appointments 10/31/2018  9:35 AM  
ROUTINE CARE with Tee Paredes MD  
[address] [facility]) Appt Note: 6 mo f/u-Hypercholesteremia (  
 [address] [address]  
[place] [address] [address] Upcoming Health Maintenance Date Due  
 GLAUCOMA SCREENING Q2Y 7/6/2018 Influenza Age 5 to Adult 8/1/2018 BREAST CANCER SCRN MAMMOGRAM 10/30/2018 MEDICARE YEARLY EXAM 2/6/2019 Bone Densitometry 10/31/2019 DTaP/Tdap/Td series (2 - Td) 7/13/2021 COLONOSCOPY 3/17/2027 Allergies as of 6/8/2018  Review Complete On: 6/8/2018 By: Bridgette Bernstein LPN No Known Allergies Current Immunizations  Reviewed on 6/6/2016 Name Date H1N1 FLU VACCINE 11/25/2009 Influenza High Dose Vaccine PF 9/11/2017 Influenza Vaccine 10/13/2015, 10/3/2013 Influenza Vaccine (Quad) PF 12/13/2016, 11/20/2014 Influenza Vaccine Split 10/8/2012, 12/15/2011 Influenza Vaccine Whole 11/3/2010, 10/8/2009 Pneumococcal Conjugate (PCV-13) 11/10/2015 Pneumococcal Vaccine (Pcv) 10/1/2006 TDAP Vaccine 7/13/2011 ZZZ-RETIRED (DO NOT USE) Pneumococcal Vaccine (Unspecified Type) 2/21/2011 Zoster Vaccine, Live 1/21/2013 Not reviewed this visit You Were Diagnosed With   
  
 Codes Comments Cellulitis of other specified site    -  Primary ICD-10-CM: L03.818 ICD-9-CM: 682.8 Vitals  BP Pulse Temp Resp Height(growth percentile) Weight(growth percentile) 122/58 (BP 1 Location: Left arm, BP Patient Position: Sitting) (!) 59 98 °F (36.7 °C) (Oral) 16 5' 6.5\" (1.689 m) 198 lb 12.8 oz (90.2 kg) SpO2 BMI OB Status Smoking Status 97% 31.61 kg/m2 Hysterectomy Never Smoker Vitals History BMI and BSA Data Body Mass Index Body Surface Area  
 31.61 kg/m 2 2.06 m 2 Preferred Pharmacy Pharmacy Name Phone [address] [facility] [phone] Your Updated Medication List  
  
   
This list is accurate as of 6/8/18  9:45 AM.  Always use your most recent med list.  
  
  
  
  
 aspirin delayed-release 81 mg tablet Take 81 mg by mouth daily. atorvastatin 40 mg tablet Commonly known as:  LIPITOR  
TAKE ONE TABLET BY MOUTH EVERY DAY *STOP NIACIN* ISAI-600 PO Take  by mouth. cephALEXin 500 mg capsule Commonly known as:  Gabriella Gupta Take 1 Cap by mouth four (4) times daily for 5 days. Indications: Skin and Skin Structure Infection  
  
 doxycycline 100 mg tablet Commonly known as:  ADOXA Take 1 Tab by mouth two (2) times a day for 7 days. FISH OIL 1,000 mg Cap Generic drug:  omega-3 fatty acids-vitamin e Take 1 Cap by mouth two (2) times a day. omeprazole 20 mg capsule Commonly known as:  PRILOSEC  
TAKE ONE CAPSULE BY MOUTH EVERY DAY  
  
 trimethoprim-sulfamethoxazole 160-800 mg per tablet Commonly known as:  BACTRIM DS, SEPTRA DS Take 1 Tab by mouth two (2) times a day for 10 days. VITAMIN B-100 COMPLEX PO Take  by mouth. VITAMIN D 2,000 unit Cap capsule Generic drug:  Cholecalciferol (Vitamin D3) Take  by mouth. Prescriptions Sent to Pharmacy Refills  
 cephALEXin (KEFLEX) 500 mg capsule 0 Sig: Take 1 Cap by mouth four (4) times daily for 5 days. Indications: Skin and Skin Structure Infection Class: Normal  
 Pharmacy: [facility]  #: [phone] Route: Oral  
 trimethoprim-sulfamethoxazole (BACTRIM DS, SEPTRA DS) 160-800 mg per tablet 0 Sig: Take 1 Tab by mouth two (2) times a day for 10 days. Class: Normal  
 Pharmacy: [address] [facility] #: [phone] Route: Oral  
  
Follow-up Instructions Return in about 7 days (around 6/15/2018). Patient Instructions Cellulitis: Care Instructions Your Care Instructions Cellulitis is a skin infection. It often occurs after a break in the skin from a scrape, cut, bite, or puncture, or after a rash. The doctor has checked you carefully, but problems can develop later. If you notice any problems or new symptoms, get medical treatment right away. Follow-up care is a key part of your treatment and safety. Be sure to make and go to all appointments, and call your doctor if you are having problems. It's also a good idea to know your test results and keep a list of the medicines you take. How can you care for yourself at home? · Take your antibiotics as directed. Do not stop taking them just because you feel better. You need to take the full course of antibiotics. · Prop up the infected area on pillows to reduce pain and swelling. Try to keep the area above the level of your heart as often as you can. · If your doctor told you how to care for your wound, follow your doctor's instructions. If you did not get instructions, follow this general advice: ¨ Wash the wound with clean water 2 times a day. Don't use hydrogen peroxide or alcohol, which can slow healing. ¨ You may cover the wound with a thin layer of petroleum jelly, such as Vaseline, and a nonstick bandage. ¨ Apply more petroleum jelly and replace the bandage as needed. · Be safe with medicines. Take pain medicines exactly as directed. ¨ If the doctor gave you a prescription medicine for pain, take it as prescribed.  
¨ If you are not taking a prescription pain medicine, ask your doctor if you can take an over-the-counter medicine. To prevent cellulitis in the future · Try to prevent cuts, scrapes, or other injuries to your skin. Cellulitis most often occurs where there is a break in the skin. · If you get a scrape, cut, mild burn, or bite, wash the wound with clean water as soon as you can to help avoid infection. Don't use hydrogen peroxide or alcohol, which can slow healing. · If you have swelling in your legs (edema), support stockings and good skin care may help prevent leg sores and cellulitis. · Take care of your feet, especially if you have diabetes or other conditions that increase the risk of infection. Wear shoes and socks. Do not go barefoot. If you have athlete's foot or other skin problems on your feet, talk to your doctor about how to treat them. When should you call for help? Call your doctor now or seek immediate medical care if: 
? · You have signs that your infection is getting worse, such as: 
¨ Increased pain, swelling, warmth, or redness. ¨ Red streaks leading from the area. ¨ Pus draining from the area. ¨ A fever. ? · You get a rash. ? Watch closely for changes in your health, and be sure to contact your doctor if: 
? · You are not getting better after 1 day (24 hours). ? · You do not get better as expected. Where can you learn more? Go to http://chuck-hank.info/. Sera Yu in the search box to learn more about \"Cellulitis: Care Instructions. \" Current as of: October 13, 2016 Content Version: 11.4 © 6573-8195 OkCopay. Care instructions adapted under license by interclick (which disclaims liability or warranty for this information). If you have questions about a medical condition or this instruction, always ask your healthcare professional. [place] [address] any warranty or liability for your use of this information. Introducing \[facility] [place] [facility]\"" & HEALTH SERVICES!    
 [facility] introduces [employer] patient portal. Now you can access parts of your medical record, email your doctor's office, and request medication refills online. 1. In your internet browser, go to [email or web address] 2. Click on the First Time User? Click Here link in the Sign In box. You will see the New Member Sign Up page. 3. Enter your [employer] Access Code exactly as it appears below. You will not need to use this code after youve completed the sign-up process. If you do not sign up before the expiration date, you must request a new code. · [employer] Access Code: [email or web address] Expires: 8/30/2018 10:32 AM 
 
4. Enter the last four digits of your Social Security Number (xxxx) and Date of Birth (mm/dd/yyyy) as indicated and click Submit. You will be taken to the next sign-up page. 5. Create a [employer] ID. This will be your [employer] login ID and cannot be changed, so think of one that is secure and easy to remember. 6. Create a [employer] password. You can change your password at any time. 7. Enter your Password Reset Question and Answer. This can be used at a later time if you forget your password. 8. Enter your e-mail address. You will receive e-mail notification when new information is available in [address] 9. Click Sign Up. You can now view and download portions of your medical record. 10. Click the Download Summary menu link to download a portable copy of your medical information. If you have questions, please visit the Frequently Asked Questions section of the [employer] website. Remember, [employer] is NOT to be used for urgent needs. For medical emergencies, dial 911. Now available from your iPhone and Android! Please provide this summary of care documentation to your next provider. Your primary care clinician is listed as [address]. If you have any questions after today's visit, please call [phone].

## 2018-06-08 NOTE — PATIENT INSTRUCTIONS
Cellulitis: Care Instructions  Your Care Instructions    Cellulitis is a skin infection. It often occurs after a break in the skin from a scrape, cut, bite, or puncture, or after a rash. The doctor has checked you carefully, but problems can develop later. If you notice any problems or new symptoms, get medical treatment right away. Follow-up care is a key part of your treatment and safety. Be sure to make and go to all appointments, and call your doctor if you are having problems. It's also a good idea to know your test results and keep a list of the medicines you take. How can you care for yourself at home? · Take your antibiotics as directed. Do not stop taking them just because you feel better. You need to take the full course of antibiotics. · Prop up the infected area on pillows to reduce pain and swelling. Try to keep the area above the level of your heart as often as you can. · If your doctor told you how to care for your wound, follow your doctor's instructions. If you did not get instructions, follow this general advice:  ¨ Wash the wound with clean water 2 times a day. Don't use hydrogen peroxide or alcohol, which can slow healing. ¨ You may cover the wound with a thin layer of petroleum jelly, such as Vaseline, and a nonstick bandage. ¨ Apply more petroleum jelly and replace the bandage as needed. · Be safe with medicines. Take pain medicines exactly as directed. ¨ If the doctor gave you a prescription medicine for pain, take it as prescribed. ¨ If you are not taking a prescription pain medicine, ask your doctor if you can take an over-the-counter medicine. To prevent cellulitis in the future  · Try to prevent cuts, scrapes, or other injuries to your skin. Cellulitis most often occurs where there is a break in the skin. · If you get a scrape, cut, mild burn, or bite, wash the wound with clean water as soon as you can to help avoid infection.  Don't use hydrogen peroxide or alcohol, which can slow healing. · If you have swelling in your legs (edema), support stockings and good skin care may help prevent leg sores and cellulitis. · Take care of your feet, especially if you have diabetes or other conditions that increase the risk of infection. Wear shoes and socks. Do not go barefoot. If you have athlete's foot or other skin problems on your feet, talk to your doctor about how to treat them. When should you call for help? Call your doctor now or seek immediate medical care if:  ? · You have signs that your infection is getting worse, such as:  ¨ Increased pain, swelling, warmth, or redness. ¨ Red streaks leading from the area. ¨ Pus draining from the area. ¨ A fever. ? · You get a rash. ? Watch closely for changes in your health, and be sure to contact your doctor if:  ? · You are not getting better after 1 day (24 hours). ? · You do not get better as expected. Where can you learn more? Go to http://chuck-hank.info/. Bryan Donahue in the search box to learn more about \"Cellulitis: Care Instructions. \"  Current as of: October 13, 2016  Content Version: 11.4  © 2230-9171 HistoPathway. Care instructions adapted under license by Agentek (which disclaims liability or warranty for this information). If you have questions about a medical condition or this instruction, always ask your healthcare professional. [place] [address] any warranty or liability for your use of this information.

## 2018-06-15 ENCOUNTER — OFFICE VISIT (OUTPATIENT)
Dept: FAMILY MEDICINE CLINIC | Age: 73
End: 2018-06-15

## 2018-06-15 VITALS
SYSTOLIC BLOOD PRESSURE: 112 MMHG | HEART RATE: 68 BPM | RESPIRATION RATE: 18 BRPM | HEIGHT: 67 IN | BODY MASS INDEX: 30.61 KG/M2 | OXYGEN SATURATION: 96 % | WEIGHT: 195 LBS | DIASTOLIC BLOOD PRESSURE: 53 MMHG | TEMPERATURE: 98.2 F

## 2018-06-15 DIAGNOSIS — L03.818 CELLULITIS OF OTHER SPECIFIED SITE: Primary | ICD-10-CM

## 2018-06-15 NOTE — PROGRESS NOTES
Adry Ndiaye  74 y.o. female  1945  [address] [facility]  [address] [address]  [id number]     [facility]: Progress Note       Encounter Date: 6/15/2018    Chief Complaint   Patient presents with    Follow-up     Left breast     History of Present Illness   Adry Ndiaye is a 74 y.o. female who presents to clinic today for:  F/u left breast cellulitis. No CC. Patient denies fever, pain/tenderness, change in breast skin/nipple. Full ROM of left arm. The \"burning\" sensation has completely subsided. Patient compliant with antibiotics. Health Maintenance    Health Maintenance Due   Topic Date Due    GLAUCOMA SCREENING Q2Y  07/06/2018     Review of Systems   Review of Systems   Constitutional: Negative. All other systems reviewed and are negative. Vitals/Objective:     Vitals:    06/15/18 0926   BP: 112/53   Pulse: 68   Resp: 18   Temp: 98.2 °F (36.8 °C)   TempSrc: Oral   SpO2: 96%   Weight: 195 lb (88.5 kg)   Height: 5' 6.5\" (1.689 m)     Body mass index is 31 kg/(m^2). Physical Exam   Pulmonary/Chest: Left breast exhibits no inverted nipple, no mass, no nipple discharge, no skin change and no tenderness. Skin: Skin is warm, dry and intact. Psychiatric: She has a normal mood and affect. Her speech is normal and behavior is normal. Judgment and thought content normal. Cognition and memory are normal.       No results found for this or any previous visit (from the past 24 hour(s)). Assessment and Plan:   1. Cellulitis of other specified site  Improved healing. Advised patient to continue the full 10 day course of Bactrim. I have discussed the diagnosis with the patient and the intended plan as seen in the above orders. she has expressed understanding. The patient has received an after-visit summary and questions were answered concerning future plans. I have discussed medication side effects and warnings with the patient as well.     Electronically Signed: Richi Johns Dima Longoria NP     History/Allergies   Patients past medical, surgical and family histories were reviewed and updated. Past Medical History:   Diagnosis Date    Asthma 4/5/2010    Bronchitis, mucopurulent recurrent ([facility] 5/14/2010    CVA (cerebral vascular accident) ([facility] 4/5/2010    Hypercholesteremia 4/5/2010      Past Surgical History:   Procedure Laterality Date    HX HYSTERECTOMY      HX TONSILLECTOMY       Family History   Problem Relation Age of Onset    Stroke Mother     Heart Disease Father      Social History     Social History    Marital status:      Spouse name: N/A    Number of children: N/A    Years of education: N/A     Occupational History    Not on file. Social History Main Topics    Smoking status: Never Smoker    Smokeless tobacco: Never Used    Alcohol use No    Drug use: No    Sexual activity: Not on file     Other Topics Concern    Not on file     Social History Narrative         No Known Allergies    Disposition     Follow-up Disposition:  Return if symptoms worsen or fail to improve. Future Appointments  Date Time Provider Jero Glez   10/31/2018 9:35 AM Star Colon MD [facility] SCHED            Current Medications after this visit     Current Outpatient Prescriptions   Medication Sig    trimethoprim-sulfamethoxazole (BACTRIM DS, SEPTRA DS) 160-800 mg per tablet Take 1 Tab by mouth two (2) times a day for 10 days.  omeprazole (PRILOSEC) 20 mg capsule TAKE ONE CAPSULE BY MOUTH EVERY DAY    atorvastatin (LIPITOR) 40 mg tablet TAKE ONE TABLET BY MOUTH EVERY DAY *STOP NIACIN*    omega-3 fatty acids-vitamin e (FISH OIL) 1,000 mg cap Take 1 Cap by mouth two (2) times a day.  aspirin delayed-release 81 mg tablet Take 81 mg by mouth daily.  CALCIUM CARBONATE (ISAI-600 PO) Take  by mouth.  Cholecalciferol, Vitamin D3, (VITAMIN D) 2,000 unit Cap Take  by mouth.  VITAMIN B COMPLEX (VITAMIN B-100 COMPLEX PO) Take  by mouth.      No current facility-administered medications for this visit. There are no discontinued medications.

## 2018-06-15 NOTE — PROGRESS NOTES
Chief Complaint   Patient presents with    Follow-up     Left breast     1. Have you been to the ER, urgent care clinic since your last visit? Hospitalized since your last visit? No    2. Have you seen or consulted any other health care providers outside of the [address] [place] [address] since your last visit? Include any pap smears or colon screening.  No

## 2018-06-15 NOTE — PATIENT INSTRUCTIONS
Cellulitis: Care Instructions  Your Care Instructions    Cellulitis is a skin infection. It often occurs after a break in the skin from a scrape, cut, bite, or puncture, or after a rash. The doctor has checked you carefully, but problems can develop later. If you notice any problems or new symptoms, get medical treatment right away. Follow-up care is a key part of your treatment and safety. Be sure to make and go to all appointments, and call your doctor if you are having problems. It's also a good idea to know your test results and keep a list of the medicines you take. How can you care for yourself at home? · Take your antibiotics as directed. Do not stop taking them just because you feel better. You need to take the full course of antibiotics. · Prop up the infected area on pillows to reduce pain and swelling. Try to keep the area above the level of your heart as often as you can. · If your doctor told you how to care for your wound, follow your doctor's instructions. If you did not get instructions, follow this general advice:  ¨ Wash the wound with clean water 2 times a day. Don't use hydrogen peroxide or alcohol, which can slow healing. ¨ You may cover the wound with a thin layer of petroleum jelly, such as Vaseline, and a nonstick bandage. ¨ Apply more petroleum jelly and replace the bandage as needed. · Be safe with medicines. Take pain medicines exactly as directed. ¨ If the doctor gave you a prescription medicine for pain, take it as prescribed. ¨ If you are not taking a prescription pain medicine, ask your doctor if you can take an over-the-counter medicine. To prevent cellulitis in the future  · Try to prevent cuts, scrapes, or other injuries to your skin. Cellulitis most often occurs where there is a break in the skin. · If you get a scrape, cut, mild burn, or bite, wash the wound with clean water as soon as you can to help avoid infection.  Don't use hydrogen peroxide or alcohol, which can slow healing. · If you have swelling in your legs (edema), support stockings and good skin care may help prevent leg sores and cellulitis. · Take care of your feet, especially if you have diabetes or other conditions that increase the risk of infection. Wear shoes and socks. Do not go barefoot. If you have athlete's foot or other skin problems on your feet, talk to your doctor about how to treat them. When should you call for help? Call your doctor now or seek immediate medical care if:  ? · You have signs that your infection is getting worse, such as:  ¨ Increased pain, swelling, warmth, or redness. ¨ Red streaks leading from the area. ¨ Pus draining from the area. ¨ A fever. ? · You get a rash. ? Watch closely for changes in your health, and be sure to contact your doctor if:  ? · You are not getting better after 1 day (24 hours). ? · You do not get better as expected. Where can you learn more? Go to http://chuck-hank.info/. Merline Foil in the search box to learn more about \"Cellulitis: Care Instructions. \"  Current as of: October 13, 2016  Content Version: 11.4  © 4194-9671 eShakti.com. Care instructions adapted under license by Happyshop (which disclaims liability or warranty for this information). If you have questions about a medical condition or this instruction, always ask your healthcare professional. [place] [address] any warranty or liability for your use of this information.

## 2018-08-14 ENCOUNTER — OFFICE VISIT (OUTPATIENT)
Dept: FAMILY MEDICINE CLINIC | Age: 73
End: 2018-08-14

## 2018-08-14 ENCOUNTER — PATIENT OUTREACH (OUTPATIENT)
Dept: FAMILY MEDICINE CLINIC | Age: 73
End: 2018-08-14

## 2018-08-14 VITALS
OXYGEN SATURATION: 98 % | DIASTOLIC BLOOD PRESSURE: 58 MMHG | BODY MASS INDEX: 30.73 KG/M2 | RESPIRATION RATE: 18 BRPM | WEIGHT: 195.8 LBS | TEMPERATURE: 98.3 F | HEART RATE: 68 BPM | SYSTOLIC BLOOD PRESSURE: 113 MMHG | HEIGHT: 67 IN

## 2018-08-14 DIAGNOSIS — L03.90 MRSA CELLULITIS: ICD-10-CM

## 2018-08-14 DIAGNOSIS — B95.62 MRSA CELLULITIS: ICD-10-CM

## 2018-08-14 DIAGNOSIS — L03.818 CELLULITIS OF OTHER SPECIFIED SITE: Primary | ICD-10-CM

## 2018-08-14 RX ORDER — CEPHALEXIN 500 MG/1
CAPSULE ORAL
Refills: 0 | COMMUNITY
Start: 2018-08-12 | End: 2018-12-05

## 2018-08-14 RX ORDER — SULFAMETHOXAZOLE AND TRIMETHOPRIM 400; 80 MG/1; MG/1
TABLET ORAL
Refills: 0 | COMMUNITY
Start: 2018-08-12 | End: 2018-10-31 | Stop reason: SDUPTHER

## 2018-08-14 NOTE — MR AVS SNAPSHOT
Nikki Sloan 
 
 
 2007 A [facility] [address] [address] [phone] Patient: Umang Terrazas MRN: [id number] PKI:[id number] Visit Information Date & Time Provider Department Dept. Phone Encounter #  
 8/14/2018  9:50 AM Andreea Dejesus, AUGUSTUS [facility] [phone] [id number] Follow-up Instructions Return in about 3 days (around 8/17/2018) for cellulitis. Your Appointments 10/31/2018  9:35 AM  
ROUTINE CARE with Amirah Thorne MD  
[facility] Appt Note: 6 mo f/u-Hypercholesteremia (  
 [address] [place] [id number] [address]  
[place] [address] [place] [id number] Upcoming Health Maintenance Date Due  
 GLAUCOMA SCREENING Q2Y 7/6/2018 Influenza Age 5 to Adult 8/1/2018 BREAST CANCER SCRN MAMMOGRAM 10/30/2018 MEDICARE YEARLY EXAM 2/6/2019 Bone Densitometry 10/31/2019 DTaP/Tdap/Td series (2 - Td) 7/13/2021 COLONOSCOPY 3/17/2027 Allergies as of 8/14/2018  Review Complete On: 8/14/2018 By: Leta Zarco LPN No Known Allergies Current Immunizations  Reviewed on 6/6/2016 Name Date H1N1 FLU VACCINE 11/25/2009 Influenza High Dose Vaccine PF 9/11/2017 Influenza Vaccine 10/13/2015, 10/3/2013 Influenza Vaccine (Quad) PF 12/13/2016, 11/20/2014 Influenza Vaccine Split 10/8/2012, 12/15/2011 Influenza Vaccine Whole 11/3/2010, 10/8/2009 Pneumococcal Conjugate (PCV-13) 11/10/2015 Pneumococcal Vaccine (Pcv) 10/1/2006 TDAP Vaccine 7/13/2011 ZZZ-RETIRED (DO NOT USE) Pneumococcal Vaccine (Unspecified Type) 2/21/2011 Zoster Vaccine, Live 1/21/2013 Not reviewed this visit You Were Diagnosed With   
  
 Codes Comments Cellulitis of other specified site    -  Primary ICD-10-CM: L03.818 ICD-9-CM: 682.8 Vitals  BP Pulse Temp Resp Height(growth percentile) Weight(growth percentile) 113/58 (BP 1 Location: Left arm, BP Patient Position: Sitting) 68 98.3 °F (36.8 °C) (Oral) 18 5' 6.5\" (1.689 m) 195 lb 12.8 oz (88.8 kg) SpO2 BMI OB Status Smoking Status 98% 31.13 kg/m2 Hysterectomy Never Smoker Vitals History BMI and BSA Data Body Mass Index Body Surface Area  
 31.13 kg/m 2 2.04 m 2 Preferred Pharmacy Pharmacy Name Phone [facility] [place] [address] [facility] [phone] Your Updated Medication List  
  
   
This list is accurate as of 8/14/18 10:35 AM.  Always use your most recent med list.  
  
  
  
  
 aspirin delayed-release 81 mg tablet Take 81 mg by mouth daily. atorvastatin 40 mg tablet Commonly known as:  LIPITOR  
TAKE ONE TABLET BY MOUTH EVERY DAY *STOP NIACIN* ISAI-600 PO Take  by mouth. cephALEXin 500 mg capsule Commonly known as:  Megan Whitlock TK 1 C PO Q 6 H  
  
 FISH OIL 1,000 mg Cap Generic drug:  omega-3 fatty acids-vitamin e Take 1 Cap by mouth two (2) times a day. omeprazole 20 mg capsule Commonly known as:  PRILOSEC  
TAKE ONE CAPSULE BY MOUTH EVERY DAY  
  
 trimethoprim-sulfamethoxazole  mg per tablet Commonly known as:  Vernon Gagnon TK 2 TS PO Q 12 H UNTIL GONE  
  
 VITAMIN B-100 COMPLEX PO Take  by mouth. VITAMIN D 2,000 unit Cap capsule Generic drug:  Cholecalciferol (Vitamin D3) Take  by mouth. Follow-up Instructions Return in about 3 days (around 8/17/2018) for cellulitis. Patient Instructions Cellulitis: Care Instructions Your Care Instructions Cellulitis is a skin infection caused by bacteria, most often strep or staph. It often occurs after a break in the skin from a scrape, cut, bite, or puncture, or after a rash. Cellulitis may be treated without doing tests to find out what caused it.  But your doctor may do tests, if needed, to look for a specific bacteria, like methicillin-resistant Staphylococcus aureus (MRSA). The doctor has checked you carefully, but problems can develop later. If you notice any problems or new symptoms, get medical treatment right away. Follow-up care is a key part of your treatment and safety. Be sure to make and go to all appointments, and call your doctor if you are having problems. It's also a good idea to know your test results and keep a list of the medicines you take. How can you care for yourself at home? · Take your antibiotics as directed. Do not stop taking them just because you feel better. You need to take the full course of antibiotics. · Prop up the infected area on pillows to reduce pain and swelling. Try to keep the area above the level of your heart as often as you can. · If your doctor told you how to care for your wound, follow your doctor's instructions. If you did not get instructions, follow this general advice: ¨ Wash the wound with clean water 2 times a day. Don't use hydrogen peroxide or alcohol, which can slow healing. ¨ You may cover the wound with a thin layer of petroleum jelly, such as Vaseline, and a nonstick bandage. ¨ Apply more petroleum jelly and replace the bandage as needed. · Be safe with medicines. Take pain medicines exactly as directed. ¨ If the doctor gave you a prescription medicine for pain, take it as prescribed. ¨ If you are not taking a prescription pain medicine, ask your doctor if you can take an over-the-counter medicine. To prevent cellulitis in the future · Try to prevent cuts, scrapes, or other injuries to your skin. Cellulitis most often occurs where there is a break in the skin. · If you get a scrape, cut, mild burn, or bite, wash the wound with clean water as soon as you can to help avoid infection. Don't use hydrogen peroxide or alcohol, which can slow healing.  
· If you have swelling in your legs (edema), support stockings and good skin care may help prevent leg sores and cellulitis. · Take care of your feet, especially if you have diabetes or other conditions that increase the risk of infection. Wear shoes and socks. Do not go barefoot. If you have athlete's foot or other skin problems on your feet, talk to your doctor about how to treat them. When should you call for help? Call your doctor now or seek immediate medical care if: 
  · You have signs that your infection is getting worse, such as: 
¨ Increased pain, swelling, warmth, or redness. ¨ Red streaks leading from the area. ¨ Pus draining from the area. ¨ A fever.  
  · You get a rash.  
 Watch closely for changes in your health, and be sure to contact your doctor if: 
  · You do not get better as expected. Where can you learn more? Go to http://chuck-hank.info/. Christopher Shoemaker in the search box to learn more about \"Cellulitis: Care Instructions. \" Current as of: May 10, 2017 Content Version: 11.7 © 2083-3225 [employer]. Care instructions adapted under license by [employer] (which disclaims liability or warranty for this information). If you have questions about a medical condition or this instruction, always ask your healthcare professional. [place] [address] any warranty or liability for your use of this information. Introducing [facility]! Reba Mulligan introduces [employer] patient portal. Now you can access parts of your medical record, email your doctor's office, and request medication refills online. 1. In your internet browser, go to [email or web address] 2. Click on the First Time User? Click Here link in the Sign In box. You will see the New Member Sign Up page. 3. Enter your [employer] Access Code exactly as it appears below. You will not need to use this code after youve completed the sign-up process. If you do not sign up before the expiration date, you must request a new code.  
 
· [employer] Access Code: [email or web address] Expires: 8/30/2018 10:32 AM 
 
4. Enter the last four digits of your Social Security Number (xxxx) and Date of Birth (mm/dd/yyyy) as indicated and click Submit. You will be taken to the next sign-up page. 5. Create a irisnote ID. This will be your irisnote login ID and cannot be changed, so think of one that is secure and easy to remember. 6. Create a irisnote password. You can change your password at any time. 7. Enter your Password Reset Question and Answer. This can be used at a later time if you forget your password. 8. Enter your e-mail address. You will receive e-mail notification when new information is available in [address] 9. Click Sign Up. You can now view and download portions of your medical record. 10. Click the Download Summary menu link to download a portable copy of your medical information. If you have questions, please visit the Frequently Asked Questions section of the irisnote website. Remember, irisnote is NOT to be used for urgent needs. For medical emergencies, dial 911. Now available from your iPhone and Android! Please provide this summary of care documentation to your next provider. Your primary care clinician is listed as [address]. If you have any questions after today's visit, please call [phone].

## 2018-08-14 NOTE — PROGRESS NOTES
Jadon Galvez  74 y.o. female  1945  [address] [place] [id number]  [phone]     [facility]: Progress Note       Encounter Date: 8/14/2018    Chief Complaint   Patient presents with   [facility] Follow Up     History of Present Illness   Jadon Galvez is a 74 y.o. female who presents to clinic today for:  1 week ago-noted a \"bump\" on her right inner thigh. She states it got irritated with her clothing and sitting. She applied a bandaid and neosporin. Over the weekend she didn't feel well and took tylenol. By Sunday morning the area grew bigger and erythematous. She denies odor and fevers. [facility] went to [facility] on Sunday they told her to go to the ED. She went to [facility]-the provider I&D and ordered po abx. She was advised to f/u with her PCP. Health Maintenance    Health Maintenance Due   Topic Date Due    GLAUCOMA SCREENING Q2Y  07/06/2018    Influenza Age 5 to Adult  08/01/2018    BREAST CANCER SCRN MAMMOGRAM  10/30/2018     Review of Systems   Review of Systems   Constitutional: Negative. HENT: Negative. Eyes: Negative. Respiratory: Negative. Cardiovascular: Negative. Gastrointestinal: Negative. Genitourinary: Negative. Musculoskeletal: Negative. Skin: Negative. Neurological: Negative. Endo/Heme/Allergies: Negative. Psychiatric/Behavioral: Negative. Vitals/Objective:     Vitals:    08/14/18 0955   BP: 113/58   Pulse: 68   Resp: 18   Temp: 98.3 °F (36.8 °C)   TempSrc: Oral   SpO2: 98%   Weight: 195 lb 12.8 oz (88.8 kg)   Height: 5' 6.5\" (1.689 m)     Body mass index is 31.13 kg/(m^2). Physical Exam   Constitutional: She is oriented to person, place, and time. She appears well-developed and well-nourished. HENT:   Head: Normocephalic. Neurological: She is alert and oriented to person, place, and time. Skin: Lesion noted. There is erythema. See picture scanned in the media section.     ~1cm round lesion noted from I&D-some drainage note. Granulated tissue noted. Negative for odor. Area warm to the touch, tender, edematous and erythematous. Sharpie pen jean noted from ED visit and the erythematous has not crossed the sharpie pen line. Full ROM. Psychiatric: She has a normal mood and affect. Her behavior is normal. Judgment and thought content normal.       No results found for this or any previous visit (from the past 24 hour(s)). Assessment and Plan:   1. Cellulitis of other specified site    Per [facility] lab report (scanned to CC)-Bactrim is the appropriate treatment for the heavy growth of MRSA. Advised patient to continue oral antibiotics, if not better in 2 days f/u with PCP by Friday. Advised to use Dial Antibacterial soap during treatment. ED precautions-fever and chills, edema, and excessive drainage. I have discussed the diagnosis with the patient and the intended plan as seen in the above orders. she has expressed understanding. The patient has received an after-visit summary and questions were answered concerning future plans. I have discussed medication side effects and warnings with the patient as well. Electronically Signed: Elayne Donnelly NP     History/Allergies   Patients past medical, surgical and family histories were reviewed and updated. Past Medical History:   Diagnosis Date    Asthma 4/5/2010    Bronchitis, mucopurulent recurrent ([address] 5/14/2010    CVA (cerebral vascular accident) ([address] 4/5/2010    Hypercholesteremia 4/5/2010      Past Surgical History:   Procedure Laterality Date    HX HYSTERECTOMY      HX TONSILLECTOMY       Family History   Problem Relation Age of Onset    Stroke Mother     Heart Disease Father      Social History     Social History    Marital status:      Spouse name: N/A    Number of children: N/A    Years of education: N/A     Occupational History    Not on file.      Social History Main Topics    Smoking status: Never Smoker    Smokeless tobacco: Never Used    Alcohol use No    Drug use: No    Sexual activity: Not on file     Other Topics Concern    Not on file     Social History Narrative         No Known Allergies    Disposition     Follow-up Disposition:  Return in about 3 days (around 8/17/2018) for cellulitis. Future Appointments  Date Time Provider Jero Glez   8/17/2018 8:00 AM Janessa Mccormick NP [facility] SCHED   10/31/2018 9:35 AM Bal Velazquez MD [facility] [place] SCHED            Current Medications after this visit     Current Outpatient Prescriptions   Medication Sig    cephALEXin (KEFLEX) 500 mg capsule TK 1 C PO Q 6 H    trimethoprim-sulfamethoxazole (BACTRIM, SEPTRA)  mg per tablet TK 2 TS PO Q 12 H UNTIL GONE    omeprazole (PRILOSEC) 20 mg capsule TAKE ONE CAPSULE BY MOUTH EVERY DAY    atorvastatin (LIPITOR) 40 mg tablet TAKE ONE TABLET BY MOUTH EVERY DAY *STOP NIACIN*    omega-3 fatty acids-vitamin e (FISH OIL) 1,000 mg cap Take 1 Cap by mouth two (2) times a day.  aspirin delayed-release 81 mg tablet Take 81 mg by mouth daily.  CALCIUM CARBONATE (SIAI-600 PO) Take  by mouth.  Cholecalciferol, Vitamin D3, (VITAMIN D) 2,000 unit Cap Take  by mouth.  VITAMIN B COMPLEX (VITAMIN B-100 COMPLEX PO) Take  by mouth. No current facility-administered medications for this visit. There are no discontinued medications.

## 2018-08-14 NOTE — PATIENT INSTRUCTIONS
Cellulitis: Care Instructions  Your Care Instructions    Cellulitis is a skin infection caused by bacteria, most often strep or staph. It often occurs after a break in the skin from a scrape, cut, bite, or puncture, or after a rash. Cellulitis may be treated without doing tests to find out what caused it. But your doctor may do tests, if needed, to look for a specific bacteria, like methicillin-resistant Staphylococcus aureus (MRSA). The doctor has checked you carefully, but problems can develop later. If you notice any problems or new symptoms, get medical treatment right away. Follow-up care is a key part of your treatment and safety. Be sure to make and go to all appointments, and call your doctor if you are having problems. It's also a good idea to know your test results and keep a list of the medicines you take. How can you care for yourself at home? · Take your antibiotics as directed. Do not stop taking them just because you feel better. You need to take the full course of antibiotics. · Prop up the infected area on pillows to reduce pain and swelling. Try to keep the area above the level of your heart as often as you can. · If your doctor told you how to care for your wound, follow your doctor's instructions. If you did not get instructions, follow this general advice:  ¨ Wash the wound with clean water 2 times a day. Don't use hydrogen peroxide or alcohol, which can slow healing. ¨ You may cover the wound with a thin layer of petroleum jelly, such as Vaseline, and a nonstick bandage. ¨ Apply more petroleum jelly and replace the bandage as needed. · Be safe with medicines. Take pain medicines exactly as directed. ¨ If the doctor gave you a prescription medicine for pain, take it as prescribed. ¨ If you are not taking a prescription pain medicine, ask your doctor if you can take an over-the-counter medicine.   To prevent cellulitis in the future  · Try to prevent cuts, scrapes, or other injuries to your skin. Cellulitis most often occurs where there is a break in the skin. · If you get a scrape, cut, mild burn, or bite, wash the wound with clean water as soon as you can to help avoid infection. Don't use hydrogen peroxide or alcohol, which can slow healing. · If you have swelling in your legs (edema), support stockings and good skin care may help prevent leg sores and cellulitis. · Take care of your feet, especially if you have diabetes or other conditions that increase the risk of infection. Wear shoes and socks. Do not go barefoot. If you have athlete's foot or other skin problems on your feet, talk to your doctor about how to treat them. When should you call for help? Call your doctor now or seek immediate medical care if:    · You have signs that your infection is getting worse, such as:  ¨ Increased pain, swelling, warmth, or redness. ¨ Red streaks leading from the area. ¨ Pus draining from the area. ¨ A fever.     · You get a rash.    Watch closely for changes in your health, and be sure to contact your doctor if:    · You do not get better as expected. Where can you learn more? Go to http://chuck-hank.info/. Karen Bravo in the search box to learn more about \"Cellulitis: Care Instructions. \"  Current as of: May 10, 2017  Content Version: 11.7  © 4067-3230 [employer]. Care instructions adapted under license by [employer] (which disclaims liability or warranty for this information). If you have questions about a medical condition or this instruction, always ask your healthcare professional. [place] [address] any warranty or liability for your use of this information.

## 2018-08-14 NOTE — PROGRESS NOTES
Chief Complaint   Patient presents with   [facility] Follow Up     1. Have you been to the ER, urgent care clinic since your last visit? Hospitalized since your last visit? Yes, 08/12/2018 [facility] ER, Cellulitis right inner thigh. 2. Have you seen or consulted any other health care providers outside of the [address] [place] [address] since your last visit? Include any pap smears or colon screening.  No

## 2018-08-14 NOTE — PROGRESS NOTES
Hospital Discharge Follow-Up      Date/Time:  2018 10:39 AM    Patient was admitted to [facility] on 2018 and discharged on 2018 for cellulitis. The physician discharge summary was available at the time of outreach. Patient was contacted within 2 business days of discharge. Top Challenges reviewed with the provider   · Final culture result pending         Method of communication with provider :face to face    Inpatient RRAT score: ED visit only  Was this a readmission? no   Patient stated reason for the readmission: n/a    Nurse Navigator (NN) contacted the patient by telephone to perform post hospital discharge assessment. Verified name and  with patient as identifiers. Provided introduction to self, and explanation of the Nurse Navigator role. Reviewed discharge instructions and red flags with patient who verbalized understanding. Patient given an opportunity to ask questions and does not have any further questions or concerns at this time. The patient agrees to contact the PCP office for questions related to their healthcare. NN provided contact information for future reference. Disease Specific:   N/A    Summary of patient's top problems:  1. Cellulitis    Home Health orders at discharge: [address]: n/a  Date of initial visit: [facility] ordered/company: n/a  Durable Medical Equipment received: n/a    Barriers to care? none    Advance Care Planning:   Does patient have an Advance Directive:  reviewed and current     Medication(s):   New Medications at Discharge: Bactrim, Keflex  Changed Medications at Discharge: none  Discontinued Medications at Discharge: none    Medication reconciliation was performed with patient, who verbalizes understanding of administration of home medications. There were no barriers to obtaining medications identified at this time.     Referral to Pharm D needed: no     Current Outpatient Prescriptions Medication Sig    cephALEXin (KEFLEX) 500 mg capsule TK 1 C PO Q 6 H    trimethoprim-sulfamethoxazole (BACTRIM, SEPTRA)  mg per tablet TK 2 TS PO Q 12 H UNTIL GONE    omeprazole (PRILOSEC) 20 mg capsule TAKE ONE CAPSULE BY MOUTH EVERY DAY    atorvastatin (LIPITOR) 40 mg tablet TAKE ONE TABLET BY MOUTH EVERY DAY *STOP NIACIN*    omega-3 fatty acids-vitamin e (FISH OIL) 1,000 mg cap Take 1 Cap by mouth two (2) times a day.  aspirin delayed-release 81 mg tablet Take 81 mg by mouth daily.  CALCIUM CARBONATE (ISAI-600 PO) Take  by mouth.  Cholecalciferol, Vitamin D3, (VITAMIN D) 2,000 unit Cap Take  by mouth.  VITAMIN B COMPLEX (VITAMIN B-100 COMPLEX PO) Take  by mouth. No current facility-administered medications for this visit. There are no discontinued medications. [facility] follow up appointment(s):   Future Appointments  Date Time Provider Jero Glez   8/17/2018 8:00 AM Hima Ozuna NP [facility] SCHED   10/31/2018 9:35 AM Piper Benedict MD [facility] [place] SCHED      Non-[facility] follow up appointment(s): n/a  [facility]:  n/a       Goals      Knowledge and adherence of prescribed medication (ie. action, side effects, missed dose, etc.).            8/14/2018:  Patient understands to complete all abx.  Understands red flags post discharge. Red flags/sepsis: fever or below normal temperature (97f), chills, nausea, vomiting, diarrhea, chest pain, shortness of breath, unable to take medications, unusual sensations, and BFAST.

## 2018-08-17 ENCOUNTER — OFFICE VISIT (OUTPATIENT)
Dept: FAMILY MEDICINE CLINIC | Age: 73
End: 2018-08-17

## 2018-08-17 VITALS
TEMPERATURE: 98 F | RESPIRATION RATE: 18 BRPM | WEIGHT: 195 LBS | SYSTOLIC BLOOD PRESSURE: 111 MMHG | BODY MASS INDEX: 30.61 KG/M2 | HEIGHT: 67 IN | HEART RATE: 57 BPM | OXYGEN SATURATION: 97 % | DIASTOLIC BLOOD PRESSURE: 74 MMHG

## 2018-08-17 DIAGNOSIS — B95.62 MRSA CELLULITIS: Primary | ICD-10-CM

## 2018-08-17 DIAGNOSIS — L03.90 MRSA CELLULITIS: Primary | ICD-10-CM

## 2018-08-17 NOTE — PROGRESS NOTES
1. Have you been to the ER, urgent care clinic since your last visit? Hospitalized since your last visit? No    2. Have you seen or consulted any other health care providers outside of the [address] [place] [address] since your last visit? Include any pap smears or colon screening.  No  Reviewed record in preparation for visit and have necessary documentation  Pt did not bring medication to office visit for review  opportunity was given for questions  Goals that were addressed and/or need to be completed during or after this appointment include    Health Maintenance Due   Topic Date Due    GLAUCOMA SCREENING Q2Y  07/06/2018    Influenza Age 5 to Adult  08/01/2018    BREAST CANCER SCRN MAMMOGRAM  10/30/2018

## 2018-08-17 NOTE — PATIENT INSTRUCTIONS
MRSA: Care Instructions  Your Care Instructions    MRSA stands for methicillin-resistant Staphylococcus aureus. It is a type of bacteria that can cause a staph infection. But it cannot be killed by the antibiotic methicillin and some other antibiotics. This sometimes makes it harder to treat. The bacteria are widespread on skin and in the nose. MRSA can cause infections of the skin, heart, blood, and bones. The bacteria can spread quickly in the body and cause serious problems. MRSA can also be spread from person to person. Depending on how serious your infection is, the doctor may drain your wound and you may get antibiotics through a small tube placed in a vein (IV). Your doctor may also give you an antibiotic ointment to use on sores or in your nose. Follow-up care is a key part of your treatment and safety. Be sure to make and go to all appointments, and call your doctor if you are having problems. It's also a good idea to know your test results and keep a list of the medicines you take. How can you care for yourself at home? · Take your antibiotics as directed. Do not stop taking them just because you feel better. You need to take the full course of antibiotics. · Keep any cuts or other wounds covered while they heal.  · Wash your hands often, especially after you touch elastic bandages or other dressings over a wound. This can keep the bacteria from spreading. Wrap bandages in a plastic bag before you throw them away. · Do not share towels, washcloths, razors, clothing, or other items that touched your wound or bandage. Wash your sheets, towels, and clothes with warm water and detergent. Dry them in a hot dryer, if possible. · Keep shared areas clean by wiping down surfaces (such as countertops, doorknobs, and light switches) with a disinfectant. When should you call for help?   Call your doctor now or seek immediate medical care if:    · You have worse symptoms of infection, such as:  ¨ Increased pain, swelling, warmth, or redness. ¨ Red streaks leading from the area. ¨ Pus draining from the area. ¨ A fever.    Watch closely for changes in your health, and be sure to contact your doctor if:    · You do not get better as expected. Where can you learn more? Go to http://chuck-hank.info/. Enter S841 in the search box to learn more about \"MRSA: Care Instructions. \"  Current as of: November 18, 2017  Content Version: 11.7  © 0468-8090 [employer]. Care instructions adapted under license by [employer] (which disclaims liability or warranty for this information). If you have questions about a medical condition or this instruction, always ask your healthcare professional. [address] any warranty or liability for your use of this information.

## 2018-08-17 NOTE — PROGRESS NOTES
David Mark  74 y.o. female  1945  [address] [facility]  [address] [facility] [address]  [id number]     [facility]: Progress Note       Encounter Date: 8/17/2018    Chief Complaint   Patient presents with    Skin Infection     follow up     History of Present Illness   David Mark is a 74 y.o. female who presents to clinic today for:    Per [facility] lab report-MRSA positive with cellulitis. Patient states the antibiotics are helping and the site looks and feels better. Patient states the area is  due to the location (right inner thigh). She is compliant with the keflex and bactrim. She continues to keep the area clean with dial antibacterial soap. Denies-fever, drainage, odor. Health Maintenance    Health Maintenance Due   Topic Date Due    GLAUCOMA SCREENING Q2Y  07/06/2018    Influenza Age 5 to Adult  08/01/2018    BREAST CANCER SCRN MAMMOGRAM  10/30/2018     Review of Systems   Review of Systems   All other systems reviewed and are negative. Vitals/Objective:     Vitals:    08/17/18 0827   BP: 111/74   Pulse: (!) 57   Resp: 18   Temp: 98 °F (36.7 °C)   TempSrc: Oral   SpO2: 97%   Weight: 195 lb (88.5 kg)   Height: 5' 6.5\" (1.689 m)     Body mass index is 31 kg/(m^2). Physical Exam   Constitutional: She appears well-developed and well-nourished. She is active and cooperative. Neurological: She is alert. Skin: Skin is warm and dry. There is erythema. Psychiatric: She has a normal mood and affect. Her speech is normal and behavior is normal. Judgment and thought content normal. Cognition and memory are normal.       No results found for this or any previous visit (from the past 24 hour(s)). Assessment and Plan:   1. MRSA cellulitis    Advised patient on strict handwashing. Continue antibiotics. Will have patient f/u in 1 week to assess site. I have discussed the diagnosis with the patient and the intended plan as seen in the above orders.   she has expressed understanding. The patient has received an after-visit summary and questions were answered concerning future plans. I have discussed medication side effects and warnings with the patient as well. Electronically Signed: Seng Sheikh NP     History/Allergies   Patients past medical, surgical and family histories were reviewed and updated. Past Medical History:   Diagnosis Date    Asthma 4/5/2010    Bronchitis, mucopurulent recurrent ([address] 5/14/2010    CVA (cerebral vascular accident) ([facility] [address] [address] 4/5/2010    Hypercholesteremia 4/5/2010      Past Surgical History:   Procedure Laterality Date    HX HYSTERECTOMY      HX TONSILLECTOMY       Family History   Problem Relation Age of Onset    Stroke Mother     Heart Disease Father      Social History     Social History    Marital status:      Spouse name: N/A    Number of children: N/A    Years of education: N/A     Occupational History    Not on file.      Social History Main Topics    Smoking status: Never Smoker    Smokeless tobacco: Never Used    Alcohol use No    Drug use: No    Sexual activity: Not on file     Other Topics Concern    Not on file     Social History Narrative         No Known Allergies    Disposition     Follow-up Disposition: Not on File    Future Appointments  Date Time Provider Jero Glez   8/24/2018 8:00 AM Seng Sheikh NP [facility] SCHED   10/31/2018 9:35 AM Lilly Santos MD [facility] SCHED            Current Medications after this visit     Current Outpatient Prescriptions   Medication Sig    cephALEXin (KEFLEX) 500 mg capsule TK 1 C PO Q 6 H    trimethoprim-sulfamethoxazole (BACTRIM, SEPTRA)  mg per tablet TK 2 TS PO Q 12 H UNTIL GONE    omeprazole (PRILOSEC) 20 mg capsule TAKE ONE CAPSULE BY MOUTH EVERY DAY    atorvastatin (LIPITOR) 40 mg tablet TAKE ONE TABLET BY MOUTH EVERY DAY *STOP NIACIN*    omega-3 fatty acids-vitamin e (FISH OIL) 1,000 mg cap Take 1 Cap by mouth two (2) times a day.  aspirin delayed-release 81 mg tablet Take 81 mg by mouth daily.  CALCIUM CARBONATE (ISAI-600 PO) Take  by mouth.  Cholecalciferol, Vitamin D3, (VITAMIN D) 2,000 unit Cap Take  by mouth.  VITAMIN B COMPLEX (VITAMIN B-100 COMPLEX PO) Take  by mouth. No current facility-administered medications for this visit. There are no discontinued medications.

## 2018-08-20 ENCOUNTER — TELEPHONE (OUTPATIENT)
Dept: FAMILY MEDICINE CLINIC | Age: 73
End: 2018-08-20

## 2018-08-20 NOTE — TELEPHONE ENCOUNTER
Pt called to advise that she was told by NP to call in how much medication she had left. Pt reports that she has 6 days left of trimethoprim and 2 days left of Cephalexin.

## 2018-08-24 ENCOUNTER — OFFICE VISIT (OUTPATIENT)
Dept: FAMILY MEDICINE CLINIC | Age: 73
End: 2018-08-24

## 2018-08-24 VITALS
SYSTOLIC BLOOD PRESSURE: 115 MMHG | RESPIRATION RATE: 16 BRPM | OXYGEN SATURATION: 94 % | HEART RATE: 60 BPM | HEIGHT: 67 IN | WEIGHT: 196 LBS | DIASTOLIC BLOOD PRESSURE: 63 MMHG | TEMPERATURE: 98 F | BODY MASS INDEX: 30.76 KG/M2

## 2018-08-24 DIAGNOSIS — L03.90 MRSA CELLULITIS: Primary | ICD-10-CM

## 2018-08-24 DIAGNOSIS — B95.62 MRSA CELLULITIS: Primary | ICD-10-CM

## 2018-08-24 NOTE — PROGRESS NOTES
1. Have you been to the ER, urgent care clinic since your last visit? Hospitalized since your last visit? No    2. Have you seen or consulted any other health care providers outside of the [address] [place] [address] since your last visit? Include any pap smears or colon screening.  No  Reviewed record in preparation for visit and have necessary documentation  Pt did not bring medication to office visit for review    Goals that were addressed and/or need to be completed during or after this appointment include   Health Maintenance Due   Topic Date Due    GLAUCOMA SCREENING Q2Y  07/06/2018    Influenza Age 5 to Adult  08/01/2018    BREAST CANCER SCRN MAMMOGRAM  10/30/2018     Will request eye exam results from Dr Israel Santiago has an appt for mammogram in Nov 2018

## 2018-08-24 NOTE — PROGRESS NOTES
Dorita Ureña  74 y.o. female  1945  [address] [facility]  [address] [address]  [id number]     [facility]: Progress Note       Encounter Date: 8/24/2018    Chief Complaint   Patient presents with    Leg Swelling     follow up      History of Present Illness   Dorita Ureña is a 74 y.o. female who presents to clinic today for:  right inner thigh MRSA cellulitis. Denies fever, drainage, odor, new lesions. Positive for itching and some tenderness. She applied a OTC triple antibiotic to the sight. Compliant with oral antibiotic; 2 days of Bactrim remaining. She completed the Keflex. Health Maintenance  Health Maintenance Due   Topic Date Due    GLAUCOMA SCREENING Q2Y  07/06/2018    Influenza Age 5 to Adult  08/01/2018    BREAST CANCER SCRN MAMMOGRAM  10/30/2018     Review of Systems   Review of Systems   Constitutional: Negative. HENT: Negative. Eyes: Negative. Respiratory: Negative. Cardiovascular: Negative. Gastrointestinal: Negative. Genitourinary: Negative. Musculoskeletal: Negative. Skin: Positive for itching. Neurological: Negative. Endo/Heme/Allergies: Negative. Psychiatric/Behavioral: Negative. Vitals/Objective:     Vitals:    08/24/18 0808   BP: 115/63   Pulse: 60   Resp: 16   Temp: 98 °F (36.7 °C)   TempSrc: Oral   SpO2: 94%   Weight: 196 lb (88.9 kg)   Height: 5' 6.5\" (1.689 m)     Body mass index is 31.16 kg/(m^2). Physical Exam   Constitutional: She is oriented to person, place, and time. She appears well-developed and well-nourished. HENT:   Head: Normocephalic. Pulmonary/Chest: Effort normal.   Musculoskeletal: Normal range of motion. Neurological: She is alert and oriented to person, place, and time. Skin: Skin is warm and dry. There is erythema. ~1cm right inner thigh I&D site healing. Tender to touch. Some erythema noted around the site. Surrounding skin intact. Skin blanches and brisk cap refill.    Psychiatric: She has a normal mood and affect. Her behavior is normal. Judgment and thought content normal.       No results found for this or any previous visit (from the past 24 hour(s)). Assessment and Plan:   1. MRSA cellulitis    Discussed completing the Bactrim and strict hand washing. ED parameters for fever, drainage, and pain. I have discussed the diagnosis with the patient and the intended plan as seen in the above orders. she has expressed understanding. The patient has received an after-visit summary and questions were answered concerning future plans. I have discussed medication side effects and warnings with the patient as well. Electronically Signed: Pat Mireles NP     History/Allergies   Patients past medical, surgical and family histories were reviewed and updated. Past Medical History:   Diagnosis Date    Asthma 4/5/2010    Bronchitis, mucopurulent recurrent ([facility] [address] 5/14/2010    CVA (cerebral vascular accident) ([facility] [address] 4/5/2010    Hypercholesteremia 4/5/2010      Past Surgical History:   Procedure Laterality Date    HX HYSTERECTOMY      HX TONSILLECTOMY       Family History   Problem Relation Age of Onset    Stroke Mother     Heart Disease Father      Social History     Social History    Marital status:      Spouse name: N/A    Number of children: N/A    Years of education: N/A     Occupational History    Not on file. Social History Main Topics    Smoking status: Never Smoker    Smokeless tobacco: Never Used    Alcohol use No    Drug use: No    Sexual activity: Not on file     Other Topics Concern    Not on file     Social History Narrative         No Known Allergies    Disposition     Follow-up Disposition:  Return if symptoms worsen or fail to improve.     Future Appointments  Date Time Provider Jero Glez   10/31/2018 9:35 AM Ursula San MD [facility] SCHED            Current Medications after this visit     Current Outpatient Prescriptions Medication Sig    cephALEXin (KEFLEX) 500 mg capsule TK 1 C PO Q 6 H    trimethoprim-sulfamethoxazole (BACTRIM, SEPTRA)  mg per tablet TK 2 TS PO Q 12 H UNTIL GONE    omeprazole (PRILOSEC) 20 mg capsule TAKE ONE CAPSULE BY MOUTH EVERY DAY    atorvastatin (LIPITOR) 40 mg tablet TAKE ONE TABLET BY MOUTH EVERY DAY *STOP NIACIN*    omega-3 fatty acids-vitamin e (FISH OIL) 1,000 mg cap Take 1 Cap by mouth two (2) times a day.  aspirin delayed-release 81 mg tablet Take 81 mg by mouth daily.  CALCIUM CARBONATE (ISAI-600 PO) Take  by mouth.  Cholecalciferol, Vitamin D3, (VITAMIN D) 2,000 unit Cap Take  by mouth.  VITAMIN B COMPLEX (VITAMIN B-100 COMPLEX PO) Take  by mouth. No current facility-administered medications for this visit. There are no discontinued medications.

## 2018-08-24 NOTE — PATIENT INSTRUCTIONS
Learning About Preventing MRSA Infection  What is a MRSA infection? MRSA stands for methicillin-resistant Staphylococcus aureus. It is a type of bacteria that can cause a staph infection. Staph bacteria normally live on your skin and in your nose, usually without causing problems. Sometimes the bacteria cause infection. Usually you can treat this infection with antibiotics. But MRSA infections are harder to treat than other staph infections. This is because antibiotics may not be able to kill MRSA. For some people, especially those who are weak or ill, MRSA infections can become serious. MRSA can spread from person to person. It is commonly spread from the hands of someone who has MRSA. This could be anyone in a health care setting or in the community. MRSA is more likely to develop when antibiotics are used too often or aren't used the right way. Over time, bacteria can change so that these antibiotics no longer work well. How can you prevent MRSA infection? Practice good hygiene  · Wash your hands often and thoroughly with soap and clean, running water. You can also use an alcohol-based hand . Hand-washing is the best way to avoid spreading germs. · Keep cuts and scrapes clean and covered with a bandage. Avoid contact with other people's wounds or bandages. · Don't share personal items such as towels or razors. · If you are in the hospital, remind doctors and nurses to wash their hands before they touch you. Use antibiotics wisely  · Always ask your doctor if antibiotics are the best treatment. They can help treat bacterial infections, but they can't cure viral infections. Don't pressure your doctor to prescribe antibiotics when they won't help you get better. · If your doctor prescribed antibiotics, take them as directed. Do not stop taking them just because you feel better. You need to take the full course of antibiotics.  Using only part of the medicine may cause antibiotic-resistant bacteria to develop. · Do not save any antibiotics. Don't use antibiotics that were prescribed for someone else. What are the symptoms? Symptoms of a MRSA infection depend on where the infection is:  · If the infection is in a wound, that area of your skin may be red or tender. · If the infection is in the skin, you may have boils or abscesses. It may look like you have been bitten by a spider or insect. How is it diagnosed? The doctor will take a sample of your infected wound or take a blood or urine sample. The sample is tested to see if antibiotics can kill the bacteria. This test may take several days. You may also be tested to see if you are a MRSA carrier. A carrier is a person who has the bacteria on his or her skin but who isn't sick. The doctor will take a swab from the inside of your nose for this test.  How is MRSA treated? Your doctor may:  · Drain your wound. · Give you antibiotics as pills or through a needle put in your vein (IV). · Give you an ointment to put on your skin or inside your nose. · Have you wash your skin daily with an antiseptic soap. You may have to stay in the hospital for treatment. In the hospital, you may be kept apart from others to reduce the chances of spreading the bacteria. Follow-up care is a key part of your treatment and safety. Be sure to make and go to all appointments, and call your doctor if you are having problems. It's also a good idea to know your test results and keep a list of the medicines you take. Where can you learn more? Go to http://chuck-hank.info/. Enter [id number] in the search box to learn more about \"Learning About Preventing MRSA Infection. \"  Current as of: November 18, 2017  Content Version: 11.7  © 0356-6591 [employer]. Care instructions adapted under license by [employer] (which disclaims liability or warranty for this information).  If you have questions about a medical condition or this instruction, always ask your healthcare professional. [place] [address] any warranty or liability for your use of this information.

## 2018-10-31 ENCOUNTER — OFFICE VISIT (OUTPATIENT)
Dept: FAMILY MEDICINE CLINIC | Age: 73
End: 2018-10-31

## 2018-10-31 VITALS
SYSTOLIC BLOOD PRESSURE: 130 MMHG | DIASTOLIC BLOOD PRESSURE: 71 MMHG | HEART RATE: 71 BPM | OXYGEN SATURATION: 95 % | WEIGHT: 196.2 LBS | RESPIRATION RATE: 20 BRPM | BODY MASS INDEX: 30.79 KG/M2 | TEMPERATURE: 98.3 F | HEIGHT: 67 IN

## 2018-10-31 DIAGNOSIS — J41.1 BRONCHITIS, MUCOPURULENT RECURRENT (HCC): Primary | Chronic | ICD-10-CM

## 2018-10-31 DIAGNOSIS — M19.90 ARTHRITIS PAIN: ICD-10-CM

## 2018-10-31 DIAGNOSIS — B95.62 CELLULITIS DUE TO MRSA: ICD-10-CM

## 2018-10-31 DIAGNOSIS — E78.00 HYPERCHOLESTEREMIA: Chronic | ICD-10-CM

## 2018-10-31 DIAGNOSIS — E55.9 VITAMIN D DEFICIENCY: ICD-10-CM

## 2018-10-31 DIAGNOSIS — J45.20 MILD INTERMITTENT ASTHMA WITHOUT COMPLICATION: Chronic | ICD-10-CM

## 2018-10-31 DIAGNOSIS — L03.90 CELLULITIS DUE TO MRSA: ICD-10-CM

## 2018-10-31 DIAGNOSIS — Z23 ENCOUNTER FOR IMMUNIZATION: ICD-10-CM

## 2018-10-31 RX ORDER — SULFAMETHOXAZOLE AND TRIMETHOPRIM 400; 80 MG/1; MG/1
TABLET ORAL
Qty: 40 TAB | Refills: 0 | Status: SHIPPED | OUTPATIENT
Start: 2018-10-31 | End: 2018-12-20 | Stop reason: ALTCHOICE

## 2018-10-31 NOTE — LETTER
11/1/2018 9:24 AM 
 
Ms. Dillan August [address] [address] Dear Dillan August: 
 
Please find your most recent results below. Your labs are excellent. Continue the current treatment plan and keep up the good work! Call if you have questions. Resulted Orders METABOLIC PANEL, COMPREHENSIVE Result Value Ref Range Glucose 94 65 - 99 mg/dL BUN 15 8 - 27 mg/dL Creatinine 0.84 0.57 - 1.00 mg/dL GFR est non-AA 68 >59 mL/min/1.73 GFR est AA 79 >59 mL/min/1.73  
 BUN/Creatinine ratio 18 12 - 28 Sodium 136 134 - 144 mmol/L Potassium 4.6 3.5 - 5.2 mmol/L Chloride 101 96 - 106 mmol/L  
 CO2 23 20 - 29 mmol/L Calcium 9.9 8.7 - 10.3 mg/dL Protein, total 6.9 6.0 - 8.5 g/dL Albumin 4.3 3.5 - 4.8 g/dL GLOBULIN, TOTAL 2.6 1.5 - 4.5 g/dL A-G Ratio 1.7 1.2 - 2.2 Bilirubin, total 1.0 0.0 - 1.2 mg/dL Alk. phosphatase 84 39 - 117 IU/L  
 AST (SGOT) 22 0 - 40 IU/L  
 ALT (SGPT) 20 0 - 32 IU/L Narrative Performed at:  [address]  [id number] : Angela Fernandez MD, Phone:  [phone] LIPID PANEL Result Value Ref Range Cholesterol, total 143 100 - 199 mg/dL Triglyceride 81 0 - 149 mg/dL HDL Cholesterol 51 >39 mg/dL VLDL, calculated 16 5 - 40 mg/dL LDL, calculated 76 0 - 99 mg/dL Narrative Performed at:  [address]  [id number] : Angela Fernandez MD, Phone:  [phone] VITAMIN D, 25 HYDROXY Result Value Ref Range VITAMIN D, 25-HYDROXY 31.0 30.0 - 100.0 ng/mL Comment:  
   Vitamin D deficiency has been defined by the [facility] practice guideline as a 
level of serum 25-OH vitamin D less than 20 ng/mL (1,2). The Endocrine Society went on to further define vitamin D 
insufficiency as a level between 21 and 29 ng/mL (2). 1. IOM ([employer] of [employer]). 2010.  Dietary reference 
   intakes for calcium and D. [facility]: The 
   [employer]. 2. Harsh MF, Berenice NC, Micheal-Nahum CARD, et al. 
   Evaluation, treatment, and prevention of vitamin D 
   deficiency: an Endocrine Society clinical practice 
   guideline. JCEM. 2011 Jul; 96(7):1911-30. Narrative Performed at:  [address]  [id number] : Miguel Santizo MD, Phone:  [phone] Please call me if you have any questions: [phone] Sincerely, 
 
 
Usama Otoole MD

## 2018-10-31 NOTE — PROGRESS NOTES
1. Have you been to the ER, urgent care clinic since your last visit? Hospitalized since your last visit? No    2. Have you seen or consulted any other health care providers outside of the [address] [place] [address] since your last visit? Include any pap smears or colon screening.  No  Reviewed record in preparation for visit and have necessary documentation  Goals that were addressed and/or need to be completed during or after this appointment include     Health Maintenance Due   Topic Date Due    Shingrix Vaccine Age 50> (1 of 2) 08/25/1993    GLAUCOMA SCREENING Q2Y  07/06/2018    Influenza Age 9 to Adult  08/01/2018

## 2018-10-31 NOTE — PROGRESS NOTES
[address], [address] [facility]  [phone]           Progress Note    Patient: Geovanna Corley MRN: [id number]  SSN: [id number]    YOB: 1945  Age: 74 y.o. Sex: female        Chief Complaint   Patient presents with    Cholesterol Problem         Subjective:     Encounter Diagnoses   Name Primary?  Bronchitis, mucopurulent recurrent ([address]):  No current symptoms. Yes    Hypercholesteremia:  Cardiovascular risks for her are: LDL goal is under 80  hyperlipidemia  prior CVA/TIA or known carotid artery disease. Key Antihyperlipidemia Meds             atorvastatin (LIPITOR) 40 mg tablet  (Taking) TAKE ONE TABLET BY MOUTH EVERY DAY *STOP NIACIN*    omega-3 fatty acids-vitamin e (FISH OIL) 1,000 mg cap  (Taking) Take 1 Cap by mouth two (2) times a day. Lab Results   Component Value Date/Time    Cholesterol, total 151 04/25/2018 03:19 PM    HDL Cholesterol 51 04/25/2018 03:19 PM    LDL, calculated 80 04/25/2018 03:19 PM    Triglyceride 99 04/25/2018 03:19 PM    CHOL/HDL Ratio 2.6 09/21/2010 08:03 AM     Lab Results   Component Value Date/Time    ALT (SGPT) 23 04/25/2018 03:19 PM    AST (SGOT) 23 04/25/2018 03:19 PM    Alk. phosphatase 85 04/25/2018 03:19 PM    Bilirubin, direct 0.24 08/18/2017 12:38 PM    Bilirubin, total 0.7 04/25/2018 03:19 PM      Myalgias: No   Fatigue: No   Other side effects: no  Wt Readings from Last 3 Encounters:   10/31/18 196 lb 3.2 oz (89 kg)   08/24/18 196 lb (88.9 kg)   08/17/18 195 lb (88.5 kg)     The patient is aware of our goal to reduce or eliminate the long term problems (such as strokes and heart attacks) related to poorly controlled hyperlipidemia.  Mild intermittent asthma without complication:  asthma, not currently in exacerbation. Asthma symptoms occur: infrequently. Wheezing when present is described as mild and easily relieved with rescue bronchodilator.   Current limitations in activity from asthma: none. Frequency of use of quick-relief meds: rare. Medication compliance: Good  Patient does not smoke cigarettes. Monitors Peak Flow at home: no           Vitamin D deficiency:  No sx. Due for testing. Lab Results   Component Value Date/Time    Vitamin D 25-Hydroxy 26 (L) 09/21/2010 08:03 AM    VITAMIN D, 25-HYDROXY 36.6 10/25/2017 03:55 PM              Arthritis pain:  Mild and no major complaints.  Cellulitis due to MRSA; she has had for episodes of MRSA boils and/or cellulitis in the past 12 months. She is using Dial antibacterial soap to decrease the colony count on her skin. She needs a nasal culture to see if she is a MRSA carrier. I also gave her a prescription for Bactrim to begin at the very onset of any of these symptoms because it is so difficult to treat. She is to see a doctor within 3 days after starting that medication.  Encounter for immunization: Given a prescription for Shingrix and also given a flu vaccine while in the office. Current and past medical information:    Current Medications after this visit[de-identified]     Current Outpatient Medications   Medication Sig    trimethoprim-sulfamethoxazole (BACTRIM, SEPTRA)  mg per tablet TK 2 TS PO Q 12 H UNTIL GONE    varicella-zoster gE vac,2 of 2 50 mcg susr Shingrix, one injection now and repeat in 4-6 months. To be administered at Pharmacy. Fax confirmation to me at [phone].  omeprazole (PRILOSEC) 20 mg capsule TAKE ONE CAPSULE BY MOUTH EVERY DAY    atorvastatin (LIPITOR) 40 mg tablet TAKE ONE TABLET BY MOUTH EVERY DAY *STOP NIACIN*    omega-3 fatty acids-vitamin e (FISH OIL) 1,000 mg cap Take 1 Cap by mouth two (2) times a day.  aspirin delayed-release 81 mg tablet Take 81 mg by mouth daily.  CALCIUM CARBONATE (ISAI-600 PO) Take  by mouth.  Cholecalciferol, Vitamin D3, (VITAMIN D) 2,000 unit Cap Take  by mouth.     VITAMIN B COMPLEX (VITAMIN B-100 COMPLEX PO) Take  by mouth.  cephALEXin (KEFLEX) 500 mg capsule TK 1 C PO Q 6 H     No current facility-administered medications for this visit. Patient Active Problem List    Diagnosis Date Noted    Vitamin D deficiency 01/07/2014     Priority: 1 - One    Hypercholesteremia 04/05/2010     Priority: 1 - One    CVA (cerebral vascular accident) ([address] 04/05/2010     Priority: 1 - One    Asthma 04/05/2010     Priority: 1 - One    Cataract, nuclear sclerotic, left eye 07/10/2017    Dermatochalasis of left upper eyelid 07/10/2017    Dermatochalasis of right upper eyelid 07/10/2017    PCO (posterior capsular opacification), right 07/10/2017    Pseudophakia of right eye 07/10/2017    Arthritis pain 12/09/2014    Bronchitis, mucopurulent recurrent ([address] 05/14/2010       Past Medical History:   Diagnosis Date    Asthma 4/5/2010    Bronchitis, mucopurulent recurrent ([address] 5/14/2010    CVA (cere[facility] vascular accident) [facility] 4/5/2010    Hypercholesteremia 4/5/2010       No Known Allergies    Past Surgical History:   Procedure Laterality Date    HX HYSTERECTOMY      HX TONSILLECTOMY         Social History     Socioeconomic History    Marital status:      Spouse name: Not on file    Number of children: Not on file    Years of education: Not on file    Highest education level: Not on file   Social Needs    Financial resource strain: Not on file    Food insecurity - worry: Not on file    Food insecurity - inability: Not on file   [place] needs - medical: Not on file   [place] [employer] needs - non-medical: Not on file   Occupational History    Not on file   Tobacco Use    Smoking status: Never Smoker    Smokeless tobacco: Never Used   Substance and Sexual Activity    Alcohol use: No    Drug use: No    Sexual activity: Not on file   Other Topics Concern    Not on file   Social History Narrative    Not on file       Review of Systems   Constitutional: Negative.   Negative for chills, fever, malaise/fatigue and weight loss. HENT: Negative. Negative for hearing loss. Eyes: Negative. Negative for blurred vision and double vision. Respiratory: Negative. Negative for cough, hemoptysis, sputum production and shortness of breath. Cardiovascular: Negative. Negative for chest pain, palpitations and orthopnea. Gastrointestinal: Negative. Negative for abdominal pain, blood in stool, heartburn, nausea and vomiting. Genitourinary: Negative. Negative for dysuria, frequency and urgency. Musculoskeletal: Negative. Negative for back pain, myalgias and neck pain. Skin: Negative. Negative for rash. Neurological: Negative. Negative for dizziness, tingling, tremors, weakness and headaches. Endo/Heme/Allergies: Negative. Psychiatric/Behavioral: Negative. Negative for depression. Objective:     Vitals:    10/31/18 0938   BP: 130/71   Pulse: 71   Resp: 20   Temp: 98.3 °F (36.8 °C)   TempSrc: Oral   SpO2: 95%   Weight: 196 lb 3.2 oz (89 kg)   Height: 5' 6.5\" (1.689 m)      Body mass index is 31.19 kg/m². Physical Exam   Constitutional: She is oriented to person, place, and time and well-developed, well-nourished, and in no distress. No distress. HENT:   Head: Normocephalic and atraumatic. Mouth/Throat: Oropharynx is clear and moist.   Eyes: Conjunctivae are normal.   Neck: No thyromegaly present. Cardiovascular: Normal rate, regular rhythm and normal heart sounds. No murmur heard. Pulmonary/Chest: Effort normal and breath sounds normal. No respiratory distress. Abdominal: Soft. She exhibits no distension. Neurological: She is alert and oriented to person, place, and time. Skin: Skin is warm. No rash noted. She is not diaphoretic. No erythema. Psychiatric: Mood and affect normal.   Nursing note and vitals reviewed.         Health Maintenance Due   Topic Date Due    Shingrix Vaccine Age 49> (1 of 2) 08/25/1993    GLAUCOMA SCREENING Q2Y  07/06/2018    Influenza Age 5 to Adult  08/01/2018         Assessment and orders:     Encounter Diagnoses     ICD-10-CM ICD-9-CM   1. Bronchitis, mucopurulent recurrent (HCC) J41.1 491.1   2. Hypercholesteremia E78.00 272.0   3. Mild intermittent asthma without complication N41.79 785.03   4. Vitamin D deficiency E55.9 268.9   5. Arthritis pain M19.90 716.90   6. Cellulitis due to MRSA L03.90 682.9    B95.62 041.12   7. Encounter for immunization Z23 V03.89     Diagnoses and all orders for this visit:    1. Bronchitis, mucopurulent recurrent (HCC)-not an active problem now. 2. Hypercholesteremia-retest  -     METABOLIC PANEL, COMPREHENSIVE  -     LIPID PANEL    3. Mild intermittent asthma without complication-controlled    4. Vitamin D deficiency-retest  -     VITAMIN D, 25 HYDROXY    5. Arthritis pain-no new treatment needed    6. Cellulitis due to MRSA-screen for carrier state and have antibiotic to start early. -     trimethoprim-sulfamethoxazole (BACTRIM, SEPTRA)  mg per tablet; TK 2 TS PO Q 12 H UNTIL GONE  -     MRSA SCREENING CULTURE    7. Encounter for immunization  -     varicella-zoster gE vac,2 of 2 50 mcg susr; Shingrix, one injection now and repeat in 4-6 months. To be administered at Pharmacy. Fax confirmation to me at [phone].  -     ADMIN INFLUENZA VIRUS VAC  -     INFLUENZA VACCINE INACTIVATED (IIV), SUBUNIT, ADJUVANTED, IM      Plan of care:  Discussed diagnoses in detail with patient. Medication risks/benefits/side effects discussed with patient. All of the patient's questions were addressed. The patient understands and agrees with our plan of care. The patient knows to call back if they are unsure of or forget any changes we discussed today or if the symptoms change. The patient received an After-Visit Summary which contains VS, orders, medication list and allergy list. This can be used as a \"mini-medical record\" should they have to seek medical care while out of town.     Patient Care Team:  Juan Francisco Georges MD as PCP - Flakito Chester MD (Ophthalmology)    Follow-up Disposition:  Return in about 4 months (around 2/28/2019).     Future Appointments   Date Time Provider Jero Glez   2/12/2019  1:25 PM Juan Francisco Georges MD [facility] SCHED   2/28/2019  8:00 AM Juan Francisco Georges MD [facility] SCHED       Signed By: Jes Haque MD     October 31, 2018

## 2018-10-31 NOTE — PATIENT INSTRUCTIONS
MRSA: Care Instructions  Your Care Instructions    MRSA stands for methicillin-resistant Staphylococcus aureus. It is a type of bacteria that can cause a staph infection. But it cannot be killed by the antibiotic methicillin and some other antibiotics. This sometimes makes it harder to treat. The bacteria are widespread on skin and in the nose. MRSA can cause infections of the skin, heart, blood, and bones. The bacteria can spread quickly in the body and cause serious problems. MRSA can also be spread from person to person. Depending on how serious your infection is, the doctor may drain your wound and you may get antibiotics through a small tube placed in a vein (IV). Your doctor may also give you an antibiotic ointment to use on sores or in your nose. Follow-up care is a key part of your treatment and safety. Be sure to make and go to all appointments, and call your doctor if you are having problems. It's also a good idea to know your test results and keep a list of the medicines you take. How can you care for yourself at home? · Take your antibiotics as directed. Do not stop taking them just because you feel better. You need to take the full course of antibiotics. · Keep any cuts or other wounds covered while they heal.  · Wash your hands often, especially after you touch elastic bandages or other dressings over a wound. This can keep the bacteria from spreading. Wrap bandages in a plastic bag before you throw them away. · Do not share towels, washcloths, razors, clothing, or other items that touched your wound or bandage. Wash your sheets, towels, and clothes with warm water and detergent. Dry them in a hot dryer, if possible. · Keep shared areas clean by wiping down surfaces (such as countertops, doorknobs, and light switches) with a disinfectant. When should you call for help?   Call your doctor now or seek immediate medical care if:    · You have worse symptoms of infection, such as:  ? Increased pain, swelling, warmth, or redness. ? Red streaks leading from the area. ? Pus draining from the area. ? A fever.    Watch closely for changes in your health, and be sure to contact your doctor if:    · You do not get better as expected. Where can you learn more? Go to http://chuck-hank.info/. Enter B698 in the search box to learn more about \"MRSA: Care Instructions. \"  Current as of: November 18, 2017  Content Version: 11.8  © 1362-5174 [employer]. Care instructions adapted under license by [employer] (which disclaims liability or warranty for this information). If you have questions about a medical condition or this instruction, always ask your healthcare professional. [address] any warranty or liability for your use of this information.

## 2018-11-01 LAB
25(OH)D3+25(OH)D2 SERPL-MCNC: 31 NG/ML (ref 30–100)
ALBUMIN SERPL-MCNC: 4.3 G/DL (ref 3.5–4.8)
ALBUMIN/GLOB SERPL: 1.7 {RATIO} (ref 1.2–2.2)
ALP SERPL-CCNC: 84 IU/L (ref 39–117)
ALT SERPL-CCNC: 20 IU/L (ref 0–32)
AST SERPL-CCNC: 22 IU/L (ref 0–40)
BILIRUB SERPL-MCNC: 1 MG/DL (ref 0–1.2)
BUN SERPL-MCNC: 15 MG/DL (ref 8–27)
BUN/CREAT SERPL: 18 (ref 12–28)
CALCIUM SERPL-MCNC: 9.9 MG/DL (ref 8.7–10.3)
CHLORIDE SERPL-SCNC: 101 MMOL/L (ref 96–106)
CHOLEST SERPL-MCNC: 143 MG/DL (ref 100–199)
CO2 SERPL-SCNC: 23 MMOL/L (ref 20–29)
CREAT SERPL-MCNC: 0.84 MG/DL (ref 0.57–1)
GLOBULIN SER CALC-MCNC: 2.6 G/DL (ref 1.5–4.5)
GLUCOSE SERPL-MCNC: 94 MG/DL (ref 65–99)
HDLC SERPL-MCNC: 51 MG/DL
LDLC SERPL CALC-MCNC: 76 MG/DL (ref 0–99)
POTASSIUM SERPL-SCNC: 4.6 MMOL/L (ref 3.5–5.2)
PROT SERPL-MCNC: 6.9 G/DL (ref 6–8.5)
SODIUM SERPL-SCNC: 136 MMOL/L (ref 134–144)
TRIGL SERPL-MCNC: 81 MG/DL (ref 0–149)
VLDLC SERPL CALC-MCNC: 16 MG/DL (ref 5–40)

## 2018-11-02 LAB — MRSA SPEC QL CULT: NEGATIVE

## 2018-11-03 NOTE — PROGRESS NOTES
Call, negative MRSA screen. Treat recurrences early as we discussed.   Thank you,  Dr. Arsenio Gaines

## 2018-11-05 ENCOUNTER — TELEPHONE (OUTPATIENT)
Dept: FAMILY MEDICINE CLINIC | Age: 73
End: 2018-11-05

## 2018-11-05 NOTE — TELEPHONE ENCOUNTER
Left message on patients answering machine for her to call [facility] regarding lab results. Per Dr. Cari Rachel MRSA screen. Treat recurrences early as we discussed. \"

## 2018-11-05 NOTE — LETTER
11/7/2018 10:44 AM 
 
Ms. Obie Hassan [address] [address] Dear Ms. Grimaldononlilli Mederos, We have been unable to reach you by phone to notify you of your test results. Please call our office at [phone] and ask to speak with my nurse in order to explain these results to you and advise you of any recommendations. Sincerely, MANDA PERDOMO & MINDA SALAS [facility] [place]

## 2018-11-07 NOTE — TELEPHONE ENCOUNTER
Unable to reach patient by telephone. Detailed letter mailed to patient informing her of results:    Per Dr. Ailin Betancourt:  [place] [facility] MRSA screen. Treat recurrences early as we discussed. \"

## 2018-11-30 ENCOUNTER — OFFICE VISIT (OUTPATIENT)
Dept: FAMILY MEDICINE CLINIC | Age: 73
End: 2018-11-30

## 2018-11-30 VITALS
WEIGHT: 198 LBS | HEART RATE: 66 BPM | OXYGEN SATURATION: 96 % | RESPIRATION RATE: 18 BRPM | HEIGHT: 66 IN | BODY MASS INDEX: 31.82 KG/M2 | TEMPERATURE: 97.3 F | DIASTOLIC BLOOD PRESSURE: 76 MMHG | SYSTOLIC BLOOD PRESSURE: 127 MMHG

## 2018-11-30 DIAGNOSIS — E66.9 OBESITY (BMI 30.0-34.9): ICD-10-CM

## 2018-11-30 DIAGNOSIS — M25.552 LEFT HIP PAIN: Primary | ICD-10-CM

## 2018-11-30 NOTE — PROGRESS NOTES
Chief Complaint   Patient presents with    Hip Pain     left     Visit Vitals  /76 (BP 1 Location: Right arm, BP Patient Position: Sitting)   Pulse 66   Temp 97.3 °F (36.3 °C) (Oral)   Resp 18   Ht 5' 6\" (1.676 m)   Wt 198 lb (89.8 kg)   SpO2 96%   BMI 31.96 kg/m²     1. Have you been to the ER, urgent care clinic since your last visit? Hospitalized since your last visit? No    2. Have you seen or consulted any other health care providers outside of the [address] [place] [address] since your last visit? Include any pap smears or colon screening.  No    Reviewed record in preparation for visit and have necessary documentation  Pt did not bring medication to office visit for review  opportunity was given for questions  Goals that were addressed and/or need to be completed during or after this appointment include     Health Maintenance Due   Topic Date Due    Shingrix Vaccine Age 50> (1 of 2) 08/25/1993    GLAUCOMA SCREENING Q2Y  07/06/2018

## 2018-11-30 NOTE — PROGRESS NOTES
I saw and evaluated the patient, performing the key elements of the service. I discussed the findings, assessment and plan with the resident and agree with the resident's findings and plan as documented in the resident's note. Will get XR hip today and conservative management. Close follow-up if no improvement.

## 2018-11-30 NOTE — PATIENT INSTRUCTIONS
Starting a Weight Loss Plan: Care Instructions  Your Care Instructions    If you are thinking about losing weight, it can be hard to know where to start. Your doctor can help you set up a weight loss plan that best meets your needs. You may want to take a class on nutrition or exercise, or join a weight loss support group. If you have questions about how to make changes to your eating or exercise habits, ask your doctor about seeing a registered dietitian or an exercise specialist.  It can be a big challenge to lose weight. But you do not have to make huge changes at once. Make small changes, and stick with them. When those changes become habit, add a few more changes. If you do not think you are ready to make changes right now, try to pick a date in the future. Make an appointment to see your doctor to discuss whether the time is right for you to start a plan. Follow-up care is a key part of your treatment and safety. Be sure to make and go to all appointments, and call your doctor if you are having problems. It's also a good idea to know your test results and keep a list of the medicines you take. How can you care for yourself at home? · Set realistic goals. Many people expect to lose much more weight than is likely. A weight loss of 5% to 10% of your body weight may be enough to improve your health. · Get family and friends involved to provide support. Talk to them about why you are trying to lose weight, and ask them to help. They can help by participating in exercise and having meals with you, even if they may be eating something different. · Find what works best for you. If you do not have time or do not like to cook, a program that offers meal replacement bars or shakes may be better for you. Or if you like to prepare meals, finding a plan that includes daily menus and recipes may be best.  · Ask your doctor about other health professionals who can help you achieve your weight loss goals. ?  A dietitian can help you make healthy changes in your diet. ? An exercise specialist or  can help you develop a safe and effective exercise program.  ? A counselor or psychiatrist can help you cope with issues such as depression, anxiety, or family problems that can make it hard to focus on weight loss. · Consider joining a support group for people who are trying to lose weight. Your doctor can suggest groups in your area. Where can you learn more? Go to http://chuck-hank.info/. Enter M317 in the search box to learn more about \"Starting a Weight Loss Plan: Care Instructions. \"  Current as of: June 26, 2018  Content Version: 11.8  © 8541-0318 [employer], [employer]. Care instructions adapted under license by [employer] (which disclaims liability or warranty for this information). If you have questions about a medical condition or this instruction, always ask your healthcare professional. [address] any warranty or liability for your use of this information.

## 2018-11-30 NOTE — PROGRESS NOTES
[address] [facility]    Subjective: Luc Tsai is a 74 y.o. female with history of HLD, Vit D deficiency, asthma, CVA, cataracts, arthritis,   CC: Left hip pain  History provided by patient    HPI:    Patient presents to clinic with complaint of left hip pain. She reports that she was helping a friend who just got back from the hospital 3 days ago, and had to help her move to the toilet (friend weighs about 150 lbs). Denies any falls or apparent trauma. However, she felt really sore that day and next day, she woke up with left hip pain. Tenderness mainly located on left groin. She went to chiropracter (whom she sees regularly), who told her he couldn't do much for her pain and recommended f/u with a physician. Patient has put ice packs and use Motrin last night, without much improvement. She affirms \"I could not walk yesterday because it was so painful! \"     Patient had a DEXA scan on 11/19/2018, with osteopenia on b/l femoral neck. Of note, patient with hx of GI bleed 3-4 years ago to to excessive NSAIDs use. Current Outpatient Medications on File Prior to Visit   Medication Sig Dispense Refill    trimethoprim-sulfamethoxazole (BACTRIM, SEPTRA)  mg per tablet TK 2 TS PO Q 12 H UNTIL GONE 40 Tab 0    varicella-zoster gE vac,2 of 2 50 mcg susr Shingrix, one injection now and repeat in 4-6 months. To be administered at Pharmacy. Fax confirmation to me at [phone]. 2 Each 0    omeprazole (PRILOSEC) 20 mg capsule TAKE ONE CAPSULE BY MOUTH EVERY DAY 90 Cap 1    atorvastatin (LIPITOR) 40 mg tablet TAKE ONE TABLET BY MOUTH EVERY DAY *STOP NIACIN* 90 Tab 1    omega-3 fatty acids-vitamin e (FISH OIL) 1,000 mg cap Take 1 Cap by mouth two (2) times a day.  aspirin delayed-release 81 mg tablet Take 81 mg by mouth daily.  CALCIUM CARBONATE (ISAI-600 PO) Take  by mouth.  Cholecalciferol, Vitamin D3, (VITAMIN D) 2,000 unit Cap Take  by mouth.       VITAMIN B COMPLEX (VITAMIN B-100 COMPLEX PO) Take  by mouth.  cephALEXin (KEFLEX) 500 mg capsule TK 1 C PO Q 6 H  0     No current facility-administered medications on file prior to visit. Patient Active Problem List   Diagnosis Code    Hypercholesteremia E78.00    CVA (cerebral vascular accident) ([facility] [address] I63.9    Asthma J45.909    [facility], [facility]ur[facility] J41.1    Vitamin D deficiency E55.9    Arthritis pain M19.90    Cataract, nuclear sclerotic, left eye H25.12    Dermatochalasis of left upper eyelid H02.834    Dermatochalasis of right upper eyelid H02.831    PCO (posterior capsular opacification), right H26.491    Pseudophakia of right eye Z96.1       Social History     Socioeconomic History    Marital status:      Spouse name: Not on file    Number of children: Not on file    Years of education: Not on file    Highest education level: Not on file   Social Needs    Financial resource strain: Not on file    Food insecurity - worry: Not on file    Food insecurity - inability: Not on file   [employer] needs - medical: Not on file   [employer] needs - non-medical: Not on file   Occupational History    Not on file   Tobacco Use    Smoking status: Never Smoker    Smokeless tobacco: Never Used   Substance and Sexual Activity    Alcohol use: No    Drug use: No    Sexual activity: Not on file   Other Topics Concern    Not on file   Social History Narrative    Not on file       Review of Systems   Constitutional: Negative for chills and fever. HENT: Negative for congestion. Respiratory: Negative for cough. Cardiovascular: Negative for chest pain and palpitations. Gastrointestinal: Negative for abdominal pain and diarrhea. Genitourinary: Negative for dysuria. Musculoskeletal: Positive for joint pain. Negative for falls. Neurological: Negative for dizziness and headaches. Psychiatric/Behavioral: Negative for depression.          Objective:     Visit Vitals  BP 127/76 (BP 1 Location: Right arm, BP Patient Position: Sitting)   Pulse 66   Temp 97.3 °F (36.3 °C) (Oral)   Resp 18   Ht 5' 6\" (1.676 m)   Wt 198 lb (89.8 kg)   SpO2 96%   BMI 31.96 kg/m²        Physical Exam   Constitutional: She is oriented to person, place, and time. She appears well-developed and well-nourished. No distress. Slightly limping while walking     HENT:   Head: Normocephalic and atraumatic. Neck: Normal range of motion. Cardiovascular: Normal rate, regular rhythm and normal heart sounds. Pulmonary/Chest: Effort normal and breath sounds normal. No respiratory distress. She has no wheezes. Abdominal: Soft. Bowel sounds are normal. She exhibits no distension. There is no tenderness. Musculoskeletal:   Left hip tenderness with straight raised leg with resistance. Also tenderness with external rotation of left hip. Nl ROM of right hip, no effusion or erythema noted. No back or knee tenderness on palpation. Strength 5/5 on b/l LE. Neurological: She is alert and oriented to person, place, and time. She has normal reflexes. Skin: Skin is warm. Psychiatric: She has a normal mood and affect. Her behavior is normal. Thought content normal.       Pertinent Labs/Studies: N/A      Assessment and orders:     Pt is 73yro F who presents for evaluation and management of left hip pain. ICD-10-CM ICD-9-CM    1. Left hip pain M25.552 719.45 XR HIP LT W OR WO PELV 2-3 VWS   2. Obesity (BMI 30.0-34. 9) E66.9 278.00      Diagnoses and all orders for this visit:    1. Left hip pain  Patient offered voltaren gel, lidocaine patch or toradol injection for pain, but declines. She prefers more conservative therapy with OTC Tylenol. Will f/u imaging result and monitor at next clinic visit. Patient knows to call back clinic in case pain worsens prior to next visit.    -     XR HIP LT W OR WO PELV 2-3 VWS; Future  -     I personally reviewed and interpreted the XR: no fracture, possible left trochanteric bursitis given soft tissue calcification of left greater trochanter. 2. Obesity (BMI 30.0-34. 9)  Patient received handout on healthy diet and regular exercise. Follow-up Disposition:  Return in about 4 weeks (around 12/28/2018) for follow-up with Dr. Gerhardt Daniels. I have reviewed patient medical and social history and medications. I have reviewed pertinent labs results and other data. I have discussed the diagnosis with the patient and the intended plan as seen in the above orders. The patient has received an after-visit summary and questions were answered concerning future plans. I have discussed medication side effects and warnings with the patient as well.     Han Valle MD  Resident [facility]  11/30/18

## 2018-12-05 ENCOUNTER — TELEPHONE (OUTPATIENT)
Dept: FAMILY MEDICINE CLINIC | Age: 73
End: 2018-12-05

## 2018-12-20 ENCOUNTER — OFFICE VISIT (OUTPATIENT)
Dept: FAMILY MEDICINE CLINIC | Age: 73
End: 2018-12-20

## 2018-12-20 VITALS
RESPIRATION RATE: 20 BRPM | BODY MASS INDEX: 31.37 KG/M2 | SYSTOLIC BLOOD PRESSURE: 106 MMHG | OXYGEN SATURATION: 97 % | TEMPERATURE: 97.3 F | HEIGHT: 66 IN | DIASTOLIC BLOOD PRESSURE: 67 MMHG | WEIGHT: 195.2 LBS | HEART RATE: 61 BPM

## 2018-12-20 DIAGNOSIS — J01.20 ACUTE NON-RECURRENT ETHMOIDAL SINUSITIS: Primary | ICD-10-CM

## 2018-12-20 DIAGNOSIS — J06.9 VIRAL UPPER RESPIRATORY TRACT INFECTION: ICD-10-CM

## 2018-12-20 LAB
QUICKVUE INFLUENZA TEST: NEGATIVE
VALID INTERNAL CONTROL?: YES

## 2018-12-20 RX ORDER — AMOXICILLIN AND CLAVULANATE POTASSIUM 875; 125 MG/1; MG/1
1 TABLET, FILM COATED ORAL EVERY 12 HOURS
Qty: 14 TAB | Refills: 0 | Status: SHIPPED | OUTPATIENT
Start: 2018-12-20 | End: 2018-12-27 | Stop reason: ALTCHOICE

## 2018-12-20 NOTE — PROGRESS NOTES
Dillan August  74 y.o. female  1945  [address] [facility]  [address] [address]  [id number]     [facility]: Progress Note       Encounter Date: 12/20/2018    Chief Complaint   Patient presents with    Cold Symptoms     cough, congestion. scratchy throat, tightness in chest     History of Present Illness   Dillan August is a 74 y.o. female who presents to clinic today for:    Cold symptoms-~5 days of cough \"cant bring up anything\", chest tightness, congestion, sore throat and sinus pain and pressure. Treatments-Amee seltzer, mucinex and ASA. Also 2 days ago 1 emesis episode. Denies-rash, body aches and chills, ear pain/pressure. Now able to tolerate food and fluids. Health Maintenance    Health Maintenance Due   Topic Date Due    Shingrix Vaccine Age 49> (1 of 2) 08/25/1993    GLAUCOMA SCREENING Q2Y  07/06/2018     Review of Systems   Review of Systems   Constitutional: Negative. HENT: Positive for congestion, sinus pain and sore throat. Eyes: Negative. Respiratory: Positive for cough. Cardiovascular: Negative. Gastrointestinal: Negative. Genitourinary: Negative. Musculoskeletal: Negative. Skin: Negative. Neurological: Negative. Endo/Heme/Allergies: Negative. Psychiatric/Behavioral: Negative. Vitals/Objective:     Vitals:    12/20/18 0815   BP: 106/67   Pulse: 61   Resp: 20   Temp: 97.3 °F (36.3 °C)   TempSrc: Oral   SpO2: 97%   Weight: 195 lb 3.2 oz (88.5 kg)   Height: 5' 6\" (1.676 m)     Body mass index is 31.51 kg/m². Physical Exam   Constitutional: She is oriented to person, place, and time. She is cooperative. HENT:   Head: Normocephalic. Nose: Right sinus exhibits maxillary sinus tenderness. Left sinus exhibits maxillary sinus tenderness. Mouth/Throat: Uvula is midline and oropharynx is clear and moist. Mucous membranes are dry. Eyes: Conjunctivae are normal. Pupils are equal, round, and reactive to light.  Lids are everted and swept, no foreign bodies found. Neck: Trachea normal, normal range of motion, full passive range of motion without pain and phonation normal. Neck supple. No thyromegaly present. Cardiovascular: Normal rate, regular rhythm, normal heart sounds and normal pulses. Pulmonary/Chest: Effort normal. She has rhonchi in the right upper field, the right lower field, the left upper field and the left lower field. Rhonchi noted on inspiratory. Musculoskeletal: Normal range of motion. Lymphadenopathy:     She has no cervical adenopathy. Neurological: She is alert and oriented to person, place, and time. Skin: Skin is warm, dry and intact. Psychiatric: She has a normal mood and affect. Her speech is normal and behavior is normal. Judgment and thought content normal. Cognition and memory are normal.       Recent Results (from the past 24 hour(s))   AMB POC RAPID INFLUENZA TEST    Collection Time: 12/20/18  8:49 AM   Result Value Ref Range    VALID INTERNAL CONTROL POC Yes     QuickVue Influenza test Negative Negative     Assessment and Plan:   1. Acute non-recurrent ethmoidal sinusitis    - amoxicillin-clavulanate (AUGMENTIN) 875-125 mg per tablet; Take 1 Tab by mouth every twelve (12) hours for 7 days. Dispense: 14 Tab; Refill: 0    2. Viral upper respiratory tract infection    - AMB POC RAPID INFLUENZA TEST    Reviewed patient's medications and allergies. Augmentin prescribed; discussed supportive therapy with mucinex, increasing fluids to thin secretions and also for volume and dry membranes, rest, tylenol/ibuprofen as needed. Also discussed hand hygiene. I have discussed the diagnosis with the patient and the intended plan as seen in the above orders. she has expressed understanding. The patient has received an after-visit summary and questions were answered concerning future plans. I have discussed medication side effects and warnings with the patient as well.     Electronically Signed: Alea Small Evelina Pickett NP     History/Allergies   Patients past medical, surgical and family histories were reviewed and updated. Past Medical History:   Diagnosis Date    Asthma 4/5/2010    Bronchitis, mucopurulent recurrent ([facility] 5/14/2010    CVA (cerebral vascular accident) ([facility] 4/5/2010    Hypercholesteremia 4/5/2010      Past Surgical History:   Procedure Laterality Date    HX HYSTERECTOMY      HX TONSILLECTOMY       Family History   Problem Relation Age of Onset    Stroke Mother     Heart Disease Father      Social History     Socioeconomic History    Marital status:      Spouse name: Not on file    Number of children: Not on file    Years of education: Not on file    Highest education level: Not on file   Social Needs    Financial resource strain: Not on file    Food insecurity - worry: Not on file    Food insecurity - inability: Not on file   [employer] needs - medical: Not on file   [employer] needs - non-medical: Not on file   Occupational History    Not on file   Tobacco Use    Smoking status: Never Smoker    Smokeless tobacco: Never Used   Substance and Sexual Activity    Alcohol use: No    Drug use: No    Sexual activity: Not on file   Other Topics Concern    Not on file   Social History Narrative    Not on file         No Known Allergies    Disposition     Follow-up Disposition:  Return if symptoms worsen or fail to improve. Future Appointments   Date Time Provider Jero Glez   12/31/2018  9:50 AM Jovanni Abrahma MD [facility] SCHED   2/12/2019  1:25 PM MD BRENNEN Lawrence [facility] [address] [place] [address] Road   2/28/2019  8:00 AM MD BRENNEN Lawrence [facility] SCHED            Current Medications after this visit     Current Outpatient Medications   Medication Sig    amoxicillin-clavulanate (AUGMENTIN) 875-125 mg per tablet Take 1 Tab by mouth every twelve (12) hours for 7 days.     varicella-zoster gE vac,2 of 2 50 mcg susr Shingrix, one injection now and repeat in 4-6 months. To be administered at Pharmacy. Fax confirmation to me at [phone].  omeprazole (PRILOSEC) 20 mg capsule TAKE ONE CAPSULE BY MOUTH EVERY DAY    atorvastatin (LIPITOR) 40 mg tablet TAKE ONE TABLET BY MOUTH EVERY DAY *STOP NIACIN*    omega-3 fatty acids-vitamin e (FISH OIL) 1,000 mg cap Take 1 Cap by mouth two (2) times a day.  aspirin delayed-release 81 mg tablet Take 81 mg by mouth daily.  CALCIUM CARBONATE (ISAI-600 PO) Take  by mouth.  Cholecalciferol, Vitamin D3, (VITAMIN D) 2,000 unit Cap Take  by mouth.  VITAMIN B COMPLEX (VITAMIN B-100 COMPLEX PO) Take  by mouth.  trimethoprim-sulfamethoxazole (BACTRIM, SEPTRA)  mg per tablet TK 2 TS PO Q 12 H UNTIL GONE     No current facility-administered medications for this visit. There are no discontinued medications.

## 2018-12-20 NOTE — PROGRESS NOTES
Chief Complaint   Patient presents with    Cold Symptoms     cough, congestion. scratchy throat, tightness in chest     Visit Vitals  /67 (BP 1 Location: Left arm, BP Patient Position: Sitting)   Pulse 61   Temp 97.3 °F (36.3 °C) (Oral)   Resp 20   Ht 5' 6\" (1.676 m)   Wt 195 lb 3.2 oz (88.5 kg)   SpO2 97%   BMI 31.51 kg/m²     1. Have you been to the ER, urgent care clinic since your last visit? Hospitalized since your last visit? No    2. Have you seen or consulted any other health care providers outside of the [address] [place] [address] since your last visit? Include any pap smears or colon screening.  No    Reviewed record in preparation for visit and have necessary documentation  Pt did not bring medication to office visit for review  opportunity was given for questions  Goals that were addressed and/or need to be completed during or after this appointment include   Health Maintenance Due   Topic Date Due    Shingrix Vaccine Age 50> (1 of 2) 08/25/1993    GLAUCOMA SCREENING Q2Y  07/06/2018

## 2018-12-20 NOTE — PATIENT INSTRUCTIONS
Sinusitis: Care Instructions  Your Care Instructions    Sinusitis is an infection of the lining of the sinus cavities in your head. Sinusitis often follows a cold. It causes pain and pressure in your head and face. In most cases, sinusitis gets better on its own in 1 to 2 weeks. But some mild symptoms may last for several weeks. Sometimes antibiotics are needed. Follow-up care is a key part of your treatment and safety. Be sure to make and go to all appointments, and call your doctor if you are having problems. It's also a good idea to know your test results and keep a list of the medicines you take. How can you care for yourself at home? · Take an over-the-counter pain medicine, such as acetaminophen (Tylenol), ibuprofen (Advil, Motrin), or naproxen (Aleve). Read and follow all instructions on the label. · If the doctor prescribed antibiotics, take them as directed. Do not stop taking them just because you feel better. You need to take the full course of antibiotics. · Be careful when taking over-the-counter cold or flu medicines and Tylenol at the same time. Many of these medicines have acetaminophen, which is Tylenol. Read the labels to make sure that you are not taking more than the recommended dose. Too much acetaminophen (Tylenol) can be harmful. · Breathe warm, moist air from a steamy shower, a hot bath, or a sink filled with hot water. Avoid cold, dry air. Using a humidifier in your home may help. Follow the directions for cleaning the machine. · Use saline (saltwater) nasal washes to help keep your nasal passages open and wash out mucus and bacteria. You can buy saline nose drops at a grocery store or drugstore. Or you can make your own at home by adding 1 teaspoon of salt and 1 teaspoon of baking soda to 2 cups of distilled water. If you make your own, fill a bulb syringe with the solution, insert the tip into your nostril, and squeeze gently. Sandra Luther your nose.   · Put a hot, wet towel or a warm gel pack on your face 3 or 4 times a day for 5 to 10 minutes each time. · Try a decongestant nasal spray like oxymetazoline (Afrin). Do not use it for more than 3 days in a row. Using it for more than 3 days can make your congestion worse. When should you call for help? Call your doctor now or seek immediate medical care if:    · You have new or worse swelling or redness in your face or around your eyes.     · You have a new or higher fever.    Watch closely for changes in your health, and be sure to contact your doctor if:    · You have new or worse facial pain.     · The mucus from your nose becomes thicker (like pus) or has new blood in it.     · You are not getting better as expected. Where can you learn more? Go to http://chuck-hank.info/. Enter B571 in the search box to learn more about \"Sinusitis: Care Instructions. \"  Current as of: March 28, 2018  Content Version: 11.8  © 7653-7172 [employer]. Care instructions adapted under license by [employer] (which disclaims liability or warranty for this information). If you have questions about a medical condition or this instruction, always ask your healthcare professional. [place] [address] any warranty or liability for your use of this information.

## 2018-12-27 ENCOUNTER — OFFICE VISIT (OUTPATIENT)
Dept: FAMILY MEDICINE CLINIC | Age: 73
End: 2018-12-27

## 2018-12-27 VITALS
BODY MASS INDEX: 31.63 KG/M2 | SYSTOLIC BLOOD PRESSURE: 115 MMHG | HEART RATE: 62 BPM | TEMPERATURE: 97.4 F | OXYGEN SATURATION: 98 % | RESPIRATION RATE: 18 BRPM | DIASTOLIC BLOOD PRESSURE: 70 MMHG | HEIGHT: 66 IN | WEIGHT: 196.8 LBS

## 2018-12-27 DIAGNOSIS — H93.8X3 EAR PRESSURE, BILATERAL: ICD-10-CM

## 2018-12-27 DIAGNOSIS — R05.9 COUGH IN ADULT: Primary | ICD-10-CM

## 2018-12-27 DIAGNOSIS — J34.89 SINUS PAIN: ICD-10-CM

## 2018-12-27 RX ORDER — AZITHROMYCIN 250 MG/1
TABLET, FILM COATED ORAL
Qty: 6 TAB | Refills: 0 | Status: SHIPPED | OUTPATIENT
Start: 2018-12-27 | End: 2019-01-01

## 2018-12-27 NOTE — PROGRESS NOTES
Paul Gonzalez  74 y.o. female  1945  [id number] [address]  [address] [address]  [id number]     [facility]: Progress Note       Encounter Date: 12/27/2018    Chief Complaint   Patient presents with    Cold Symptoms     cough, congestion     History of Present Illness   Paul Gonzalez is a 74 y.o. female who presents to clinic today for:    Continued cold symptoms- lingering productive cough (white in color) and chest congestion. Also complains of her ears being clogged and feels like \"I have balloon in my head\". Tolerating food and fluids. Denies-rash, sore throat, fevers. Continued 7 days of Augmentin and feels slightly better. OTC-mucinex and robitussin cough syrup. Health Maintenance    Health Maintenance Due   Topic Date Due    Shingrix Vaccine Age 49> (1 of 2) 08/25/1993    GLAUCOMA SCREENING Q2Y  07/06/2018     Review of Systems   Review of Systems   Constitutional: Negative. HENT: Positive for congestion. Clogged/fullness ear feeling. Eyes: Negative. Respiratory: Positive for cough and sputum production. Cardiovascular: Negative. Gastrointestinal: Negative. Genitourinary: Negative. Musculoskeletal: Negative. Skin: Negative. Neurological: Negative. Endo/Heme/Allergies: Negative. Psychiatric/Behavioral: Negative. Vitals/Objective:     Vitals:    12/27/18 0915 12/27/18 0936   BP: 115/70    Pulse: 62    Resp: 18    Temp: 97.4 °F (36.3 °C)    TempSrc: Oral    SpO2: 94% 98%   Weight: 196 lb 12.8 oz (89.3 kg)    Height: 5' 6\" (1.676 m)      Body mass index is 31.76 kg/m². Physical Exam   Constitutional: She is oriented to person, place, and time. She is cooperative. HENT:   Right Ear: Hearing, tympanic membrane, external ear and ear canal normal.   Left Ear: Hearing, tympanic membrane, external ear and ear canal normal.   Nose: Mucosal edema present.    Mouth/Throat: Uvula is midline, oropharynx is clear and moist and mucous membranes are normal.   Eyes: Conjunctivae and lids are normal. Pupils are equal, round, and reactive to light. Neck: Trachea normal, normal range of motion, full passive range of motion without pain and phonation normal. Neck supple. Cardiovascular: Normal rate, regular rhythm, normal heart sounds and normal pulses. Pulmonary/Chest: Effort normal and breath sounds normal.   Musculoskeletal: Normal range of motion. Lymphadenopathy:     She has no cervical adenopathy. Neurological: She is alert and oriented to person, place, and time. Skin: Skin is warm, dry and intact. Psychiatric: She has a normal mood and affect. Her speech is normal and behavior is normal. Judgment and thought content normal. Cognition and memory are normal.       No results found for this or any previous visit (from the past 24 hour(s)). Assessment and Plan:   1. Cough in adult    - azithromycin (ZITHROMAX) 250 mg tablet; Take 2 tablets today, then take 1 tablet daily  Dispense: 6 Tab; Refill: 0    2. Sinus pain      3. Ear pressure, bilateral      Unfortunately xray is not available in the clinic today. Will provide a pocket prescription due to the holidays. Advised patient to continue to push fluids to thin secretions, rest, tylenol/ibuprofen as needed. Also advised to continue the mucinex and buy OTC zyrtec and flonase for sinus and ear pain/pressure. Discussed ED parameters to r/o pneumonia. I have discussed the diagnosis with the patient and the intended plan as seen in the above orders. she has expressed understanding. The patient has received an after-visit summary and questions were answered concerning future plans. I have discussed medication side effects and warnings with the patient as well. Electronically Signed: Tianna Payne NP     History/Allergies   Patients past medical, surgical and family histories were reviewed and updated.     Past Medical History:   Diagnosis Date    Asthma 4/5/2010    [facility], mucopurulent recurrent ([facility] 5/14/2010    CVA (cerebral vascular accident) ([facility] 4/5/2010    Hypercholesteremia 4/5/2010      Past Surgical History:   Procedure Laterality Date    HX HYSTERECTOMY      HX TONSILLECTOMY       Family History   Problem Relation Age of Onset    Stroke Mother     Heart Disease Father      Social History     Socioeconomic History    Marital status:      Spouse name: Not on file    Number of children: Not on file    Years of education: Not on file    Highest education level: Not on file   Social Needs    Financial resource strain: Not on file    Food insecurity - worry: Not on file    Food insecurity - inability: Not on file    Transportation needs - medical: Not on file   Applits needs - non-medical: Not on file   Occupational History    Not on file   Tobacco Use    Smoking status: Never Smoker    Smokeless tobacco: Never Used   Substance and Sexual Activity    Alcohol use: No    Drug use: No    Sexual activity: Not on file   Other Topics Concern    Not on file   Social History Narrative    Not on file         No Known Allergies    Disposition     Follow-up Disposition: Not on File    Future Appointments   Date Time Provider Jero Glez   1/4/2019  9:30 AM MD BRENNEN Lawrence [facility] SCHED   2/12/2019  1:25 PM MD BRENNEN Lawrence [place] SCHED   2/28/2019  8:00 AM MD CARLOS LawrenceLUIS MANUEL [place] SCHED            Current Medications after this visit     Current Outpatient Medications   Medication Sig    azithromycin (ZITHROMAX) 250 mg tablet Take 2 tablets today, then take 1 tablet daily    omeprazole (PRILOSEC) 20 mg capsule TAKE ONE CAPSULE BY MOUTH EVERY DAY    atorvastatin (LIPITOR) 40 mg tablet TAKE ONE TABLET BY MOUTH EVERY DAY *STOP NIACIN*    omega-3 fatty acids-vitamin e (FISH OIL) 1,000 mg cap Take 1 Cap by mouth two (2) times a day.  aspirin delayed-release 81 mg tablet Take 81 mg by mouth daily.     CALCIUM CARBONATE (ISAI-600 PO) Take  by mouth.  Cholecalciferol, Vitamin D3, (VITAMIN D) 2,000 unit Cap Take  by mouth.  VITAMIN B COMPLEX (VITAMIN B-100 COMPLEX PO) Take  by mouth.  amoxicillin-clavulanate (AUGMENTIN) 875-125 mg per tablet Take 1 Tab by mouth every twelve (12) hours for 7 days.  varicella-zoster gE vac,2 of 2 50 mcg susr Shingrix, one injection now and repeat in 4-6 months. To be administered at Pharmacy. Fax confirmation to me at [phone]. No current facility-administered medications for this visit. There are no discontinued medications.

## 2018-12-27 NOTE — PROGRESS NOTES
Chief Complaint   Patient presents with    Cold Symptoms     cough, congestion       Visit Vitals  /70 (BP 1 Location: Left arm, BP Patient Position: Sitting)   Pulse 62   Temp 97.4 °F (36.3 °C) (Oral)   Resp 18   Ht 5' 6\" (1.676 m)   Wt 196 lb 12.8 oz (89.3 kg)   SpO2 94%   BMI 31.76 kg/m²     1. Have you been to the ER, urgent care clinic since your last visit? Hospitalized since your last visit? No    2. Have you seen or consulted any other health care providers outside of the [address] [place] [address] since your last visit? Include any pap smears or colon screening.  No    Reviewed record in preparation for visit and have necessary documentation  Pt did not bring medication to office visit for review  opportunity was given for questions  Goals that were addressed and/or need to be completed during or after this appointment include   Health Maintenance Due   Topic Date Due    Shingrix Vaccine Age 50> (1 of 2) 08/25/1993    GLAUCOMA SCREENING Q2Y  07/06/2018

## 2018-12-27 NOTE — PATIENT INSTRUCTIONS
Cough: Care Instructions  Your Care Instructions    A cough is your body's response to something that bothers your throat or airways. Many things can cause a cough. You might cough because of a cold or the flu, bronchitis, or asthma. Smoking, postnasal drip, allergies, and stomach acid that backs up into your throat also can cause coughs. A cough is a symptom, not a disease. Most coughs stop when the cause, such as a cold, goes away. You can take a few steps at home to cough less and feel better. Follow-up care is a key part of your treatment and safety. Be sure to make and go to all appointments, and call your doctor if you are having problems. It's also a good idea to know your test results and keep a list of the medicines you take. How can you care for yourself at home? · Drink lots of water and other fluids. This helps thin the mucus and soothes a dry or sore throat. Honey or lemon juice in hot water or tea may ease a dry cough. · Take cough medicine as directed by your doctor. · Prop up your head on pillows to help you breathe and ease a dry cough. · Try cough drops to soothe a dry or sore throat. Cough drops don't stop a cough. Medicine-flavored cough drops are no better than candy-flavored drops or hard candy. · Do not smoke. Avoid secondhand smoke. If you need help quitting, talk to your doctor about stop-smoking programs and medicines. These can increase your chances of quitting for good. When should you call for help? Call 911 anytime you think you may need emergency care.  For example, call if:    · You have severe trouble breathing.    Call your doctor now or seek immediate medical care if:    · You cough up blood.     · You have new or worse trouble breathing.     · You have a new or higher fever.     · You have a new rash.    Watch closely for changes in your health, and be sure to contact your doctor if:    · You cough more deeply or more often, especially if you notice more mucus or a change in the color of your mucus.     · You have new symptoms, such as a sore throat, an earache, or sinus pain.     · You do not get better as expected. Where can you learn more? Go to http://chuck-hank.info/. Enter D279 in the search box to learn more about \"Cough: Care Instructions. \"  Current as of: December 6, 2017  Content Version: 11.8  © 1344-7907 [employer]. Care instructions adapted under license by [employer] (which disclaims liability or warranty for this information). If you have questions about a medical condition or this instruction, always ask your healthcare professional. [place] [address] any warranty or liability for your use of this information. Continue-mucinex twice a day. Buy over the counter-Zyrtec 10mg at night and flonase nasal spray-2 sprays each nostril once a day.

## 2019-01-04 ENCOUNTER — OFFICE VISIT (OUTPATIENT)
Dept: FAMILY MEDICINE CLINIC | Age: 74
End: 2019-01-04

## 2019-01-04 VITALS
WEIGHT: 199.6 LBS | RESPIRATION RATE: 20 BRPM | BODY MASS INDEX: 32.08 KG/M2 | SYSTOLIC BLOOD PRESSURE: 125 MMHG | DIASTOLIC BLOOD PRESSURE: 77 MMHG | HEIGHT: 66 IN | TEMPERATURE: 97.7 F | OXYGEN SATURATION: 96 % | HEART RATE: 56 BPM

## 2019-01-04 DIAGNOSIS — J34.89 SINUS PAIN: Primary | ICD-10-CM

## 2019-01-04 DIAGNOSIS — J01.00 ACUTE MAXILLARY SINUSITIS, RECURRENCE NOT SPECIFIED: ICD-10-CM

## 2019-01-04 RX ORDER — CEFUROXIME AXETIL 500 MG/1
500 TABLET ORAL 2 TIMES DAILY
Qty: 20 TAB | Refills: 0 | Status: SHIPPED | OUTPATIENT
Start: 2019-01-04 | End: 2019-01-14

## 2019-01-04 NOTE — PROGRESS NOTES
[address]  [phone]           Progress Note    Patient: Mao  MRN: [id number]  SSN: [id number]    YOB: 1945  Age: 74 y.o. Sex: female        Chief Complaint   Patient presents with    Nasal Congestion    Headache         Subjective:     Encounter Diagnoses   Name Primary?  Sinus pain: She took a course of Augmentin for sinus infection back in December. She followed up for persistent symptoms but did not take the Z-Geraldo as recommended. She returns today for persistent symptoms of sinusitis which includes congestion facial discomfort rhinorrhea but no fever chills or sweats. Yes    Acute maxillary sinusitis, recurrence not specified: Extremities reveal persistent maxillary sinusitis. With her degree of congestion I recommended 5 days worth of prednisone but she declined because it makes her appetite increased so much. Duration of symptoms:   Nasal discharge color: Yellow   Ear ache, face pain, headache or facial swelling:   Fever: No   Chills: No   Sweats: No   Associated Cough: Associated cough has resolved. Wheezing: No   Smoker: No          Current and past medical information:    Current Medications after this visit[de-identified]     Current Outpatient Medications   Medication Sig    cefUROXime (CEFTIN) 500 mg tablet Take 1 Tab by mouth two (2) times a day for 10 days.  omeprazole (PRILOSEC) 20 mg capsule TAKE ONE CAPSULE BY MOUTH EVERY DAY    atorvastatin (LIPITOR) 40 mg tablet TAKE ONE TABLET BY MOUTH EVERY DAY *STOP NIACIN*    omega-3 fatty acids-vitamin e (FISH OIL) 1,000 mg cap Take 1 Cap by mouth two (2) times a day.  aspirin delayed-release 81 mg tablet Take 81 mg by mouth daily.  CALCIUM CARBONATE (ISAI-600 PO) Take  by mouth.  Cholecalciferol, Vitamin D3, (VITAMIN D) 2,000 unit Cap Take  by mouth.  VITAMIN B COMPLEX (VITAMIN B-100 COMPLEX PO) Take  by mouth.     varicella-zoster gE vac,2 of 2 50 mcg susr Shingrix, one injection now and repeat in 4-6 months. To be administered at Pharmacy. Fax confirmation to me at [phone]. No current facility-administered medications for this visit. Patient Active Problem List    Diagnosis Date Noted    Vitamin D deficiency 01/07/2014     Priority: 1 - One    Hypercholesteremia 04/05/2010     Priority: 1 - One    CVA (cerebral vascular accident) ([address] 04/05/2010     Priority: 1 - One    Asthma 04/05/2010     Priority: 1 - One    Cataract, nuclear sclerotic, left eye 07/10/2017    Dermatochalasis of left upper eyelid 07/10/2017    Dermatochalasis of right upper eyelid 07/10/2017    PCO (posterior capsular opacification), right 07/10/2017    Pseudophakia of right eye 07/10/2017    Arthritis pain 12/09/2014    Bronchitis, mucopurulent recurrent ([address] 05/14/2010       Past Medical History:   Diagnosis Date    Asthma 4/5/2010    Bronchitis, mucopurulent recurrent ([address] 5/14/2010    CVA (cerebral vascular accident) ([address] 4/5/2010    Hypercholesteremia 4/5/2010       No Known Allergies    Past Surgical History:   Procedure Laterality Date    HX HYSTERECTOMY      HX TONSILLECTOMY         Social History     Socioeconomic History    Marital status:      Spouse name: Not on file    Number of children: Not on file    Years of education: Not on file    Highest education level: Not on file   Tobacco Use    Smoking status: Never Smoker    Smokeless tobacco: Never Used   Substance and Sexual Activity    Alcohol use: No    Drug use: No       Review of Systems   Constitutional: Positive for malaise/fatigue. Negative for chills, fever and weight loss. HENT: Positive for congestion, ear pain, hearing loss and sinus pain. Eyes: Negative. Negative for blurred vision and double vision. Respiratory: Negative. Negative for cough, hemoptysis, sputum production and shortness of breath.     Cardiovascular: Negative. Negative for chest pain, palpitations and orthopnea. Gastrointestinal: Negative. Negative for abdominal pain, blood in stool, heartburn, nausea and vomiting. Genitourinary: Negative. Negative for dysuria, frequency and urgency. Musculoskeletal: Negative. Negative for back pain, myalgias and neck pain. Skin: Negative. Negative for rash. Neurological: Negative. Negative for dizziness, tingling, tremors, weakness and headaches. Endo/Heme/Allergies: Negative. Psychiatric/Behavioral: Negative. Negative for depression. Objective:     Vitals:    01/04/19 0848   BP: 125/77   Pulse: (!) 56   Resp: 20   Temp: 97.7 °F (36.5 °C)   TempSrc: Oral   SpO2: 96%   Weight: 199 lb 9.6 oz (90.5 kg)   Height: 5' 6\" (1.676 m)      Body mass index is 32.22 kg/m². Physical Exam   Constitutional: She is oriented to person, place, and time and well-developed, well-nourished, and in no distress. No distress. HENT:   Head: Normocephalic and atraumatic. Mouth/Throat: Oropharynx is clear and moist.   She has a retracted tympanic membrane on the left. She has discomfort in the right ear. This is consistent with eustachian tube dysfunction. She is very congested. If she has tenderness over her maxillary sinuses. Eyes: Conjunctivae are normal.   She has puffiness around her eyes. Neck: No thyromegaly present. Cardiovascular: Normal rate, regular rhythm and normal heart sounds. No murmur heard. Pulmonary/Chest: Effort normal and breath sounds normal. No respiratory distress. She has no wheezes. She has no rales. She no longer has a cough. Abdominal: Soft. She exhibits no distension. Lymphadenopathy:     She has no cervical adenopathy. Neurological: She is alert and oriented to person, place, and time. Skin: Skin is warm. No rash noted. She is not diaphoretic. No erythema. Psychiatric: Mood and affect normal.   Nursing note and vitals reviewed.         Health Maintenance Due Topic Date Due    Shingrix Vaccine Age 50> (1 of 2) 08/25/1993    GLAUCOMA SCREENING Q2Y  07/06/2018         Assessment and orders:     Encounter Diagnoses     ICD-10-CM ICD-9-CM   1. Sinus pain-persistent J34.89 478.19   2. Acute maxillary sinusitis, recurrence not specified J01.00 461.0     Diagnoses and all orders for this visit:    1. Sinus pain-due to infection  -     XR SINUSES PARANASAL MAX 2 V; Future    2. Acute maxillary sinusitis, recurrence not specified-this is more of a persistent infection. She has bilateral air-fluid levels in the maxillary sinuses. He did not want to take prednisone along with the new antibiotic. I recommended she use Flonase and tilt her head back into the side when using it to improve distribution around the eustachian tube since she has eustachian tube dysfunction. He will return as needed. -     XR SINUSES PARANASAL MAX 2 V; Future  -     cefUROXime (CEFTIN) 500 mg tablet; Take 1 Tab by mouth two (2) times a day for 10 days. Plan of care:  Discussed diagnoses in detail with patient. Medication risks/benefits/side effects discussed with patient. All of the patient's questions were addressed. The patient understands and agrees with our plan of care. The patient knows to call back if they are unsure of or forget any changes we discussed today or if the symptoms change. The patient received an After-Visit Summary which contains VS, orders, medication list and allergy list. This can be used as a \"mini-medical record\" should they have to seek medical care while out of town. Patient Care Team:  Mendel Numbers, MD as PCP - Christina Bansal MD (Ophthalmology)    Follow-up Disposition:  Return if symptoms worsen or fail to improve.     Future Appointments   Date Time Provider Jero Glez   2/12/2019  1:25 PM Mendel Numbers, MD [facility] SCHED   2/20/2019  8:00 AM Mendel Numbers, MD [facility] SCHED       Signed By: Cathy Cisneros MD     January 4, 2019        This note was created using voice recognition software. Despite editing, there may be syntax errors.

## 2019-01-04 NOTE — PROGRESS NOTES
Chief Complaint   Patient presents with    Nasal Congestion    Headache     Body mass index is 32.22 kg/m². 1. Have you been to the ER, urgent care clinic since your last visit? Hospitalized since your last visit? No    2. Have you seen or consulted any other health care providers outside of the [address] [place] [address] since your last visit? Include any pap smears or colon screening.  No    Reviewed record in preparation for visit and have necessary documentation  Pt did not bring medication to office visit for review  Information was given to pt on Advanced Directives, Living Will  Information was given on Shingles Vaccine  Opportunity was given for questions  Goals that were addressed and/or need to be completed after this appointment include:     Health Maintenance Due   Topic Date Due    Shingrix Vaccine Age 50> (1 of 2) 08/25/1993    GLAUCOMA SCREENING Q2Y  07/06/2018

## 2019-01-04 NOTE — PATIENT INSTRUCTIONS
Sinusitis: Care Instructions  Your Care Instructions    Sinusitis is an infection of the lining of the sinus cavities in your head. Sinusitis often follows a cold. It causes pain and pressure in your head and face. In most cases, sinusitis gets better on its own in 1 to 2 weeks. But some mild symptoms may last for several weeks. Sometimes antibiotics are needed. Follow-up care is a key part of your treatment and safety. Be sure to make and go to all appointments, and call your doctor if you are having problems. It's also a good idea to know your test results and keep a list of the medicines you take. How can you care for yourself at home? · Take an over-the-counter pain medicine, such as acetaminophen (Tylenol), ibuprofen (Advil, Motrin), or naproxen (Aleve). Read and follow all instructions on the label. · If the doctor prescribed antibiotics, take them as directed. Do not stop taking them just because you feel better. You need to take the full course of antibiotics. · Be careful when taking over-the-counter cold or flu medicines and Tylenol at the same time. Many of these medicines have acetaminophen, which is Tylenol. Read the labels to make sure that you are not taking more than the recommended dose. Too much acetaminophen (Tylenol) can be harmful. · Breathe warm, moist air from a steamy shower, a hot bath, or a sink filled with hot water. Avoid cold, dry air. Using a humidifier in your home may help. Follow the directions for cleaning the machine. · Use saline (saltwater) nasal washes to help keep your nasal passages open and wash out mucus and bacteria. You can buy saline nose drops at a grocery store or drugstore. Or you can make your own at home by adding 1 teaspoon of salt and 1 teaspoon of baking soda to 2 cups of distilled water. If you make your own, fill a bulb syringe with the solution, insert the tip into your nostril, and squeeze gently. [place] Hashimoto your nose.   · Put a hot, wet towel or a warm gel pack on your face 3 or 4 times a day for 5 to 10 minutes each time. · Try a decongestant nasal spray like oxymetazoline (Afrin). Do not use it for more than 3 days in a row. Using it for more than 3 days can make your congestion worse. When should you call for help? Call your doctor now or seek immediate medical care if:    · You have new or worse swelling or redness in your face or around your eyes.     · You have a new or higher fever.    Watch closely for changes in your health, and be sure to contact your doctor if:    · You have new or worse facial pain.     · The mucus from your nose becomes thicker (like pus) or has new blood in it.     · You are not getting better as expected. Where can you learn more? Go to http://chuck-hank.info/. Enter D811 in the search box to learn more about \"Sinusitis: Care Instructions. \"  Current as of: March 28, 2018  Content Version: 11.8  © 9641-4158 [employer]. Care instructions adapted under license by "[employer] (which disclaims liability or warranty for this information). If you have questions about a medical condition or this instruction, always ask your healthcare professional. [place] [address] any warranty or liability for your use of this information.

## 2019-02-12 ENCOUNTER — OFFICE VISIT (OUTPATIENT)
Dept: FAMILY MEDICINE CLINIC | Age: 74
End: 2019-02-12

## 2019-02-12 VITALS
TEMPERATURE: 98.2 F | OXYGEN SATURATION: 95 % | HEART RATE: 63 BPM | SYSTOLIC BLOOD PRESSURE: 104 MMHG | WEIGHT: 199.2 LBS | BODY MASS INDEX: 32.02 KG/M2 | DIASTOLIC BLOOD PRESSURE: 69 MMHG | RESPIRATION RATE: 20 BRPM | HEIGHT: 66 IN

## 2019-02-12 DIAGNOSIS — J01.01 ACUTE RECURRENT MAXILLARY SINUSITIS: ICD-10-CM

## 2019-02-12 DIAGNOSIS — Z00.00 MEDICARE ANNUAL WELLNESS VISIT, SUBSEQUENT: Primary | ICD-10-CM

## 2019-02-12 RX ORDER — FLUTICASONE PROPIONATE 50 MCG
SPRAY, SUSPENSION (ML) NASAL
COMMUNITY
Start: 2019-02-09 | End: 2021-09-20

## 2019-02-12 RX ORDER — AMOXICILLIN AND CLAVULANATE POTASSIUM 875; 125 MG/1; MG/1
1 TABLET, FILM COATED ORAL 2 TIMES DAILY
COMMUNITY
Start: 2019-02-09 | End: 2019-02-22 | Stop reason: ALTCHOICE

## 2019-02-12 NOTE — PATIENT INSTRUCTIONS

## 2019-02-12 NOTE — PROGRESS NOTES
[facility]  2005 [facility], [address] [place] [address]             Date of visit: 2/12/2019       This is a Subsequent Medicare Annual Wellness Visit (AWV), (Performed more than 12 months after effective date of Medicare Part B enrollment and 12 months after last wellness visit)    I have reviewed the patient's medical history in detail and updated the computerized patient record. Tammie Boswell is a 74 y.o. female   History obtained from: the patient. Concerns today   (Patient understands that medical problems addressed today may incur additional notes andcost as this is a preventive visit)      History     Patient Active Problem List   Diagnosis Code    Hypercholesteremia E78.00    CVA (cerebral vascular accident) ([address] I63.9    Asthma J45.909    Bronchitis, mucopurulent recurrent ([address] J41.1    Vitamin D deficiency E55.9    Arthritis pain M19.90    Cataract, nuclear sclerotic, left eye H25.12    Dermatochalasis of left upper eyelid H02.834    Dermatochalasis of right upper eyelid H02.831    PCO (posterior capsular opacification), right H26.491    Pseudophakia of right eye Z96.1     Past Medical History:   Diagnosis Date    Asthma 4/5/2010    Bronchitis, mucopurulent recurrent ([address] 5/14/2010    CVA (cerebral vascular accident) ([facility] 4/5/2010    Hypercholesteremia 4/5/2010      Past Surgical History:   Procedure Laterality Date    HX HYSTERECTOMY      HX TONSILLECTOMY       No Known Allergies  Current Outpatient Medications   Medication Sig Dispense Refill    fluticasone (FLONASE) 50 mcg/actuation nasal spray       omeprazole (PRILOSEC) 20 mg capsule TAKE ONE CAPSULE BY MOUTH EVERY DAY 90 Cap 1    atorvastatin (LIPITOR) 40 mg tablet TAKE ONE TABLET BY MOUTH EVERY DAY *STOP NIACIN* 90 Tab 1    omega-3 fatty acids-vitamin e (FISH OIL) 1,000 mg cap Take 1 Cap by mouth two (2) times a day.       aspirin delayed-release 81 mg tablet Take 81 mg by mouth daily.  CALCIUM CARBONATE (ISAI-600 PO) Take  by mouth.  Cholecalciferol, Vitamin D3, (VITAMIN D) 2,000 unit Cap Take  by mouth.  VITAMIN B COMPLEX (VITAMIN B-100 COMPLEX PO) Take  by mouth.  amoxicillin-clavulanate (AUGMENTIN) 875-125 mg per tablet Take 1 Tab by mouth two (2) times a day.  varicella-zoster gE vac,2 of 2 50 mcg susr Shingrix, one injection now and repeat in 4-6 months. To be administered at Pharmacy. Fax confirmation to me at [phone]. 2 Each 0     Family History   Problem Relation Age of Onset    Stroke Mother     Heart Disease Father      Social History     Tobacco Use    Smoking status: Never Smoker    Smokeless tobacco: Never Used   Substance Use Topics    Alcohol use: No       Specialists/Care Team   Sunitha Pak has established care with the following healthcare providers:  Patient Care Team:  Francesca Tompkins MD as PCP - Anne Grider MD (Ophthalmology)      Health Risk Assessment     Demographics   female  74 y.o. General Health Questions   -During the past 4 weeks:   -how would you rate your health in general? Good   -how often have you been bothered by feeling dizzy when standing up? never   -how much have you been bothered by bodily pain? not   -Have you noticed any hearing difficulties? Yes, sinusitis and ETD   -has your physical and emotional health limited your social activities with family or friends? no    Emotional Health Questions   -Do you have a history of depression, anxiety, or emotional problems? no  -Over the past 2 weeks, have you felt down, depressed or hopeless? no  -Over the past 2 weeks, have you felt little interest or pleasure in doing things? no    Health Habits   Please describe your diet habits: self grade B  Do you get 5 servings of fruits or vegetables daily? no  Do you exercise regularly? yes    Alcohol Risk Factor Screening:    On any occasion during the past 3 months, have you had more than 4 drinks containing alcohol? No   Do you average more than 14 drinks per week? No     Activities of Daily Living and Functional Status   -Do you need help with eating, walking, dressing, bathing, toileting, the phone, transportation, shopping, preparing meals, housework, laundry, medications or managing money? no  -In the past four weeks, was someone available to help you if you needed and wanted help with anything? yes  -Are you confident are you that you can control and manage most of your health problems? yes  -Have you been given information to help you keep track of your medications? yes  -How often do you have trouble taking your medications as prescribed? occasionally    Fall Risk and Home Safety   Have you fallen 2 or more times in the past year? no  Does your home have rugs in the hallway, lack grab bars in the bathroom, lack handrails on the stairs or have poor lighting? no  Do you have smoke detectors and check them regularly? yes  Do you have difficulties driving a car? no  Do you always fasten your seat belt when you are in a car? yes    Review of Systems (if indicated for problems addressed today)   Review of Systems   Constitutional: Negative. Negative for chills, fever, malaise/fatigue and weight loss. HENT: Positive for congestion and sinus pain. Negative for hearing loss. Eyes: Negative. Negative for blurred vision and double vision. Respiratory: Negative. Negative for cough, hemoptysis, sputum production and shortness of breath. Cardiovascular: Negative. Negative for chest pain, palpitations and orthopnea. Gastrointestinal: Negative. Negative for abdominal pain, blood in stool, heartburn, nausea and vomiting. Genitourinary: Negative. Negative for dysuria, frequency and urgency. Musculoskeletal: Negative. Negative for back pain, myalgias and neck pain. Skin: Negative. Negative for rash. Neurological: Negative. Negative for dizziness, tingling, tremors, weakness and headaches. Endo/Heme/Allergies: Negative. Psychiatric/Behavioral: Negative. Negative for depression. Physical Examination     Wt Readings from Last 3 Encounters:   02/12/19 199 lb 3.2 oz (90.4 kg)   01/04/19 199 lb 9.6 oz (90.5 kg)   12/27/18 196 lb 12.8 oz (89.3 kg)     Temp Readings from Last 3 Encounters:   02/12/19 98.2 °F (36.8 °C) (Oral)   01/04/19 97.7 °F (36.5 °C) (Oral)   12/27/18 97.4 °F (36.3 °C) (Oral)     BP Readings from Last 3 Encounters:   02/12/19 104/69   01/04/19 125/77   12/27/18 115/70     Pulse Readings from Last 3 Encounters:   02/12/19 63   01/04/19 (!) 56   12/27/18 62       Body mass index is 32.15 kg/m². No exam data present  Was the patient's timed Up & Go test unsteady or longer than 10 seconds? no    Evaluation of Cognitive Function   Mood/affect:  happy  Orientation: Person, Place and Time  Appearance: age appropriate and casually dressed  Family member/caregiver input: no    Additional exam if indicated for problems addressed today:  Physical Exam   Constitutional: She is oriented to person, place, and time. She appears well-developed and well-nourished. No distress. HENT:   Head: Normocephalic and atraumatic. Right Ear: External ear normal.   Mouth/Throat: Oropharynx is clear and moist.   No signs of right otitis media. She could have eustachian tube dysfunction based on her symptoms. Eyes: Conjunctivae are normal. No scleral icterus. Neck: No thyromegaly present. No carotid bruit. Cardiovascular: Normal rate, regular rhythm and normal heart sounds. Exam reveals no gallop and no friction rub. No murmur heard. Pulmonary/Chest: Effort normal and breath sounds normal. She has no wheezes. She has no rales. Abdominal: Soft. Bowel sounds are normal. She exhibits no distension. There is no tenderness. There is no rebound and no guarding. Musculoskeletal: She exhibits no edema. Lymphadenopathy:     She has no cervical adenopathy.    Neurological: She is alert and oriented to person, place, and time. Skin: Skin is warm and dry. No rash noted. She is not diaphoretic. Psychiatric: She has a normal mood and affect. Her behavior is normal. Thought content normal.   Nursing note and vitals reviewed. Advice/Referrals/Counseling (as indicated)     Documents in her chart already. Preventive Services     (Preventive care checklist to be included in patient instructions)  Discussed today Done Previously Not Needed     x  Pneumococcal vaccines    x  Flu vaccine     x Hepatitis B vaccine (if at risk)   x   Shingles vaccine    x  TDAP vaccine    x  Mammogram     x Colorectal cancer screening     x Low-dose CT for lung cancer screening     x Bone density test    x  Glaucoma screening    x  Cholesterol test    x  Diabetes screening test      x Diabetes self-management class     x Nutritionist referral for diabetes or renal disease     Discussion of Advance Directive   Discussed with Alonso Angel her ability to prepare and advance directive in the case that an injury or illness causes her to be unable to make health care decisions. Assessment/Plan   Z00.00    ICD-10-CM ICD-9-CM    1. Medicare annual wellness visit, subsequent Z00.00 V70.0      2. Acute recurrent maxillary sinusitis J01.01 461.0  she is to finish her current Augmentin prescription and call me next Monday because we may need to extend her course. When she returns next week we will also do a tympanometry if her ear symptoms have not resolved.        Orders Placed This Encounter    amoxicillin-clavulanate (AUGMENTIN) 875-125 mg per tablet    fluticasone (FLONASE) 50 mcg/actuation nasal spray       Patient Care Team:  Diamond Alpers, MD as PCP - Inder Bailey MD (Ophthalmology)    Follow-up Disposition: Not on File    Future Appointments   Date Time Provider Jero Glez   2/20/2019  8:00 AM Diamond Alpers, MD Dimitri Brunt, MD

## 2019-02-22 ENCOUNTER — TELEPHONE (OUTPATIENT)
Dept: FAMILY MEDICINE CLINIC | Age: 74
End: 2019-02-22

## 2019-02-22 ENCOUNTER — OFFICE VISIT (OUTPATIENT)
Dept: FAMILY MEDICINE CLINIC | Age: 74
End: 2019-02-22

## 2019-02-22 VITALS
DIASTOLIC BLOOD PRESSURE: 69 MMHG | OXYGEN SATURATION: 97 % | SYSTOLIC BLOOD PRESSURE: 105 MMHG | HEART RATE: 72 BPM | HEIGHT: 66 IN | WEIGHT: 201.8 LBS | BODY MASS INDEX: 32.43 KG/M2 | TEMPERATURE: 98 F | RESPIRATION RATE: 18 BRPM

## 2019-02-22 DIAGNOSIS — H02.834 DERMATOCHALASIS OF LEFT UPPER EYELID: ICD-10-CM

## 2019-02-22 DIAGNOSIS — Z01.818 PREOP EXAMINATION: Primary | ICD-10-CM

## 2019-02-22 DIAGNOSIS — I63.9 CEREBROVASCULAR ACCIDENT (CVA), UNSPECIFIED MECHANISM (HCC): ICD-10-CM

## 2019-02-22 DIAGNOSIS — H91.91 HEARING LOSS OF RIGHT EAR, UNSPECIFIED HEARING LOSS TYPE: ICD-10-CM

## 2019-02-22 DIAGNOSIS — J32.0 CHRONIC MAXILLARY SINUSITIS: ICD-10-CM

## 2019-02-22 DIAGNOSIS — E78.00 HYPERCHOLESTEREMIA: ICD-10-CM

## 2019-02-22 DIAGNOSIS — H02.831 DERMATOCHALASIS OF RIGHT UPPER EYELID: ICD-10-CM

## 2019-02-22 DIAGNOSIS — J45.20 MILD INTERMITTENT ASTHMA WITHOUT COMPLICATION: ICD-10-CM

## 2019-02-22 RX ORDER — CEFDINIR 300 MG/1
300 CAPSULE ORAL 2 TIMES DAILY
Qty: 20 CAP | Refills: 0 | Status: SHIPPED | OUTPATIENT
Start: 2019-02-22 | End: 2019-03-04

## 2019-02-22 NOTE — TELEPHONE ENCOUNTER
Phone call to [facility] -- [phone] to inform them that the patient has a persistent sinus infection and that the surgeon may want to hold off on her surgery. Dr. Aneesh Dyson does not want to stop her blood thinner if she is not going to have the surgery. Detailed message left on the confidential voicemail at [facility]. Spoke with patient and advised her of the following: \"She still has a persistent sinus infection by x-ray. This is even though her symptoms have improved. We have put a call into the eye physician but suspect they will want to cancel her procedure until the infection is addressed. Also placed an ENT consult. I sent to [facility] a new antibiotic which will be the third one she has had. Omnicef. \"  Patient verbalized understanding.

## 2019-02-22 NOTE — PROGRESS NOTES
1. Have you been to the ER, urgent care clinic since your last visit? Hospitalized since your last visit? No    2. Have you seen or consulted any other health care providers outside of the [address] [place] [address] since your last visit? Include any pap smears or colon screening.  No  Reviewed record in preparation for visit and have necessary documentation  Pt did not bring medication to office visit for review  Information was given to pt on Advanced Directives, Living Will  Information was given on Shingles Vaccine  opportunity was given for questions  Goals that were addressed and/or need to be completed during or after this appointment include     Health Maintenance Due   Topic Date Due    GLAUCOMA SCREENING Q2Y  07/06/2018

## 2019-02-22 NOTE — PROGRESS NOTES
[address], [address] [place] [address]  [phone]           Progress Note    Patient: Tammie Boswell MRN: [id number]  SSN: [id number]    YOB: 1945  Age: 74 y.o. Sex: female        Chief Complaint   Patient presents with    Pre-op Exam         Subjective:     Encounter Diagnoses   Name Primary?  Preop examination: She will be cleared for her planned eye surgery if her sinusitis has resolved. See pre-op forms. Yes    Hypercholesteremia:  Cardiovascular risks for her are: LDL goal is under 80  prior CVA/TIA or known carotid artery disease. Key Antihyperlipidemia Meds             atorvastatin (LIPITOR) 40 mg tablet  (Taking) TAKE ONE TABLET BY MOUTH EVERY DAY *STOP NIACIN*    omega-3 fatty acids-vitamin e (FISH OIL) 1,000 mg cap  (Taking) Take 1 Cap by mouth two (2) times a day. Lab Results   Component Value Date/Time    Cholesterol, total 143 10/31/2018 12:53 PM    HDL Cholesterol 51 10/31/2018 12:53 PM    LDL, calculated 76 10/31/2018 12:53 PM    Triglyceride 81 10/31/2018 12:53 PM    CHOL/HDL Ratio 2.6 09/21/2010 08:03 AM     Lab Results   Component Value Date/Time    ALT (SGPT) 20 10/31/2018 12:53 PM    AST (SGOT) 22 10/31/2018 12:53 PM    Alk. phosphatase 84 10/31/2018 12:53 PM    Bilirubin, direct 0.24 08/18/2017 12:38 PM    Bilirubin, total 1.0 10/31/2018 12:53 PM      Myalgias: No   Fatigue: No   Other side effects: no  Wt Readings from Last 3 Encounters:   02/22/19 201 lb 12.8 oz (91.5 kg)   02/12/19 199 lb 3.2 oz (90.4 kg)   01/04/19 199 lb 9.6 oz (90.5 kg)     The patient is aware of our goal to reduce or eliminate the long term problems (such as strokes and heart attacks) related to poorly controlled hyperlipidemia.  Mild intermittent asthma without complication:  asthma, not currently in exacerbation. Asthma symptoms occur: infrequently.   Wheezing when present is described as mild and easily relieved with rescue bronchodilator. Current limitations in activity from asthma: none. SpO2 Readings from Last 3 Encounters:   02/22/19 97%   02/12/19 95%   01/04/19 96%     Frequency of use of quick-relief meds: rare. Medication compliance: Good  Patient does not smoke cigarettes. Monitors Peak Flow at home: no           Cerebrovascular accident (CVA), unspecified mechanism ([address]): CVA in 2006. No recurrence.  Chronic maxillary sinusitis: History of recent sinus films. Repeat sinus x-rays today to verify clearance of the infection. Sinus x-ray showed persistent air-fluid levels. With that in the loss of hearing in her right ear she needs to see ENT. Will call surgeon's office for guidance regarding her procedure. She will be put on Omnicef. This will be the third antibiotic she has had for this infection.  Dermatochalasis of left upper eyelid-to be repaired     Dermatochalasis of right upper eyelid-to be repaired         Hearing loss of right ear, unspecified hearing loss type: Right hearing loss after her sinus infection and ear infection. Tympanogram performed and interpreted. Both left and right pressures and tracings are equal and normal.              Current and past medical information:    Current Medications after this visit[de-identified]     Current Outpatient Medications   Medication Sig    amoxicillin-clavulanate (AUGMENTIN) 875-125 mg per tablet Take 1 Tab by mouth two (2) times a day.  fluticasone (FLONASE) 50 mcg/actuation nasal spray     omeprazole (PRILOSEC) 20 mg capsule TAKE ONE CAPSULE BY MOUTH EVERY DAY    atorvastatin (LIPITOR) 40 mg tablet TAKE ONE TABLET BY MOUTH EVERY DAY *STOP NIACIN*    omega-3 fatty acids-vitamin e (FISH OIL) 1,000 mg cap Take 1 Cap by mouth two (2) times a day.  aspirin delayed-release 81 mg tablet Take 81 mg by mouth daily.  CALCIUM CARBONATE (ISAI-600 PO) Take  by mouth.  Cholecalciferol, Vitamin D3, (VITAMIN D) 2,000 unit Cap Take  by mouth.  VITAMIN B COMPLEX (VITAMIN B-100 COMPLEX PO) Take  by mouth.  varicella-zoster gE vac,2 of 2 50 mcg susr Shingrix, one injection now and repeat in 4-6 months. To be administered at Pharmacy. Fax confirmation to me at [phone]. No current facility-administered medications for this visit. Patient Active Problem List    Diagnosis Date Noted    Vitamin D deficiency 01/07/2014     Priority: 1 - One    Hypercholesteremia 04/05/2010     Priority: 1 - One    CVA (cerebral vascular accident) ([address] 04/05/2010     Priority: 1 - One    Asthma 04/05/2010     Priority: 1 - One    Bronchitis, mucopurulent recurrent ([address] 05/14/2010     Priority: 3 - Three    Arthritis pain 12/09/2014     Priority: 4 - Four    Cataract, nuclear sclerotic, left eye 07/10/2017     Priority: 6 - Six    Dermatochalasis of left upper eyelid 07/10/2017     Priority: 6 - Six    Dermatochalasis of right upper eyelid 07/10/2017     Priority: 6 - Six    PCO (posterior capsular opacification), right 07/10/2017     Priority: 6 - Six    Pseudophakia of right eye 07/10/2017     Priority: 6 - Six       Past Medical History:   Diagnosis Date    Asthma 4/5/2010    Bronchitis, mucopurulent recurrent ([address] 5/14/2010    CVA (cerebral vascular accident) ([address] 4/5/2010    Hypercholesteremia 4/5/2010       No Known Allergies    Past Surgical History:   Procedure Laterality Date    HX HYSTERECTOMY      HX TONSILLECTOMY         Social History     Socioeconomic History    Marital status:      Spouse name: Not on file    Number of children: Not on file    Years of education: Not on file    Highest education level: Not on file   Tobacco Use    Smoking status: Never Smoker    Smokeless tobacco: Never Used   Substance and Sexual Activity    Alcohol use: No    Drug use: No       Review of Systems   Constitutional: Negative. Negative for chills, fever, malaise/fatigue and weight loss. HENT: Positive for hearing loss. Fairly sudden right hearing loss after her recent prolonged sinus infection. Tympanogram is normal.   Eyes: Negative. Negative for blurred vision and double vision. Respiratory: Negative. Negative for cough, hemoptysis, sputum production and shortness of breath. Cardiovascular: Negative. Negative for chest pain, palpitations and orthopnea. Gastrointestinal: Negative. Negative for abdominal pain, blood in stool, heartburn, nausea and vomiting. Genitourinary: Negative. Negative for dysuria, frequency and urgency. Musculoskeletal: Negative. Negative for back pain, myalgias and neck pain. Skin: Negative. Negative for rash. Neurological: Negative. Negative for dizziness, tingling, tremors, weakness and headaches. Endo/Heme/Allergies: Negative. Psychiatric/Behavioral: Negative. Negative for depression. Objective:     Vitals:    02/22/19 1430   BP: 105/69   Pulse: 72   Resp: 18   Temp: 98 °F (36.7 °C)   TempSrc: Oral   SpO2: 97%   Weight: 201 lb 12.8 oz (91.5 kg)   Height: 5' 6\" (1.676 m)      Body mass index is 32.57 kg/m². Physical Exam   Constitutional: She is oriented to person, place, and time and well-developed, well-nourished, and in no distress. No distress. HENT:   Head: Normocephalic and atraumatic. Right Ear: External ear normal.   Left Ear: External ear normal.   Mouth/Throat: Oropharynx is clear and moist.   Eyes: Conjunctivae are normal.   Neck: No thyromegaly present. Cardiovascular: Normal rate, regular rhythm and normal heart sounds. No murmur heard. Pulmonary/Chest: Effort normal and breath sounds normal. No respiratory distress. Abdominal: Soft. She exhibits no distension. There is no tenderness. There is no rebound. Musculoskeletal: She exhibits no edema. Neurological: She is alert and oriented to person, place, and time. Gait is normal.  No asymmetric neurologic symptoms. Skin: Skin is warm. No rash noted. She is not diaphoretic. No erythema. Psychiatric: Mood and affect normal.   Nursing note and vitals reviewed. Health Maintenance Due   Topic Date Due    GLAUCOMA SCREENING Q2Y  07/06/2018         Assessment and orders:     Encounter Diagnoses     ICD-10-CM ICD-9-CM   1. Preop examination Z01.818 V72.84   2. Hypercholesteremia E78.00 272.0   3. Mild intermittent asthma without complication V17.19 192.96   4. Cerebrovascular accident (CVA), unspecified mechanism ([facility] I63.9 434.91   5. Chronic maxillary sinusitis J32.0 473.0   6. Dermatochalasis of left upper eyelid H02.834 374.87   7. Dermatochalasis of right upper eyelid H02.831 374.87   8. Hearing loss of right ear, unspecified hearing loss type H91.91 389.9     Diagnoses and all orders for this visit:    1. Preop examination-symptomatically her symptoms are chronic sinusitis have resolved. Unfortunately she has been left with hearing loss in her right ear. I will repeat her sinus films today prior to clearing her for surgery. Symptomatically she is cleared. -     METABOLIC PANEL, COMPREHENSIVE  -     LIPID PANEL    2. Hypercholesteremia-retest in her convenience  -     METABOLIC PANEL, COMPREHENSIVE  -     LIPID PANEL    3. Mild intermittent asthma without complication-no symptoms of uncontrolled    4. Cerebrovascular accident (CVA), unspecified mechanism (HCC)-no recurrence  -     METABOLIC PANEL, COMPREHENSIVE  -     LIPID PANEL    5. Chronic maxillary sinusitis  -     XR SINUSES PARANASAL MAX 2 V; Future    6. Dermatochalasis of left upper eyelid-to be repaired    7. Dermatochalasis of right upper eyelid-to be repaired    8. Hearing loss of right ear, unspecified hearing loss type-unusual sudden hearing loss status post ear infection and sinus infection.  -     REFERRAL TO ENT-OTOLARYNGOLOGY            Plan of care:  Discussed diagnoses in detail with patient. Medication risks/benefits/side effects discussed with patient. All of the patient's questions were addressed.  The patient understands and agrees with our plan of care. The patient knows to call back if they are unsure of or forget any changes we discussed today or if the symptoms change. The patient received an After-Visit Summary which contains VS, orders, medication list and allergy list. This can be used as a \"mini-medical record\" should they have to seek medical care while out of town. Patient Care Team:  Gloria Trujillo MD as PCP - Jigna Eckert MD (Ophthalmology)    Follow-up Disposition:  Return in about 2 months (around 4/22/2019). No future appointments. Signed By: Taylor Carney MD     February 22, 2019        This note was created using voice recognition software. Despite editing, there may be syntax errors.

## 2019-02-22 NOTE — LETTER
2/22/2019 2:41 PM 
 
Ms. Pb Hassan [address] [address] No Known Allergies Patient Active Problem List  
Diagnosis Code  Hypercholesteremia E78.00  CVA (cerebral vascular accident) ([facility] I63.9  Asthma J45.909  Bronchitis, mucopurulent recurrent (HCC) J41.1  Vitamin D deficiency E55.9  Arthritis pain M19.90  
 Cataract, nuclear sclerotic, left eye H25.12  Dermatochalasis of left upper eyelid H02.834  
 Dermatochalasis of right upper eyelid H02.831  
 PCO (posterior capsular opacification), right H26.491  Pseudophakia of right eye Z96.1 Current Outpatient Medications on File Prior to Visit Medication Sig Dispense Refill  amoxicillin-clavulanate (AUGMENTIN) 875-125 mg per tablet Take 1 Tab by mouth two (2) times a day.  fluticasone (FLONASE) 50 mcg/actuation nasal spray  omeprazole (PRILOSEC) 20 mg capsule TAKE ONE CAPSULE BY MOUTH EVERY DAY 90 Cap 1  
 atorvastatin (LIPITOR) 40 mg tablet TAKE ONE TABLET BY MOUTH EVERY DAY *STOP NIACIN* 90 Tab 1  
 omega-3 fatty acids-vitamin e (FISH OIL) 1,000 mg cap Take 1 Cap by mouth two (2) times a day.  aspirin delayed-release 81 mg tablet Take 81 mg by mouth daily.  CALCIUM CARBONATE (ISAI-600 PO) Take  by mouth.  Cholecalciferol, Vitamin D3, (VITAMIN D) 2,000 unit Cap Take  by mouth.  VITAMIN B COMPLEX (VITAMIN B-100 COMPLEX PO) Take  by mouth.  varicella-zoster gE vac,2 of 2 50 mcg susr Shingrix, one injection now and repeat in 4-6 months. To be administered at Pharmacy. Fax confirmation to me at [phone]. 2 Each 0 No current facility-administered medications on file prior to visit. has a past medical history of Asthma (4/5/2010), Bronchitis, mucopurulent recurrent ([address] (5/14/2010), CVA (cerebral vascular accident) ([facility] (4/5/2010), and Hypercholesteremia (4/5/2010). She also has no past medical history of History of abuse.  
 
 
Past Surgical History: Procedure Laterality Date  HX HYSTERECTOMY  HX TONSILLECTOMY Sincerely, 
 
 
Nova Lanes, MD

## 2019-03-02 LAB
ALBUMIN SERPL-MCNC: 4.2 G/DL (ref 3.5–4.8)
ALBUMIN/GLOB SERPL: 1.6 {RATIO} (ref 1.2–2.2)
ALP SERPL-CCNC: 90 IU/L (ref 39–117)
ALT SERPL-CCNC: 17 IU/L (ref 0–32)
AST SERPL-CCNC: 17 IU/L (ref 0–40)
BILIRUB SERPL-MCNC: 0.9 MG/DL (ref 0–1.2)
BUN SERPL-MCNC: 17 MG/DL (ref 8–27)
BUN/CREAT SERPL: 18 (ref 12–28)
CALCIUM SERPL-MCNC: 9.6 MG/DL (ref 8.7–10.3)
CHLORIDE SERPL-SCNC: 99 MMOL/L (ref 96–106)
CHOLEST SERPL-MCNC: 142 MG/DL (ref 100–199)
CO2 SERPL-SCNC: 24 MMOL/L (ref 20–29)
CREAT SERPL-MCNC: 0.94 MG/DL (ref 0.57–1)
GLOBULIN SER CALC-MCNC: 2.6 G/DL (ref 1.5–4.5)
GLUCOSE SERPL-MCNC: 89 MG/DL (ref 65–99)
HDLC SERPL-MCNC: 50 MG/DL
LDLC SERPL CALC-MCNC: 76 MG/DL (ref 0–99)
POTASSIUM SERPL-SCNC: 4.8 MMOL/L (ref 3.5–5.2)
PROT SERPL-MCNC: 6.8 G/DL (ref 6–8.5)
SODIUM SERPL-SCNC: 138 MMOL/L (ref 134–144)
TRIGL SERPL-MCNC: 78 MG/DL (ref 0–149)
VLDLC SERPL CALC-MCNC: 16 MG/DL (ref 5–40)

## 2019-05-03 ENCOUNTER — OFFICE VISIT (OUTPATIENT)
Dept: FAMILY MEDICINE CLINIC | Age: 74
End: 2019-05-03

## 2019-05-03 VITALS
TEMPERATURE: 98.4 F | RESPIRATION RATE: 12 BRPM | WEIGHT: 200 LBS | HEIGHT: 66 IN | OXYGEN SATURATION: 96 % | HEART RATE: 61 BPM | BODY MASS INDEX: 32.14 KG/M2 | DIASTOLIC BLOOD PRESSURE: 70 MMHG | SYSTOLIC BLOOD PRESSURE: 108 MMHG

## 2019-05-03 DIAGNOSIS — Z01.818 PREOP EXAMINATION: Primary | ICD-10-CM

## 2019-05-03 NOTE — PROGRESS NOTES
[address]  [phone]           Progress Note    Patient: Juan Vergara MRN: [id number]  SSN: [id number]    YOB: 1945  Age: 74 y.o. Sex: female        Chief Complaint   Patient presents with   Preop exam    Subjective:        Pre-op Exam:  She is scheduled to have bilateral blepharoplasty under MAC anesthesia. She is probably recovered from her chronic sinus infection. It took 2 months for that to resolve. He can no longer has any symptoms of serous otitis media either. Review of systems is unremarkable for any symptoms that would preclude her surgery. Overall she feels very well. Current and past medical information:    Current Medications after this visit[de-identified]     Current Outpatient Medications   Medication Sig    omeprazole (PRILOSEC) 20 mg capsule TAKE ONE CAPSULE BY MOUTH EVERY DAY    atorvastatin (LIPITOR) 40 mg tablet TAKE ONE TABLET BY MOUTH EVERY DAY *STOP NIACIN*    omega-3 fatty acids-vitamin e (FISH OIL) 1,000 mg cap Take 1 Cap by mouth two (2) times a day.  aspirin delayed-release 81 mg tablet Take 81 mg by mouth daily.  CALCIUM CARBONATE (ISAI-600 PO) Take  by mouth.  Cholecalciferol, Vitamin D3, (VITAMIN D) 2,000 unit Cap Take  by mouth.  VITAMIN B COMPLEX (VITAMIN B-100 COMPLEX PO) Take  by mouth.  fluticasone (FLONASE) 50 mcg/actuation nasal spray     varicella-zoster gE vac,2 of 2 50 mcg susr Shingrix, one injection now and repeat in 4-6 months. To be administered at Pharmacy. Fax confirmation to me at [phone]. No current facility-administered medications for this visit.         Patient Active Problem List    Diagnosis Date Noted    Vitamin D deficiency 01/07/2014     Priority: 1 - One    Hypercholesteremia 04/05/2010     Priority: 1 - One    [facility] 04/05/2010     Priority: 1 - One    Asthma 04/05/2010     Priority: 1 - One    Bronchitis, mucopurulent recurrent ([facility] 05/14/2010     Priority: 3 - Three    Arthritis pain 12/09/2014     Priority: 4 - Four    Cataract, nuclear sclerotic, left eye 07/10/2017     Priority: 6 - Six    Dermatochalasis of left upper eyelid 07/10/2017     Priority: 6 - Six    Dermatochalasis of right upper eyelid 07/10/2017     Priority: 6 - Six    PCO (posterior capsular opacification), right 07/10/2017     Priority: 6 - Six    Pseudophakia of right eye 07/10/2017     Priority: 6 - Six       Past Medical History:   Diagnosis Date    Asthma 4/5/2010    Bronchitis, mucopurulent recurrent ([facility] 5/14/2010    [facility]5/2010    Hypercholesteremia 4/5/2010       No Known Allergies    Past Surgical History:   Procedure Laterality Date    HX HYSTERECTOMY      HX TONSILLECTOMY         Social History     Socioeconomic History    Marital status:      Spouse name: Not on file    Number of children: Not on file    Years of education: Not on file    Highest education level: Not on file   Tobacco Use    Smoking status: Never Smoker    Smokeless tobacco: Never Used   Substance and Sexual Activity    Alcohol use: No    Drug use: No       Review of Systems   Constitutional: Negative. Negative for chills, fever, malaise/fatigue and weight loss. HENT: Negative. Negative for hearing loss. Eyes: Negative. Negative for blurred vision and double vision. Respiratory: Negative. Negative for cough, hemoptysis, sputum production, shortness of breath and wheezing. Cardiovascular: Negative. Negative for chest pain, palpitations, orthopnea and leg swelling. Gastrointestinal: Negative. Negative for abdominal pain, blood in stool, heartburn, nausea and vomiting. Genitourinary: Negative. Negative for dysuria, frequency and urgency. Musculoskeletal: Negative. Negative for back pain, myalgias and neck pain. Skin: Negative. Negative for rash.    Neurological: Negative. Negative for dizziness, tingling, tremors, weakness and headaches. Endo/Heme/Allergies: Negative. Psychiatric/Behavioral: Negative. Negative for depression. Objective:     Vitals:    05/03/19 1122   BP: 108/70   Pulse: 61   Resp: 12   Temp: 98.4 °F (36.9 °C)   TempSrc: Oral   SpO2: 96%   Weight: 200 lb (90.7 kg)   Height: 5' 6\" (1.676 m)      Body mass index is 32.28 kg/m². Physical Exam   Constitutional: She is oriented to person, place, and time and well-developed, well-nourished, and in no distress. No distress. HENT:   Head: Normocephalic and atraumatic. Mouth/Throat: Oropharynx is clear and moist.   Eyes: Conjunctivae are normal.   Neck: No thyromegaly present. Cardiovascular: Normal rate, regular rhythm and normal heart sounds. Exam reveals no gallop and no friction rub. No murmur heard. Pulmonary/Chest: Effort normal and breath sounds normal. No respiratory distress. Abdominal: Soft. She exhibits no distension. There is no tenderness. There is no rebound and no guarding. Musculoskeletal: She exhibits no edema. Neurological: She is alert and oriented to person, place, and time. Skin: Skin is warm. No rash noted. She is not diaphoretic. No erythema. Psychiatric: Mood and affect normal.   Nursing note and vitals reviewed. Health Maintenance Due   Topic Date Due    Pneumococcal 65+ years (2 of 2 - PPSV23) 11/10/2016    GLAUCOMA SCREENING Q2Y  07/06/2018         Assessment and orders:     Diagnoses and all orders for this visit:    1. Preop examination-she is cleared for her bilateral blepharoplasty. Please see the attached forms from [facility].     No Known Allergies      Patient Active Problem List   Diagnosis Code    Hypercholesteremia E78.00    CVA [facility]cerebral vascular accident) ([facility] I63.9    Asthma J45.909    Bronchitis, mucopurulent recurrent ([facility] J41.1    Vitamin D deficiency E55.9    Arthritis pain M19.90    Cataract, nuclear sclerotic, left eye H25.12    Dermatochalasis of left upper eyelid H02.834    Dermatochalasis of right upper eyelid H02.831    PCO (posterior capsular opacification), right H26.491    Pseudophakia of right eye Z96.1         Current Outpatient Medications on File Prior to Visit   Medication Sig Dispense Refill    omeprazole (PRILOSEC) 20 mg capsule TAKE ONE CAPSULE BY MOUTH EVERY DAY 90 Cap 1    atorvastatin (LIPITOR) 40 mg tablet TAKE ONE TABLET BY MOUTH EVERY DAY *STOP NIACIN* 90 Tab 1    omega-3 fatty acids-vitamin e (FISH OIL) 1,000 mg cap Take 1 Cap by mouth two (2) times a day.  aspirin delayed-release 81 mg tablet Take 81 mg by mouth daily.  CALCIUM CARBONATE (ISAI-600 PO) Take  by mouth.  Cholecalciferol, Vitamin D3, (VITAMIN D) 2,000 unit Cap Take  by mouth.  VITAMIN B COMPLEX (VITAMIN B-100 COMPLEX PO) Take  by mouth.  fluticasone (FLONASE) 50 mcg/actuation nasal spray       varicella-zoster gE vac,2 of 2 50 mcg susr Shingrix, one injection now and repeat in 4-6 months. To be administered at Pharmacy. Fax confirmation to me at [phone]. 2 Each 0     No current facility-administered medications on file prior to visit. has a past medical history of Asthma (4/5/2010), Bronchitis, mucopurulent recurrent ([facility] (5/14/2010), CVA (cerebral vascular accident) ([facility] (4/5/2010), and Hypercholesteremia (4/5/2010). She also has no past medical history of History of abuse. Past Surgical History:   Procedure Laterality Date    HX HYSTERECTOMY      HX TONSILLECTOMY           Plan of care:  Discussed diagnoses in detail with patient. Medication risks/benefits/side effects discussed with patient. All of the patient's questions were addressed. The patient understands and agrees with our plan of care. The patient knows to call back if they are unsure of or forget any changes we discussed today or if the symptoms change.      The patient received an After-Visit Summary which contains VS, orders, medication list and allergy list. This can be used as a \"mini-medical record\" should they have to seek medical care while out of town. Patient Care Team:  Gulshan Wheeler MD as PCP - Mallory Hickey MD (Ophthalmology)  Sammie Bowman MD (Otolaryngology)        No future appointments. Signed By: Evgeny Bell MD     May 3, 2019      ATTENTION:   This medical record was transcribed using an electronic medical records/speech recognition system. Although proofread, it may and can contain electronic, spelling and other errors. Corrections may be executed at a later time. Please feel free to contact me for any clarifications as needed.

## 2019-05-03 NOTE — PROGRESS NOTES
1. Have you been to the ER, urgent care clinic since your last visit? Hospitalized since your last visit? No    2. Have you seen or consulted any other health care providers outside of the [address] [place] [address] since your last visit? Include any pap smears or colon screening.  No  Reviewed record in preparation for visit and have necessary documentation  Pt did not bring medication to office visit for review    Goals that were addressed and/or need to be completed during or after this appointment include     Health Maintenance Due   Topic Date Due    Pneumococcal 65+ years (2 of 2 - PPSV23) 11/10/2016    GLAUCOMA SCREENING Q2Y  07/06/2018

## 2019-05-03 NOTE — LETTER
5/3/2019 11:36 AM 
 
Ms. Oriana Hassan [address] [address] No Known Allergies Patient Active Problem List  
Diagnosis Code  Hypercholesteremia E78.00  CVA (cerebral vascular accident) ([facility] I63.9  Asthma J45.909  Bronchitis, mucopurulent recurrent (HCC) J41.1  Vitamin D deficiency E55.9  Arthritis pain M19.90  
 Cataract, nuclear sclerotic, left eye H25.12  Dermatochalasis of left upper eyelid H02.834  
 Dermatochalasis of right upper eyelid H02.831  
 PCO (posterior capsular opacification), right H26.491  Pseudophakia of right eye Z96.1 Current Outpatient Medications on File Prior to Visit Medication Sig Dispense Refill  omeprazole (PRILOSEC) 20 mg capsule TAKE ONE CAPSULE BY MOUTH EVERY DAY 90 Cap 1  
 atorvastatin (LIPITOR) 40 mg tablet TAKE ONE TABLET BY MOUTH EVERY DAY *STOP NIACIN* 90 Tab 1  
 omega-3 fatty acids-vitamin e (FISH OIL) 1,000 mg cap Take 1 Cap by mouth two (2) times a day.  aspirin delayed-release 81 mg tablet Take 81 mg by mouth daily.  CALCIUM CARBONATE (ISAI-600 PO) Take  by mouth.  Cholecalciferol, Vitamin D3, (VITAMIN D) 2,000 unit Cap Take  by mouth.  VITAMIN B COMPLEX (VITAMIN B-100 COMPLEX PO) Take  by mouth.  fluticasone (FLONASE) 50 mcg/actuation nasal spray  varicella-zoster gE vac,2 of 2 50 mcg susr Shingrix, one injection now and repeat in 4-6 months. To be administered at Pharmacy. Fax confirmation to me at [phone]. 2 Each 0 No current facility-administered medications on file prior to visit. has a past medical history of Asthma (4/5/2010), Bronchitis, mucopurulent recurrent ([facility] [address] (5/14/2010), CVA (cerebral vascular accident) ([facility] (4/5/2010), and Hypercholesteremia (4/5/2010). She also has no past medical history of History of abuse. Past Surgical History:  
Procedure Laterality Date  HX HYSTERECTOMY  HX TONSILLECTOMY

## 2019-06-11 ENCOUNTER — OFFICE VISIT (OUTPATIENT)
Dept: FAMILY MEDICINE CLINIC | Age: 74
End: 2019-06-11

## 2019-06-11 VITALS
TEMPERATURE: 97.9 F | OXYGEN SATURATION: 95 % | DIASTOLIC BLOOD PRESSURE: 85 MMHG | HEIGHT: 66 IN | HEART RATE: 60 BPM | RESPIRATION RATE: 18 BRPM | SYSTOLIC BLOOD PRESSURE: 134 MMHG | WEIGHT: 191 LBS | BODY MASS INDEX: 30.7 KG/M2

## 2019-06-11 DIAGNOSIS — R10.32 LLQ ABDOMINAL PAIN: Primary | ICD-10-CM

## 2019-06-11 NOTE — PROGRESS NOTES
1. Have you been to the ER, urgent care clinic since your last visit? Hospitalized since your last visit? no    2. Have you seen or consulted any other health care providers outside of the [address] [place] [address] since your last visit? Including any pap smears or colon screening. no    Reviewed record in preparation for visit and have necessary documentation    Pt did not bring medication to office visit for review      Goals that were addressed and/or need to be completed during or after this appointment include     Health Maintenance Due   Topic Date Due    Pneumococcal 65+ years (2 of 2 - PPSV23) 11/10/2016     Body mass index is 30.83 kg/m².

## 2019-06-16 NOTE — PATIENT INSTRUCTIONS
Constipation: Care Instructions  Your Care Instructions    Constipation means that you have a hard time passing stools (bowel movements). People pass stools from 3 times a day to once every 3 days. What is normal for you may be different. Constipation may occur with pain in the rectum and cramping. The pain may get worse when you try to pass stools. Sometimes there are small amounts of bright red blood on toilet paper or the surface of stools. This is because of enlarged veins near the rectum (hemorrhoids). A few changes in your diet and lifestyle may help you avoid ongoing constipation. Your doctor may also prescribe medicine to help loosen your stool. Some medicines can cause constipation. These include pain medicines and antidepressants. Tell your doctor about all the medicines you take. Your doctor may want to make a medicine change to ease your symptoms. Follow-up care is a key part of your treatment and safety. Be sure to make and go to all appointments, and call your doctor if you are having problems. It's also a good idea to know your test results and keep a list of the medicines you take. How can you care for yourself at home? · Drink plenty of fluids, enough so that your urine is light yellow or clear like water. If you have kidney, heart, or liver disease and have to limit fluids, talk with your doctor before you increase the amount of fluids you drink. · Include high-fiber foods in your diet each day. These include fruits, vegetables, beans, and whole grains. · Get at least 30 minutes of exercise on most days of the week. Walking is a good choice. You also may want to do other activities, such as running, swimming, cycling, or playing tennis or team sports. · Take a fiber supplement, such as Citrucel or Metamucil, every day. Read and follow all instructions on the label. · Schedule time each day for a bowel movement. A daily routine may help.  Take your time having your bowel movement. · Support your feet with a small step stool when you sit on the toilet. This helps flex your hips and places your pelvis in a squatting position. · Your doctor may recommend an over-the-counter laxative to relieve your constipation. Examples are Milk of Magnesia and MiraLax. Read and follow all instructions on the label. Do not use laxatives on a long-term basis. When should you call for help? Call your doctor now or seek immediate medical care if:    · You have new or worse belly pain.     · You have new or worse nausea or vomiting.     · You have blood in your stools.    Watch closely for changes in your health, and be sure to contact your doctor if:    · Your constipation is getting worse.     · You do not get better as expected. Where can you learn more? Go to http://chuck-hank.info/. Enter 21 [phone] in the search box to learn more about \"Constipation: Care Instructions. \"  Current as of: September 23, 2018  Content Version: 11.9  © 6951-5690 [employer]. Care instructions adapted under license by [employer] (which disclaims liability or warranty for this information). If you have questions about a medical condition or this instruction, always ask your healthcare professional. [place] [address] any warranty or liability for your use of this information.

## 2019-06-16 NOTE — PROGRESS NOTES
Patient: Juan Vergara MRN: [id number]  SSN: [id number]    YOB: 1945  Age: 74 y.o. Sex: female      Chief Complaint   Patient presents with    Heartburn    Vomiting     last night with left sided pain     Juan Vergara is a 74 y.o. female with complaint of gastrointestinal symptoms of vomiting, pain in the LLQ for 1  day. There is no fevers, blood in emesis. Patient admits to irregular BMs. Has started OTC medication for constipation. Patient denies HA, dizziness, SOB, CP, dysuria, myalgias or arthralgias. Medications:     Current Outpatient Medications   Medication Sig    atorvastatin (LIPITOR) 40 mg tablet TAKE ONE TABLET BY MOUTH EVERY DAY *STOP NIACIN*    varicella-zoster gE vac,2 of 2 50 mcg susr Shingrix, one injection now and repeat in 4-6 months. To be administered at Pharmacy. Fax confirmation to me at [phone].  omeprazole (PRILOSEC) 20 mg capsule TAKE ONE CAPSULE BY MOUTH EVERY DAY    omega-3 fatty acids-vitamin e (FISH OIL) 1,000 mg cap Take 1 Cap by mouth two (2) times a day.  aspirin delayed-release 81 mg tablet Take 81 mg by mouth daily.  CALCIUM CARBONATE (ISAI-600 PO) Take  by mouth.  Cholecalciferol, Vitamin D3, (VITAMIN D) 2,000 unit Cap Take  by mouth.  VITAMIN B COMPLEX (VITAMIN B-100 COMPLEX PO) Take  by mouth.  fluticasone (FLONASE) 50 mcg/actuation nasal spray      No current facility-administered medications for this visit.         Problem List:     Patient Active Problem List    Diagnosis Date Noted    Cataract, nuclear sclerotic, left eye 07/10/2017    Dermatochalasis of left upper eyelid 07/10/2017    Dermatochalasis of right upper eyelid 07/10/2017    PCO (posterior capsular opacification), right 07/10/2017    Pseudophakia of right eye 07/10/2017    Arthritis pain 12/09/2014    Vitamin D deficiency 01/07/2014    Bronchitis, mucopurulent recurrent ([facility] 05/14/2010    Hypercholesteremia 04/05/2010    CVA (cerebral vascular accident) ([facility] 04/05/2010    Asthma 04/05/2010       Medical History:     Past Medical History:   Diagnosis Date    Asthma 4/5/2010    Bronchitis, mucopurulent recurrent ([facility] 5/14/2010    CVA (cerebral vascular accident) ([facility] 4/5/2010    Hypercholesteremia 4/5/2010       Allergies:   No Known Allergies    Surgical History:     Past Surgical History:   Procedure Laterality Date    HX HYSTERECTOMY      HX TONSILLECTOMY         Social History:     Social History     Socioeconomic History    Marital status:      Spouse name: Not on file    Number of children: Not on file    Years of education: Not on file    Highest education level: Not on file   Tobacco Use    Smoking status: Never Smoker    Smokeless tobacco: Never Used   Substance and Sexual Activity    Alcohol use: No    Drug use: No       Review of Symptoms:  Constitutional: No fever, chills, fatigue, malaise  HEENT: Negative for sore throat, congestion, otalgia  Cardiovascular: Negative for chest pain or palpitations  Respiratory: Negative for cough, wheezing or SOB  Gastrointestinal: see HPI  Urinary: Negative for dysuria or voiding dysfunction  Musculoskeletal: Negative for cramping, myalgias or arthralgias   Neurological: Negative for headache, weakness or paresthesia     Vitals:    06/11/19 0827   BP: 134/85   Pulse: 60   Resp: 18   Temp: 97.9 °F (36.6 °C)   TempSrc: Oral   SpO2: 95%   Weight: 191 lb (86.6 kg)   Height: 5' 6\" (1.676 m)      Physical Examination:   General: well developed, well nourished, appears fatigued  Head: Normocephalic, atraumatic  Eyes: Sclera clear, EOMI  Oropharynx: Mucous membranes moist  Neck: full range of motion, no lymphadenopathy  Cardiovascular: normal S1, S2, Regular Rate and Rhythm  Respiratory: Clear to auscultation bilaterally with symmetrical effort  Abdomen: abdomen is soft without significant tenderness, masses, organomegaly or guarding. Mabeline Sink   Extrmities: Full Range of motion  Neurological: Active, alert and oriented, No focal deficits    Abd Xray: fecal stasis    Diagnoses and all orders for this visit:    1. LLQ abdominal pain  -     XR ABD FLAT/ ERECT; Future        Plan of care:  Diagnoses were discussed in detail with patient. Medication risks/benefits/side effects discussed with patient. All of the patient's questions were addressed and answered to apparent satisfaction. The patient understands and agrees with our plan of care. The patient knows to call back if they have questions about the plan of care or if symptoms change. The patient received an After-Visit Summary which contains VS, diagnoses, orders, allergy and medication lists. Future Appointments   Date Time Provider Jero Glez   11/4/2019  8:40 AM Elias Vitale MD [facility] SCHED         Follow-up and Dispositions    · Return if symptoms worsen or fail to improve.

## 2019-09-20 ENCOUNTER — TELEPHONE (OUTPATIENT)
Dept: FAMILY MEDICINE CLINIC | Age: 74
End: 2019-09-20

## 2019-09-20 RX ORDER — SULFAMETHOXAZOLE AND TRIMETHOPRIM 800; 160 MG/1; MG/1
1 TABLET ORAL 2 TIMES DAILY
Qty: 28 TAB | Refills: 0 | Status: SHIPPED | OUTPATIENT
Start: 2019-09-20 | End: 2019-10-04

## 2019-09-20 NOTE — TELEPHONE ENCOUNTER
Telephone Encounter        Caller's first/last name: Soco Hides       Relationship to patient and are they on HIPAA:  Self      Reason for call:  Cellulitis      Further clarification of call:  Pt would like to advise Dr. Cornelio Baird that another cellulite spot is starting. She states that she was told if another spot did form to call office and a Rx would be sent to pharmacy. Pt would like Rx to be sent to [facility]. Does caller want a return call? Yes, once Rx has been sent to pharmacy      Best contact number:  [phone]      Did you check for a duplicate Encounter?   Yes

## 2019-09-20 NOTE — TELEPHONE ENCOUNTER
Called patient and made her aware of ABT sent to pharmacy. Appt scheduled for Monday for skin check. Advised her to keep this appt. She verbalized understanding.

## 2019-09-23 ENCOUNTER — OFFICE VISIT (OUTPATIENT)
Dept: FAMILY MEDICINE CLINIC | Age: 74
End: 2019-09-23

## 2019-09-23 VITALS
SYSTOLIC BLOOD PRESSURE: 126 MMHG | OXYGEN SATURATION: 96 % | HEART RATE: 59 BPM | DIASTOLIC BLOOD PRESSURE: 66 MMHG | RESPIRATION RATE: 18 BRPM | WEIGHT: 199 LBS | TEMPERATURE: 98.3 F | HEIGHT: 66 IN | BODY MASS INDEX: 31.98 KG/M2

## 2019-09-23 DIAGNOSIS — Z23 ENCOUNTER FOR IMMUNIZATION: ICD-10-CM

## 2019-09-23 DIAGNOSIS — L03.115 CELLULITIS OF RIGHT LOWER EXTREMITY: Primary | ICD-10-CM

## 2019-09-23 DIAGNOSIS — A49.02 MRSA (METHICILLIN RESISTANT STAPHYLOCOCCUS AUREUS): ICD-10-CM

## 2019-09-23 NOTE — PATIENT INSTRUCTIONS
MRSA: Care Instructions  Your Care Instructions    MRSA stands for methicillin-resistant Staphylococcus aureus. It is a type of bacteria that can cause a staph infection. But it cannot be killed by the antibiotic methicillin and some other antibiotics. This sometimes makes it harder to treat. The bacteria are widespread on skin and in the nose. MRSA can cause infections of the skin, heart, blood, and bones. The bacteria can spread quickly in the body and cause serious problems. MRSA can also be spread from person to person. Depending on how serious your infection is, the doctor may drain your wound and you may get antibiotics through a small tube placed in a vein (IV). Your doctor may also give you an antibiotic ointment to use on sores or in your nose. Follow-up care is a key part of your treatment and safety. Be sure to make and go to all appointments, and call your doctor if you are having problems. It's also a good idea to know your test results and keep a list of the medicines you take. How can you care for yourself at home? · Take your antibiotics as directed. Do not stop taking them just because you feel better. You need to take the full course of antibiotics. · Keep any cuts or other wounds covered while they heal.  · Wash your hands often, especially after you touch elastic bandages or other dressings over a wound. This can keep the bacteria from spreading. Wrap bandages in a plastic bag before you throw them away. · Do not share towels, washcloths, razors, clothing, or other items that touched your wound or bandage. Wash your sheets, towels, and clothes with warm water and detergent. Dry them in a hot dryer, if possible. · Keep shared areas clean by wiping down surfaces (such as countertops, doorknobs, and light switches) with a disinfectant. When should you call for help?   Call your doctor now or seek immediate medical care if:    · You have worse symptoms of infection, such as:  ? Increased pain, swelling, warmth, or redness. ? Red streaks leading from the area. ? Pus draining from the area. ? A fever.    Watch closely for changes in your health, and be sure to contact your doctor if:    · You do not get better as expected. Where can you learn more? Go to http://chuck-hank.info/. Enter D790 in the search box to learn more about \"MRSA: Care Instructions. \"  Current as of: June 9, 2019  Content Version: 12.2  © 4595-5313 [employer]. Care instructions adapted under license by [employer] (which disclaims liability or warranty for this information). If you have questions about a medical condition or this instruction, always ask your healthcare professional. [address] any warranty or liability for your use of this information.

## 2019-09-23 NOTE — PROGRESS NOTES
1. Have you been to the ER, urgent care clinic since your last visit? Hospitalized since your last visit? No    2. Have you seen or consulted any other health care providers outside of the [address] [place] [address] since your last visit? Include any pap smears or colon screening.  No  Reviewed record in preparation for visit and have necessary documentation  Pt did not bring medication to office visit for review  Information was given to pt on Advanced Directives, Living Will  Information was given on Shingles Vaccine  opportunity was given for questions  Goals that were addressed and/or need to be completed during or after this appointment include     Health Maintenance Due   Topic Date Due    Pneumococcal 65+ years (2 of 2 - PPSV23) 11/10/2016    Influenza Age 5 to Adult  08/01/2019

## 2019-09-23 NOTE — PROGRESS NOTES
[address]  [phone]           Progress Note    Patient: Kristin Gomes MRN: [id number]  SSN: [id number]    YOB: 1945  Age: 66 y.o. Sex: female        Chief Complaint   Patient presents with    Skin Problem         Subjective:     Encounter Diagnoses   Name Primary?  Cellulitis of right lower extremity: She has a history of severe MRSA cellulitis in the past and called Friday afternoon with a new cellulitis from a scratch on her right lateral lower thigh. We started her on Bactrim over the weekend and the area encompassed by the cellulitis has decreased by at least 50%. She has no fever no chills no sweats. 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  Encounter for immunization: Flu shot today. She will need a pneumonia 23 shot the drugstore. Current and past medical information:    Current Medications after this visit[de-identified]     Current Outpatient Medications   Medication Sig    pneumococcal 23-valent (PNEUMOVAX 23) 25 mcg/0.5 mL injection 0.5 mL by IntraMUSCular route once for 1 dose. To be administered at Pharmacy. Fax confirmation to me at [phone]. Thank You.  trimethoprim-sulfamethoxazole (BACTRIM DS, SEPTRA DS) 160-800 mg per tablet Take 1 Tab by mouth two (2) times a day for 14 days. Indications: skin infection    atorvastatin (LIPITOR) 40 mg tablet TAKE ONE TABLET BY MOUTH EVERY DAY *STOP NIACIN*    fluticasone (FLONASE) 50 mcg/actuation nasal spray     omeprazole (PRILOSEC) 20 mg capsule TAKE ONE CAPSULE BY MOUTH EVERY DAY    omega-3 fatty acids-vitamin e (FISH OIL) 1,000 mg cap Take 1 Cap by mouth two (2) times a day.  aspirin delayed-release 81 mg tablet Take 81 mg by mouth daily.  CALCIUM CARBONATE (ISAI-600 PO) Take  by mouth.     Cholecalciferol, Vitamin D3, (VITAMIN D) 2,000 unit Cap Take  by mouth.  VITAMIN B COMPLEX (VITAMIN B-100 COMPLEX PO) Take  by mouth.  varicella-zoster gE vac,2 of 2 50 mcg susr Shingrix, one injection now and repeat in 4-6 months. To be administered at Pharmacy. Fax confirmation to me at [phone]. No current facility-administered medications for this visit. Patient Active Problem List    Diagnosis Date Noted    Vitamin D deficiency 01/07/2014     Priority: 1 - One    Hypercholesteremia 04/05/2010     Priority: 1 - One    CVA (cerebral vascular accident) ([address] 04/05/2010     Priority: 1 - One    Asthma 04/05/2010     Priority: 1 - One    Bronchitis, mucopurulent recurrent ([address] 05/14/2010     Priority: 3 - Three    Arthritis pain 12/09/2014     Priority: 4 - Four    Cataract, nuclear sclerotic, left eye 07/10/2017     Priority: 6 - Six    Dermatochalasis of left upper eyelid 07/10/2017     Priority: 6 - Six    Dermatochalasis of right upper eyelid 07/10/2017     Priority: 6 - Six    PCO (posterior capsular opacification), right 07/10/2017     Priority: 6 - Six    Pseudophakia of right eye 07/10/2017     Priority: 6 - Six       Past Medical History:   Diagnosis Date    Asthma 4/5/2010    Bronchitis, mucopurulent recurrent ([address] 5/14/2010    CVA (cerebral vascular accident) ([address] 4/5/2010    Hypercholesteremia 4/5/2010       No Known Allergies    Past Surgical History:   Procedure Laterality Date    HX HYSTERECTOMY      HX TONSILLECTOMY         Social History     Socioeconomic History    Marital status:      Spouse name: Not on file    Number of children: Not on file    Years of education: Not on file    Highest education level: Not on file   Tobacco Use    Smoking status: Never Smoker    Smokeless tobacco: Never Used   Substance and Sexual Activity    Alcohol use: No    Drug use: No       Review of Systems   Constitutional: Negative.   Negative for chills, fever, malaise/fatigue and weight loss.   HENT: Negative. Negative for hearing loss. Eyes: Negative. Negative for blurred vision and double vision. Respiratory: Negative. Cardiovascular: Negative. Gastrointestinal: Negative. Genitourinary: Negative. Musculoskeletal: Negative. Skin: Positive for rash. Neurological: Negative. Negative for weakness. Endo/Heme/Allergies: Negative. Psychiatric/Behavioral: Negative. Objective:     Vitals:    09/23/19 1557   BP: 126/66   Pulse: (!) 59   Resp: 18   Temp: 98.3 °F (36.8 °C)   TempSrc: Oral   SpO2: 96%   Weight: 199 lb (90.3 kg)   Height: 5' 6\" (1.676 m)      Body mass index is 32.12 kg/m². Physical Exam   Constitutional: She is oriented to person, place, and time and well-developed, well-nourished, and in no distress. No distress. HENT:   Head: Normocephalic and atraumatic. Mouth/Throat: Oropharynx is clear and moist.   Eyes: Conjunctivae are normal.   Neck: No thyromegaly present. No carotid bruit. Cardiovascular: Normal rate, regular rhythm and normal heart sounds. No murmur heard. Pulmonary/Chest: Effort normal and breath sounds normal. No respiratory distress. Abdominal: Soft. She exhibits no distension. Neurological: She is alert and oriented to person, place, and time. Skin: Skin is warm. No rash noted. She is not diaphoretic. There is erythema. Several superficial excoriation. Near 1 of these she has approximately 3 cm of redness there is fading. Decreased by about 50% since starting the Bactrim DS. Psychiatric: Mood and affect normal.   Nursing note and vitals reviewed. Health Maintenance Due   Topic Date Due    Shingrix Vaccine Age 49> (1 of 2) 08/25/1993    Pneumococcal 65+ years (2 of 2 - PPSV23) 11/10/2016    Influenza Age 5 to Adult  08/01/2019         Assessment and orders:     Encounter Diagnoses     ICD-10-CM ICD-9-CM   1. Cellulitis of right lower extremity L03.115 682.6   2.  MRSA (methicillin resistant Staphylococcus aureus) A49.02 041.12   3. Encounter for immunization Z23 V03.89     Diagnoses and all orders for this visit:    1. Cellulitis of right lower extremity-finish Bactrim DS for 14 days. 2. MRSA (methicillin resistant Staphylococcus aureus)-presumed, no discharge to culture and improving. 3. Encounter for immunization  -     INFLUENZA VACCINE INACTIVATED (IIV), SUBUNIT, ADJUVANTED, IM  -     ADMIN INFLUENZA VIRUS VAC  -     pneumococcal 23-valent (PNEUMOVAX 23) 25 mcg/0.5 mL injection; 0.5 mL by IntraMUSCular route once for 1 dose. To be administered at Pharmacy. Fax confirmation to me at [phone]. Thank You. Plan of care:  Discussed diagnoses in detail with patient. Medication risks/benefits/side effects discussed with patient. All of the patient's questions were addressed. The patient understands and agrees with our plan of care. The patient knows to call back if they are unsure of or forget any changes we discussed today or if the symptoms change. The patient received an After-Visit Summary which contains VS, orders, medication list and allergy list. This can be used as a \"mini-medical record\" should they have to seek medical care while out of town. Patient Care Team:  Sayra Eldridge MD as PCP - Elvin Koch MD (Ophthalmology)  Sukhdev King MD (Otolaryngology)        Future Appointments   Date Time Provider Jero Amaris   11/12/2019  8:40 AM Sayra Eldridge MD [address]       Signed By: Brian Lagunas MD     September 23, 2019      ATTENTION:   This medical record was transcribed using an electronic medical records/speech recognition system. Although proofread, it may and can contain electronic, spelling and other errors. Corrections may be executed at a later time. Please feel free to contact me for any clarifications as needed.

## 2019-11-12 ENCOUNTER — OFFICE VISIT (OUTPATIENT)
Dept: FAMILY MEDICINE CLINIC | Age: 74
End: 2019-11-12

## 2019-11-12 VITALS
WEIGHT: 197 LBS | TEMPERATURE: 98.6 F | RESPIRATION RATE: 20 BRPM | OXYGEN SATURATION: 97 % | DIASTOLIC BLOOD PRESSURE: 82 MMHG | HEIGHT: 66 IN | BODY MASS INDEX: 31.66 KG/M2 | SYSTOLIC BLOOD PRESSURE: 128 MMHG | HEART RATE: 64 BPM

## 2019-11-12 DIAGNOSIS — Z09 HOSPITAL DISCHARGE FOLLOW-UP: Primary | ICD-10-CM

## 2019-11-12 DIAGNOSIS — E78.00 HYPERCHOLESTEREMIA: Chronic | ICD-10-CM

## 2019-11-12 DIAGNOSIS — Z12.31 ENCOUNTER FOR SCREENING MAMMOGRAM FOR HIGH-RISK PATIENT: ICD-10-CM

## 2019-11-12 DIAGNOSIS — E55.9 VITAMIN D DEFICIENCY: ICD-10-CM

## 2019-11-12 DIAGNOSIS — J41.1 BRONCHITIS, MUCOPURULENT RECURRENT (HCC): Chronic | ICD-10-CM

## 2019-11-12 DIAGNOSIS — J45.20 MILD INTERMITTENT ASTHMA WITHOUT COMPLICATION: Chronic | ICD-10-CM

## 2019-11-12 DIAGNOSIS — K46.0 INCARCERATED HERNIA: ICD-10-CM

## 2019-11-12 DIAGNOSIS — I63.9 CEREBROVASCULAR ACCIDENT (CVA), UNSPECIFIED MECHANISM (HCC): ICD-10-CM

## 2019-11-12 NOTE — PROGRESS NOTES
1. Have you been to the ER, urgent care clinic since your last visit? Hospitalized since your last visit? No    2. Have you seen or consulted any other health care providers outside of the [address] [place] [address] since your last visit? Include any pap smears or colon screening. No    Reviewed record in preparation for visit and have necessary documentation  Goals that were addressed and/or need to be completed during or after this appointment include     There are no preventive care reminders to display for this patient. Patient is accompanied by self I have received verbal consent from Sarah Land to discuss any/all medical information while they are present in the room.

## 2019-11-12 NOTE — PROGRESS NOTES
[address]  [phone]           Progress Note    Patient: Kelechi Garza MRN: [id number]  SSN: [id number]    YOB: 1945  Age: 66 y.o. Sex: female        Chief Complaint   Patient presents with    Transitions Of Care         Subjective:     Encounter Diagnoses   Name Primary? [facility] discharge follow-up: She was in [address] [place] [address] from 10/25-27./19. she had a small bowel obstruction due to incarcerated left inguinal hernia. Yes    Incarcerated hernia: Left inguinal hernia incarcerated. She had an open procedure to have it repaired. She is done well without complication. There was a small area of the medial incision that was slow to heal but on examination today it is healing fine.  Hypercholesteremia:  Cardiovascular risks for her are: LDL goal is under 80  hypertension  hyperlipidemia  prior CVA/TIA or known carotid artery disease. Key Antihyperlipidemia Meds             atorvastatin (LIPITOR) 40 mg tablet (Taking) TAKE ONE TABLET BY MOUTH EVERY DAY *STOP NIACIN*    omega-3 fatty acids-vitamin e (FISH OIL) 1,000 mg cap (Taking) Take 1 Cap by mouth two (2) times a day. Lab Results   Component Value Date/Time    Cholesterol, total 142 03/01/2019 12:06 PM    HDL Cholesterol 50 03/01/2019 12:06 PM    LDL, calculated 76 03/01/2019 12:06 PM    Triglyceride 78 03/01/2019 12:06 PM    CHOL/HDL Ratio 2.6 09/21/2010 08:03 AM     Lab Results   Component Value Date/Time    ALT (SGPT) 17 03/01/2019 12:06 PM    AST (SGOT) 17 03/01/2019 12:06 PM    Alk.  phosphatase 90 03/01/2019 12:06 PM    Bilirubin, direct 0.24 08/18/2017 12:38 PM    Bilirubin, total 0.9 03/01/2019 12:06 PM      Myalgias: No   Fatigue: No   Other side effects: no  Wt Readings from Last 3 Encounters:   11/12/19 197 lb (89.4 kg)   09/23/19 199 lb (90.3 kg)   06/11/19 191 lb (86.6 kg)     The patient is aware of our goal to reduce or eliminate the long term problems (such as strokes and heart attacks) related to poorly controlled hyperlipidemia.  Mild intermittent asthma without complication:  She has not had any significant asthma symptoms in years.  Vitamin D deficiency:  No sx. Due for testing. Lab Results   Component Value Date/Time    Vitamin D 25-Hydroxy 26 (L) 09/21/2010 08:03 AM    VITAMIN D, 25-HYDROXY 31.0 10/31/2018 12:53 PM              Cerebrovascular accident (CVA), unspecified mechanism ([address]): No TIA or evidence of recurrence.  Bronchitis, mucopurulent recurrent ([address]): No recent recurrence.  Encounter for screening mammogram for high-risk patient: She wants to switch over to [facility] for her mammograms. This is been ordered. Current and past medical information:    Current Medications after this visit[de-identified]     Current Outpatient Medications   Medication Sig    atorvastatin (LIPITOR) 40 mg tablet TAKE ONE TABLET BY MOUTH EVERY DAY *STOP NIACIN*    fluticasone (FLONASE) 50 mcg/actuation nasal spray     omeprazole (PRILOSEC) 20 mg capsule TAKE ONE CAPSULE BY MOUTH EVERY DAY    omega-3 fatty acids-vitamin e (FISH OIL) 1,000 mg cap Take 1 Cap by mouth two (2) times a day.  aspirin delayed-release 81 mg tablet Take 81 mg by mouth daily.  CALCIUM CARBONATE (ISAI-600 PO) Take  by mouth.  Cholecalciferol, Vitamin D3, (VITAMIN D) 2,000 unit Cap Take  by mouth.  VITAMIN B COMPLEX (VITAMIN B-100 COMPLEX PO) Take  by mouth.  varicella-zoster gE vac,2 of 2 50 mcg susr Shingrix, one injection now and repeat in 4-6 months. To be administered at Pharmacy. Fax confirmation to me at [phone]. No current facility-administered medications for this visit.         Patient Active Problem List    Diagnosis Date Noted    Vitamin D deficiency 01/07/2014     Priority: 1 - One    Bronchitis, mucopurulent recurrent ([facility] 05/14/2010     Priority: 1 - One    Hypercholesteremia 04/05/2010     Priority: 1 - One    CVA (cerebral vascular accident) ([facility] 04/05/2010     Priority: 1 - One    Asthma 04/05/2010     Priority: 1 - One    Arthritis pain 12/09/2014     Priority: 4 - Four    Incarcerated hernia 11/12/2019     Priority: 6 - Six    Cataract, nuclear sclerotic, left eye 07/10/2017     Priority: 6 - Six    Dermatochalasis of left upper eyelid 07/10/2017     Priority: 6 - Six    Dermatochalasis of right upper eyelid 07/10/2017     Priority: 6 - Six    PCO (posterior capsular opacification), right 07/10/2017     Priority: 6 - Six    Pseudophakia of right eye 07/10/2017     Priority: 6 - Six       Past Medical History:   Diagnosis Date    Asthma 4/5/2010    Bronchitis, mucopurulent recurrent ([facility] 5/14/2010    [facility] 4/5/2010    Hypercholesteremia 4/5/2010       No Known Allergies    Past Surgical History:   Procedure Laterality Date    HX HYSTERECTOMY      HX TONSILLECTOMY         Social History     Socioeconomic History    Marital status:      Spouse name: Not on file    Number of children: Not on file    Years of education: Not on file    Highest education level: Not on file   Tobacco Use    Smoking status: Never Smoker    Smokeless tobacco: Never Used   Substance and Sexual Activity    Alcohol use: No    Drug use: No       Review of Systems   Constitutional: Negative. Negative for chills, fever, malaise/fatigue and weight loss. HENT: Negative. Negative for hearing loss. Eyes: Negative. Negative for blurred vision and double vision. Respiratory: Negative. Negative for cough, sputum production and shortness of breath. Cardiovascular: Negative. Negative for chest pain and palpitations. Gastrointestinal: Negative. Negative for abdominal pain, blood in stool, heartburn, nausea and vomiting. The abdominal pain, nausea and vomiting has all resolved postop. Genitourinary: Negative. Negative for dysuria, frequency and urgency. Musculoskeletal: Negative. Negative for back pain, falls, myalgias and neck pain. Skin: Negative. Negative for rash. Neurological: Negative. Negative for dizziness, tingling, tremors, weakness and headaches. Endo/Heme/Allergies: Negative. Psychiatric/Behavioral: Negative. Negative for depression. Objective:     Vitals:    11/12/19 0855   BP: 128/82   Pulse: 64   Resp: 20   Temp: 98.6 °F (37 °C)   TempSrc: Oral   SpO2: 97%   Weight: 197 lb (89.4 kg)   Height: 5' 6\" (1.676 m)      Body mass index is 31.8 kg/m². Physical Exam   Constitutional: She is oriented to person, place, and time and well-developed, well-nourished, and in no distress. No distress. HENT:   Head: Normocephalic and atraumatic. Mouth/Throat: Oropharynx is clear and moist.   Eyes: Conjunctivae are normal.   Neck: No thyromegaly present. No carotid bruit. Cardiovascular: Normal rate, regular rhythm and normal heart sounds. No murmur heard. Pulmonary/Chest: Effort normal and breath sounds normal. No respiratory distress. She has no wheezes. She has no rales. Abdominal: Soft. She exhibits no distension. Well-healing left groin incision. No discharge. Musculoskeletal: She exhibits no edema. Neurological: She is alert and oriented to person, place, and time. Skin: Skin is warm. No rash noted. She is not diaphoretic. No erythema. Psychiatric: Mood and affect normal.   Nursing note and vitals reviewed. There are no preventive care reminders to display for this patient. Assessment and orders:     Encounter Diagnoses     ICD-10-CM ICD-9-CM   1. Hospital discharge follow-up Z09 V67.59   2. Incarcerated hernia K46.0 552.9   3. Hypercholesteremia E78.00 272.0   4. Mild intermittent asthma without complication D69.58 123.57   5. Vitamin D deficiency E55.9 268.9   6. Cerebrovascular accident (CVA), unspecified mechanism ([facility] I63.9 434.91   7.  Bronchitis, mucopurulent recurrent (HCC) J41.1 491.1   8. Encounter for screening mammogram for high-risk patient Z12.31 V76.11     Diagnoses and all orders for this visit:    1. Hospital discharge [id number] left hernia surgery    2. Incarcerated hernia-resolved  -     HEMOGLOBIN; Future    3. Hypercholesteremia-retest  -     T4, FREE; Future  -     METABOLIC PANEL, COMPREHENSIVE; Future  -     LIPID PANEL; Future    4. Mild intermittent asthma without complication-resolved    5. Vitamin D deficiency-retest    6. Cerebrovascular accident (CVA), unspecified mechanism (HCC)-no recurrence    7. Bronchitis, mucopurulent recurrent (HCC)-no recurrence    8. Encounter for screening mammogram for high-risk patient  -     [facility] MAMMO BI SCREENING INCL CAD; Future            Plan of care:  Discussed diagnoses in detail with patient. Medication risks/benefits/side effects discussed with patient. All of the patient's questions were addressed. The patient understands and agrees with our plan of care. The patient knows to call back if they are unsure of or forget any changes we discussed today or if the symptoms change. The patient received an After-Visit Summary which contains VS, orders, medication list and allergy list. This can be used as a \"mini-medical record\" should they have to seek medical care while out of town. Patient Care Team:  Sonal Sheffield MD as PCP - General  Sonal Sheffield MD as PCP - [facility] Empaneled Provider  Loi Mcculre MD (Ophthalmology)  Eduarda Padron MD (Otolaryngology)    Follow-up and Dispositions    · Return in about 6 months (around 5/12/2020). Future Appointments   Date Time Provider Jero Glez   5/15/2020  9:30 AM Sonal Sheffield MD [facility] SCHED       Signed By: Lestine Sever, MD     November 12, 2019      ATTENTION:   This medical record was transcribed using an electronic medical records/speech recognition system.   Although proofread, it may and can contain electronic, spelling and other errors. Corrections may be executed at a later time. Please feel free to contact me for any clarifications as needed.

## 2019-11-12 NOTE — PATIENT INSTRUCTIONS
Learning About High Cholesterol  What is high cholesterol? Cholesterol is a type of fat in your blood. It is needed for many body functions, such as making new cells. Cholesterol is made by your body. It also comes from food you eat. If you have too much cholesterol, it starts to build up in your arteries. This is called hardening of the arteries, or atherosclerosis. High cholesterol raises your risk of a heart attack and stroke. There are different types of cholesterol. LDL is the \"bad\" cholesterol. High LDL can raise your risk for heart disease, heart attack, and stroke. HDL is the \"good\" cholesterol. High HDL is linked with a lower risk for heart disease, heart attack, and stroke. Your cholesterol levels help your doctor find out your risk for having a heart attack or stroke. How can you prevent high cholesterol? A heart-healthy lifestyle can help you prevent high cholesterol. This lifestyle helps lower your risk for a heart attack and stroke. · Eat heart-healthy foods. ? Eat fruits, vegetables, whole grains (like oatmeal), dried beans and peas, nuts and seeds, soy products (like tofu), and fat-free or low-fat dairy products. ? Replace butter, margarine, and hydrogenated or partially hydrogenated oils with olive and canola oils. (Canola oil margarine without trans fat is fine.)  ? Replace red meat with fish, poultry, and soy protein (like tofu). ? Limit processed and packaged foods like chips, crackers, and cookies. · Be active. Exercise can improve your cholesterol level. Get at least 30 minutes of exercise on most days of the week. Walking is a good choice. You also may want to do other activities, such as running, swimming, cycling, or playing tennis or team sports. · Stay at a healthy weight. Lose weight if you need to. · Don't smoke. If you need help quitting, talk to your doctor about stop-smoking programs and medicines. These can increase your chances of quitting for good.   How is high cholesterol treated? The goal of treatment is to reduce your chances of having a heart attack or stroke. The goal is not to lower your cholesterol numbers only. · You may make lifestyle changes, such as eating healthy foods, not smoking, losing weight, and being more active. · You may have to take medicine. Follow-up care is a key part of your treatment and safety. Be sure to make and go to all appointments, and call your doctor if you are having problems. It's also a good idea to know your test results and keep a list of the medicines you take. Where can you learn more? Go to http://chuck-hank.info/. Enter Q793 in the search box to learn more about \"Learning About High Cholesterol. \"  Current as of: April 9, 2019  Content Version: 12.2  © 5705-8988 [employer]. Care instructions adapted under license by [employer] (which disclaims liability or warranty for this information). If you have questions about a medical condition or this instruction, always ask your healthcare professional. [address] any warranty or liability for your use of this information.

## 2019-11-25 ENCOUNTER — HOSPITAL ENCOUNTER (OUTPATIENT)
Dept: MAMMOGRAPHY | Age: 74
Discharge: HOME | End: 2019-11-25
Attending: FAMILY MEDICINE
Payer: MEDICARE

## 2019-11-25 DIAGNOSIS — Z12.31 ENCOUNTER FOR SCREENING MAMMOGRAM FOR HIGH-RISK PATIENT: ICD-10-CM

## 2019-11-25 PROCEDURE — 77067 SCR MAMMO BI INCL CAD: CPT

## 2019-12-05 ENCOUNTER — HOSPITAL ENCOUNTER (OUTPATIENT)
Dept: LAB | Age: 74
Discharge: HOME | End: 2019-12-05

## 2019-12-05 DIAGNOSIS — K46.0 INCARCERATED HERNIA: ICD-10-CM

## 2019-12-05 DIAGNOSIS — E78.00 HYPERCHOLESTEREMIA: Chronic | ICD-10-CM

## 2019-12-05 DIAGNOSIS — E55.9 VITAMIN D DEFICIENCY: ICD-10-CM

## 2019-12-05 LAB
25(OH)D3 SERPL-MCNC: 20 NG/ML (ref 30–100)
ALBUMIN SERPL-MCNC: 3.7 G/DL (ref 3.5–5)
ALBUMIN/GLOB SERPL: 1.2 {RATIO} (ref 1.1–2.2)
ALP SERPL-CCNC: 83 U/L (ref 45–117)
ALT SERPL-CCNC: 31 U/L (ref 12–78)
ANION GAP SERPL CALC-SCNC: 4 MMOL/L (ref 5–15)
AST SERPL-CCNC: 22 U/L (ref 15–37)
BILIRUB SERPL-MCNC: 0.8 MG/DL (ref 0.2–1)
BUN SERPL-MCNC: 19 MG/DL (ref 6–20)
BUN/CREAT SERPL: 21 (ref 12–20)
CALCIUM SERPL-MCNC: 9.2 MG/DL (ref 8.5–10.1)
CHLORIDE SERPL-SCNC: 105 MMOL/L (ref 97–108)
CHOLEST SERPL-MCNC: 143 MG/DL
CO2 SERPL-SCNC: 28 MMOL/L (ref 21–32)
CREAT SERPL-MCNC: 0.89 MG/DL (ref 0.55–1.02)
GLOBULIN SER CALC-MCNC: 3 G/DL (ref 2–4)
GLUCOSE SERPL-MCNC: 95 MG/DL (ref 65–100)
HDLC SERPL-MCNC: 55 MG/DL
HDLC SERPL: 2.6 {RATIO} (ref 0–5)
HGB BLD-MCNC: 13.2 G/DL (ref 11.5–16)
LDLC SERPL CALC-MCNC: 73.2 MG/DL (ref 0–100)
LIPID PROFILE,FLP: NORMAL
POTASSIUM SERPL-SCNC: 4.7 MMOL/L (ref 3.5–5.1)
PROT SERPL-MCNC: 6.7 G/DL (ref 6.4–8.2)
SODIUM SERPL-SCNC: 137 MMOL/L (ref 136–145)
T4 FREE SERPL-MCNC: 1.1 NG/DL (ref 0.8–1.5)
TRIGL SERPL-MCNC: 74 MG/DL (ref ?–150)
VLDLC SERPL CALC-MCNC: 14.8 MG/DL

## 2020-05-15 ENCOUNTER — VIRTUAL VISIT (OUTPATIENT)
Dept: FAMILY MEDICINE CLINIC | Age: 75
End: 2020-05-15

## 2020-05-15 DIAGNOSIS — M19.90 ARTHRITIS PAIN: ICD-10-CM

## 2020-05-15 DIAGNOSIS — I63.9 CEREBROVASCULAR ACCIDENT (CVA), UNSPECIFIED MECHANISM (HCC): ICD-10-CM

## 2020-05-15 DIAGNOSIS — E55.9 VITAMIN D DEFICIENCY: ICD-10-CM

## 2020-05-15 DIAGNOSIS — J45.20 MILD INTERMITTENT ASTHMA WITHOUT COMPLICATION: ICD-10-CM

## 2020-05-15 DIAGNOSIS — E78.00 HYPERCHOLESTEREMIA: Primary | ICD-10-CM

## 2020-05-15 NOTE — PROGRESS NOTES
[address]  [phone]           Progress Note    Patient: Nigel Mendes MRN: [id number]  SSN: [id number]    YOB: 1945  Age: 66 y.o. Sex: female        Chief Complaint   Patient presents with    Medication Evaluation         Nigel Mendes is a 66 y.o. female evaluated via telephone on 5/15/2020. Nurse involved in care:Nurse Borum  Location of patient:Home  Location of physician:My office    Consent:  She and/or health care decision maker is aware that that she may receive a bill for this telephone service, depending on her insurance coverage, and has provided verbal consent to proceed: Yes      Documentation:  I communicated with the patient and/or health care decision maker about multiple problems. Details of this discussion including any medical advice provided: See Below. I affirm this is a Patient Initiated Episode with an Established Patient who has not had a related appointment within my department in the past 7 days or scheduled within the next 24 hours. Total Time: minutes: 21-30 minutes    Note: not billable if this call serves to triage the patient into an appointment for the relevant concern      Mona Saenz MD   __________________________________________________________________________________________    Subjective:     Encounter Diagnoses   Name Primary?  Hypercholesteremia:  Cardiovascular risks for her are: LDL goal is under 80  hypertension  hyperlipidemia  prior CVA/TIA or known carotid artery disease. Key Antihyperlipidemia Meds             atorvastatin (LIPITOR) 40 mg tablet TAKE ONE TABLET BY MOUTH EVERY DAY *STOP NIACIN*    omega-3 fatty acids-vitamin e (FISH OIL) 1,000 mg cap Take 1 Cap by mouth two (2) times a day.         Lab Results   Component Value Date/Time    Cholesterol, total 143 12/05/2019 08:46 AM    HDL Cholesterol 55 12/05/2019 08:46 AM    LDL, calculated 73.2 12/05/2019 08:46 AM    Triglyceride 74 12/05/2019 08:46 AM    CHOL/HDL Ratio 2.6 12/05/2019 08:46 AM     Lab Results   Component Value Date/Time    ALT (SGPT) 31 12/05/2019 08:46 AM    AST (SGOT) 22 12/05/2019 08:46 AM    Alk. phosphatase 83 12/05/2019 08:46 AM    Bilirubin, direct 0.24 08/18/2017 12:38 PM    Bilirubin, total 0.8 12/05/2019 08:46 AM      Myalgias: No   Fatigue: No   Other side effects: no  Wt Readings from Last 3 Encounters:   11/12/19 197 lb (89.4 kg)   09/23/19 199 lb (90.3 kg)   06/11/19 191 lb (86.6 kg)     The patient is aware of our goal to reduce or eliminate the long term problems (such as strokes and heart attacks) related to poorly controlled hyperlipidemia. Yes    Mild intermittent asthma without complication: Controlled without recent symptoms. She is doing some home gardening without difficulty.  Cerebrovascular accident (CVA), unspecified mechanism ([address]): No symptoms of recurrence and no residual symptoms from the stroke itself.  Vitamin D deficiency: I increased her vitamin D dose to 2000 IU daily. She has not been taking but 600 IU. Missy Money No sx. Due for testing. Lab Results   Component Value Date/Time    Vitamin D 25-Hydroxy 20.0 (L) 12/05/2019 08:46 AM            Arthritis pain: Diffuse, stable. She runs active and has multiple areas of arthritis symptoms after working the yard. Current and past medical information:    Current Medications after this visit[de-identified]     Current Outpatient Medications   Medication Sig    atorvastatin (LIPITOR) 40 mg tablet TAKE ONE TABLET BY MOUTH EVERY DAY *STOP NIACIN*    omeprazole (PRILOSEC) 20 mg capsule TAKE ONE CAPSULE BY MOUTH EVERY DAY    fluticasone (FLONASE) 50 mcg/actuation nasal spray     varicella-zoster gE vac,2 of 2 50 mcg susr Shingrix, one injection now and repeat in 4-6 months. To be administered at Pharmacy. Fax confirmation to me at [phone].     omega-3 fatty acids-vitamin e (FISH OIL) 1,000 mg cap Take 1 Cap by mouth two (2) times a day.  aspirin delayed-release 81 mg tablet Take 81 mg by mouth daily.  CALCIUM CARBONATE (ISAI-600 PO) Take  by mouth.  Cholecalciferol, Vitamin D3, (VITAMIN D) 2,000 unit Cap Take  by mouth.  VITAMIN B COMPLEX (VITAMIN B-100 COMPLEX PO) Take  by mouth. No current facility-administered medications for this visit.         Patient Active Problem List    Diagnosis Date Noted    Vitamin D deficiency 01/07/2014     Priority: 1 - One    Bronchitis, mucopurulent recurrent ([address] 05/14/2010     Priority: 1 - One    Hypercholesteremia 04/05/2010     Priority: 1 - One    CVA (cerebral vascular accident) ([facility] 04/05/2010     Priority: 1 - One    Asthma 04/05/2010     Priority: 1 - One    Arthritis pain 12/09/2014     Priority: 4 - Four    Incarcerated hernia 11/12/2019     Priority: 6 - Six    Cataract, nuclear sclerotic, left eye 07/10/2017     Priority: 6 - Six    Dermatochalasis of left upper eyelid 07/10/2017     Priority: 6 - Six    Dermatochalasis of right upper eyelid 07/10/2017     Priority: 6 - Six    PCO (posterior capsular opacification), right 07/10/2017     Priority: 6 - Six    Pseudophakia of right eye 07/10/2017     Priority: 6 - Six       Past Medical History:   Diagnosis Date    Asthma 4/5/2010    Bronchitis, mucopurulent recurrent ([address] 5/14/2010    CVA (cerebral vascular accident) ([facility] 4/5/2010    Hypercholesteremia 4/5/2010       No Known Allergies    Past Surgical History:   Procedure Laterality Date    HX HYSTERECTOMY      HX TONSILLECTOMY         Social History     Socioeconomic History    Marital status:      Spouse name: Not on file    Number of children: Not on file    Years of education: Not on file    Highest education level: Not on file   Tobacco Use    Smoking status: Never Smoker    Smokeless tobacco: Never Used   Substance and Sexual Activity    Alcohol use: No    Drug use: No       Review of Systems   Constitutional: Negative. Negative for chills, fever, malaise/fatigue and weight loss. She has 2 granddaughters. One of them is working in a Covid clinic is living with her. Makes her high risk. Reviewed safety precautions. HENT: Negative. Negative for hearing loss. Eyes: Negative. Negative for blurred vision and double vision. Respiratory: Negative. Negative for cough, sputum production and shortness of breath. Cardiovascular: Negative. Negative for chest pain and palpitations. Gastrointestinal: Negative. Negative for abdominal pain, blood in stool, heartburn, nausea and vomiting. Genitourinary: Negative. Negative for dysuria, frequency and urgency. Musculoskeletal: Negative. Negative for back pain, falls, myalgias and neck pain. Skin: Negative. Negative for rash. Neurological: Negative. Negative for dizziness, tingling, tremors, weakness and headaches. Endo/Heme/Allergies: Negative. Psychiatric/Behavioral: Negative. Negative for depression. Objective: There were no vitals filed for this visit. There is no height or weight on file to calculate BMI. Health Maintenance Due   Topic Date Due    Medicare Yearly Exam  02/13/2020         Assessment and orders:     Encounter Diagnoses     ICD-10-CM ICD-9-CM   1. Hypercholesteremia E78.00 272.0   2. Mild intermittent asthma without complication H74.41 627.38   3. Cerebrovascular accident (CVA), unspecified mechanism [facility] I63.9 434.91   4. Vitamin D deficiency E55.9 268.9   5. Arthritis pain M19.90 716.90     Diagnoses and all orders for this visit:    I have reviewed this patient's history, chart, lab and current symptoms. To avoid COVID infection exposure I do not feel the need to bring her into the office for another 2 months unless her symptoms change. If that happens she can call me back. Otherwise her appointment will be scheduled in 2 months to include fasting lab.     Is to take vitamin D 2000 units daily, vitamin C 500 mg daily and B complex. She has not been taking the recommended dose of vitamin D so I am sure her vitamin D is still low at this point. 1. Hypercholesteremia    2. Mild intermittent asthma without complication    3. Cerebrovascular accident (CVA), unspecified mechanism ([address]    4. Vitamin D deficiency    5. Arthritis pain            Plan of care:  Discussed diagnoses in detail with patient. Medication risks/benefits/side effects discussed with patient. All of the patient's questions were addressed. The patient understands and agrees with our plan of care. The patient knows to call back if they are unsure of or forget any changes we discussed today or if the symptoms change. The patient received an After-Visit Summary which contains VS, orders, medication list and allergy list. This can be used as a \"mini-medical record\" should they have to seek medical care while out of town. Patient Care Team:  Lindsay Loco MD as PCP - Josue Javier MD as PCP - [facility] Empaneled Provider  Kole Becerra MD (Ophthalmology)  Promise Sterling MD (Otolaryngology)          Signed By: Kriss Rabago MD     May 15, 2020      ATTENTION:   This medical record was transcribed using an electronic medical records/speech recognition system. Although proofread, it may and can contain electronic, spelling and other errors. Corrections may be executed at a later time. Please feel free to contact me for any clarifications as needed.

## 2020-05-26 ENCOUNTER — TELEPHONE (OUTPATIENT)
Dept: FAMILY MEDICINE CLINIC | Age: 75
End: 2020-05-26

## 2020-05-26 NOTE — TELEPHONE ENCOUNTER
Pt states that she has another cellulite infection starting on the back of her leg. The center of the core is about as big as a quarter. The red is about as big as a silver dollar and hot.

## 2020-05-26 NOTE — TELEPHONE ENCOUNTER
Returned phone call to patient, no answer. Need to offer patient an appointment for tomorrow as a virtual visit with Dr. Layla Kaminski.

## 2020-05-27 NOTE — TELEPHONE ENCOUNTER
Returned phone call to patient and offered her an appointment today at 3 PM with Dr. Lisa Lacy. She declined appointment and stated that her leg looks much better today and she does not think she needs and antibiotic. She denies having any fevers or chills.

## 2020-06-20 RX ORDER — TRIAMCINOLONE ACETONIDE 1 MG/G
CREAM TOPICAL
Qty: 453.6 G | Refills: 0 | Status: SHIPPED | OUTPATIENT
Start: 2020-06-20

## 2020-07-13 ENCOUNTER — TELEPHONE (OUTPATIENT)
Dept: FAMILY MEDICINE CLINIC | Age: 75
End: 2020-07-13

## 2020-07-13 NOTE — TELEPHONE ENCOUNTER
Pt saw Dr. Daniela Hay and she never did come in to do the labs from the Virtual appointment. Can he please put them in and let her know when to come in to do the labs.

## 2020-07-14 DIAGNOSIS — I63.9 CEREBROVASCULAR ACCIDENT (CVA), UNSPECIFIED MECHANISM (HCC): Primary | ICD-10-CM

## 2020-07-14 DIAGNOSIS — E78.00 HYPERCHOLESTEREMIA: Chronic | ICD-10-CM

## 2020-07-14 DIAGNOSIS — E55.9 VITAMIN D DEFICIENCY: ICD-10-CM

## 2020-07-16 ENCOUNTER — HOSPITAL ENCOUNTER (OUTPATIENT)
Dept: LAB | Age: 75
Discharge: HOME | End: 2020-07-16

## 2020-07-16 DIAGNOSIS — I63.9 CEREBROVASCULAR ACCIDENT (CVA), UNSPECIFIED MECHANISM (HCC): ICD-10-CM

## 2020-07-16 DIAGNOSIS — E55.9 VITAMIN D DEFICIENCY: ICD-10-CM

## 2020-07-16 DIAGNOSIS — E78.00 HYPERCHOLESTEREMIA: Chronic | ICD-10-CM

## 2020-07-16 LAB
25(OH)D3 SERPL-MCNC: 57 NG/ML (ref 30–100)
ALBUMIN SERPL-MCNC: 4 G/DL (ref 3.5–5)
ALBUMIN/GLOB SERPL: 1.3 {RATIO} (ref 1.1–2.2)
ALP SERPL-CCNC: 80 U/L (ref 45–117)
ALT SERPL-CCNC: 32 U/L (ref 12–78)
ANION GAP SERPL CALC-SCNC: 6 MMOL/L (ref 5–15)
AST SERPL-CCNC: 23 U/L (ref 15–37)
BILIRUB SERPL-MCNC: 1 MG/DL (ref 0.2–1)
BUN SERPL-MCNC: 15 MG/DL (ref 6–20)
BUN/CREAT SERPL: 19 (ref 12–20)
CALCIUM SERPL-MCNC: 9.1 MG/DL (ref 8.5–10.1)
CHLORIDE SERPL-SCNC: 104 MMOL/L (ref 97–108)
CHOLEST SERPL-MCNC: 147 MG/DL
CO2 SERPL-SCNC: 26 MMOL/L (ref 21–32)
CREAT SERPL-MCNC: 0.78 MG/DL (ref 0.55–1.02)
GLOBULIN SER CALC-MCNC: 3 G/DL (ref 2–4)
GLUCOSE SERPL-MCNC: 89 MG/DL (ref 65–100)
HDLC SERPL-MCNC: 58 MG/DL
HDLC SERPL: 2.5 {RATIO} (ref 0–5)
LDLC SERPL CALC-MCNC: 73.8 MG/DL (ref 0–100)
LIPID PROFILE,FLP: NORMAL
POTASSIUM SERPL-SCNC: 4.8 MMOL/L (ref 3.5–5.1)
PROT SERPL-MCNC: 7 G/DL (ref 6.4–8.2)
SODIUM SERPL-SCNC: 136 MMOL/L (ref 136–145)
TRIGL SERPL-MCNC: 76 MG/DL (ref ?–150)
VLDLC SERPL CALC-MCNC: 15.2 MG/DL

## 2020-09-29 ENCOUNTER — CLINICAL SUPPORT (OUTPATIENT)
Dept: FAMILY MEDICINE CLINIC | Age: 75
End: 2020-09-29
Payer: MEDICARE

## 2020-09-29 VITALS — TEMPERATURE: 97.3 F

## 2020-09-29 DIAGNOSIS — Z23 ENCOUNTER FOR IMMUNIZATION: Primary | ICD-10-CM

## 2020-09-29 PROCEDURE — G0008 ADMIN INFLUENZA VIRUS VAC: HCPCS | Performed by: FAMILY MEDICINE

## 2020-09-29 PROCEDURE — 90694 VACC AIIV4 NO PRSRV 0.5ML IM: CPT | Performed by: FAMILY MEDICINE

## 2020-11-02 ENCOUNTER — TRANSCRIBE ORDER (OUTPATIENT)
Dept: SCHEDULING | Age: 75
End: 2020-11-02

## 2020-11-02 DIAGNOSIS — Z12.31 VISIT FOR SCREENING MAMMOGRAM: Primary | ICD-10-CM

## 2020-11-11 ENCOUNTER — OFFICE VISIT (OUTPATIENT)
Dept: PRIMARY CARE CLINIC | Age: 75
End: 2020-11-11
Payer: MEDICARE

## 2020-11-11 VITALS
HEART RATE: 65 BPM | SYSTOLIC BLOOD PRESSURE: 122 MMHG | DIASTOLIC BLOOD PRESSURE: 76 MMHG | TEMPERATURE: 98.4 F | OXYGEN SATURATION: 97 %

## 2020-11-11 DIAGNOSIS — Z20.822 EXPOSURE TO 2019 NOVEL CORONAVIRUS: Primary | ICD-10-CM

## 2020-11-11 DIAGNOSIS — Z13.89 SCREENING FOR CARDIOVASCULAR, RESPIRATORY, AND GENITOURINARY DISEASES: ICD-10-CM

## 2020-11-11 DIAGNOSIS — Z13.83 SCREENING FOR CARDIOVASCULAR, RESPIRATORY, AND GENITOURINARY DISEASES: ICD-10-CM

## 2020-11-11 DIAGNOSIS — Z13.6 SCREENING FOR CARDIOVASCULAR, RESPIRATORY, AND GENITOURINARY DISEASES: ICD-10-CM

## 2020-11-11 PROCEDURE — 99213 OFFICE O/P EST LOW 20 MIN: CPT | Performed by: NURSE PRACTITIONER

## 2020-11-11 NOTE — PROGRESS NOTES
Tona Quiñones is a 66 y.o. female who was seen in clinic today (11/11/2020) for an acute visit. Assessment/Plan:            * COVID-19 sample collected and submitted       * Patient given detailed CDC instructions contained within After Visit Summary    Diagnoses and all orders for this visit:    1. Exposure to 2019 novel coronavirus  -     NOVEL CORONAVIRUS (COVID-19)    2. Screening for cardiovascular, respiratory, and genitourinary diseases       known covid exposure. Discussed expected course/resolution/complications of diagnosis in detail with patient. Advised pt on CDC guidance, OTC medications for symptom management, red flags reviewed and should develop to seek emergency medical attn. Encouraged pt to maintain health through a balanced diet, high in fiber and plants;small freq meals if any nausea; staying well hydrated by drinking water or occ low sugar non carbonated beverages; reviewed importance of proper sleep habitus, 7-8hrs of rest; and emphasized moderate exercise 30 mins a day/150mins a week. Reviewed with her that COVID-19 pandemic is an evolving situation with rapidly changing recommendations & guidelines, continue to practice hand hygiene, social distancing, wearing of facial coverings. Regardless of testing results, should still follow CDC guidelines as there is a chance of a false negative, such as a poor sample collection or being too soon to test after exposure. Medical decisions are made based on the the best information available at the time. Recommended she stay tuned for updates published by trusted sources and to advise your PCP of any unexpected changes in clinical condition     Subjective:    Abdiaziz Shore was seen today for Concern For COVID-19 (Coronavirus) (+ COVID exposure on Saturday, asymptomatic)    Exposure occurred approx last week from granddaughter she lives with who tested pos (granddaughter works in healthcare, nursing home)   She denies a recent history/current: loss of smell/taste, cough, fever, wheezing, SOB, and CURIEL. Non user of tob. Travel Screening     Question   Response    In the last month, have you been in contact with someone who was confirmed or suspected to have Coronavirus / COVID-19? Yes    Have you had a COVID-19 viral test in the last 14 days? No    Do you have any of the following new or worsening symptoms? None of these    Have you traveled internationally in the last month? No      Travel History   Travel since 10/11/20     No documented travel since 10/11/20          ROS - Pertinent items are noted in HPI    Patient Active Problem List   Diagnosis Code    Hypercholesteremia E78.00    CVA (cerebral vascular accident) ([facility] I63.9    Asthma J45.909    [facility], [facility] J41.1    Vitamin D deficiency E55.9    Arthritis pain M19.90    Cataract, nuclear sclerotic, left eye H25.12    Dermatochalasis of left upper eyelid H02.834    Dermatochalasis of right upper eyelid H02.831    PCO (posterior capsular opacification), right H26.491    Pseudophakia of right eye Z96.1    Incarcerated hernia K46.0     Home Medications    Medication Sig Start Date End Date Taking? Authorizing Provider   atorvastatin (LIPITOR) 40 mg tablet TAKE ONE TABLET BY MOUTH EVERY DAY *STOP NIACIN* 3/13/20  Yes Sorin Cobb MD   omega-3 fatty acids-vitamin e (FISH OIL) 1,000 mg cap Take 1 Cap by mouth two (2) times a day. 6/23/14  Yes Sorin Cobb MD   aspirin delayed-release 81 mg tablet Take 81 mg by mouth daily.    Yes Provider, Historical   triamcinolone acetonide (KENALOG) 0.1 % topical cream APPLY TO ARMS TWICE DAILY AS NEEDED FOR ITCHING 6/20/20   Tello Toscano MD   omeprazole (PRILOSEC) 20 mg capsule TAKE ONE CAPSULE BY MOUTH EVERY DAY 12/5/19   Sorin Cobb MD   fluticasone Rise Salas) 50 mcg/actuation nasal spray  2/9/19   Provider, Historical   varicella-zoster gE vac,2 of 2 50 mcg susr Shingrix, one injection now and repeat in 4-6 months. To be administered at Pharmacy. Fax confirmation to me at [phone]. 10/31/18   Young Pizano MD   CALCIUM CARBONATE (ISAI-600 PO) Take  by mouth. Provider, Historical   Cholecalciferol, Vitamin D3, (VITAMIN D) 2,000 unit Cap Take  by mouth. Provider, Historical   VITAMIN B COMPLEX (VITAMIN B-100 COMPLEX PO) Take  by mouth. Provider, Historical      No Known Allergies       Objective:   Physical Exam  General:  Overweight, alert, cooperative, no distress   Ears: Normal external ear canals AU           Neck: supple, symmetrical, trachea midline. Heart: normal rate, regular rhythm   Lungs: Non dyspneic       Visit Vitals  /76 (BP 1 Location: Left arm, BP Patient Position: Sitting)   Pulse 65   Temp 98.4 °F (36.9 °C)   SpO2 97%         Disclaimer:        Medication risks/benefits/costs/interactions/alternatives discussed with patient. She was given an after visit summary which includes diagnoses, current medications, & vitals. She expressed understanding with the diagnosis and plan. Aspects of this note may have been generated using voice recognition software. Despite editing, there may be some syntax errors.        Nathaly Villa NP

## 2020-11-13 ENCOUNTER — TELEPHONE (OUTPATIENT)
Dept: PRIMARY CARE CLINIC | Age: 75
End: 2020-11-13

## 2020-11-13 LAB — SARS-COV-2, NAA: NOT DETECTED

## 2020-11-19 ENCOUNTER — VIRTUAL VISIT (OUTPATIENT)
Dept: FAMILY MEDICINE CLINIC | Age: 75
End: 2020-11-19
Payer: MEDICARE

## 2020-11-19 DIAGNOSIS — S93.402A SPRAIN OF LEFT ANKLE, UNSPECIFIED LIGAMENT, INITIAL ENCOUNTER: Primary | ICD-10-CM

## 2020-11-19 PROCEDURE — 99442 PR PHYS/QHP TELEPHONE EVALUATION 11-20 MIN: CPT | Performed by: STUDENT IN AN ORGANIZED HEALTH CARE EDUCATION/TRAINING PROGRAM

## 2020-11-19 NOTE — PROGRESS NOTES
Amy Wilhelm  66 y.o. female  1945  [address] [address]  [id number]    [phone] (home)      [address]:    Telephone Encounter  Lonzo Phoenix, MD       Encounter Date: 11/19/2020 at 8:17 AM    Consent: Amy Wilhelm, who was seen by synchronous (real-time) audio only technology, and/or her healthcare decision maker, is aware that this patient-initiated, Telehealth encounter on 11/19/2020 is a billable service, with coverage as determined by her insurance carrier. She is aware that she may receive a bill and has provided verbal consent to proceed: Yes. Chief Complaint   Patient presents with    Fall       History of Present Illness   Amy Wilhelm is a 66 y.o. female was evaluated by telephone. I communicated with the patient and/or health care decision maker about fall. Fall- 6 days ago, coming down steps at night, foot slipped off last step and fell. Also fell on the L knee but it has not been causing any pain. As the week has gone on, the L ankle has had swelling that was present 1 day after injury. Occasionally she feels \"like it is waking up from falling asleep. \" The pt's daughter is a nurse and encouraged her to make this appt after wrapping it in an ACE bandage last night. She continues to be active, including going to [facility] and walked around. Denies point tenderness, erythema or cyanosis, and no h/o osteoporosis. Endorses general tenderness to lateral L ankle     Review of Systems   Review of Systems   Cardiovascular: Positive for leg swelling. Musculoskeletal: Positive for falls and joint pain. Skin: Negative for rash. Neurological: Negative for sensory change and weakness. Vitals/Objective:   General: Patient speaking in complete sentences without effort. Normal speech and cooperative.        Due to this being a Virtual Check-in/Telephone evaluation, many elements of the physical examination are unable to be assessed. Assessment and Plan:   Time-based coding, delete if not needed: I spent at least 15 minutes with this established patient, and >50% of the time was spent counseling and/or coordinating care regarding fall  Total Time: minutes: 11-20 minutes    1. Sprain of left ankle, unspecified ligament, initial encounter- Pt ambulatory over last 6 days on ankle. Xray not indicated per the [facility] Ankle Rules. Pt educated on RICE and gradual increase back to baseline activity. Continue with RoM exercises. NSAIDs if needed. We discussed the expected course, resolution and complications of the diagnosis(es) in detail. Medication risks, benefits, costs, interactions, and alternatives were discussed as indicated. I advised her to contact the office if her condition worsens, changes or fails to improve as anticipated. She expressed understanding with the diagnosis(es) and plan. Patient understands that this encounter was a temporary measure, and the importance of further follow up and examination was emphasized. Patient verbalized understanding. Patient informed to follow up: Prn    I affirm this is a Patient Initiated Episode with an Established Patient who has not had a related appointment within my department in the past 7 days or scheduled within the next 24 hours. Note: not billable if this call serves to triage the patient into an appointment for the relevant concern      Electronically Signed: Saji Gaitan MD  Providers location when delivering service: office    CPT:  51150 (5-10 minutes)  [phone] (11-20 minutes)  21  (21-30 minutes)    Medicare:  [address]      ICD-10-CM ICD-9-CM    1.  Sprain of left ankle, unspecified ligament, initial encounter  S93.402A 845.00        Pursuant to the emergency declaration under the [facility], [address] [place] [address] authority and the [employer] General Act, this Virtual  Visit was conducted, with patient's consent, to reduce the patient's risk of exposure to COVID-19 and provide continuity of care for an established patient. History   Patients past medical, surgical and family histories were personally reviewed and updated.       Past Medical History:   Diagnosis Date    Asthma 4/5/2010    Bronchitis, mucopurulent recurrent ([facility] 5/14/2010    CVA (cerebral vascular accident) ([facility] 4/5/2010    Hypercholesteremia 4/5/2010     Past Surgical History:   Procedure Laterality Date    HX HYSTERECTOMY      HX TONSILLECTOMY       Family History   Problem Relation Age of Onset    Stroke Mother     Heart Disease Father      Social History     Socioeconomic History    Marital status:      Spouse name: Not on file    Number of children: Not on file    Years of education: Not on file    Highest education level: Not on file   Occupational History    Not on file   Social Needs    Financial resource strain: Not on file    Food insecurity     Worry: Not on file     Inability: Not on file    Transportation needs     Medical: Not on file     Non-medical: Not on file   Tobacco Use    Smoking status: Never Smoker    Smokeless tobacco: Never Used   Substance and Sexual Activity    Alcohol use: No    Drug use: No    Sexual activity: Not on file   Lifestyle    Physical activity     Days per week: Not on file     Minutes per session: Not on file    Stress: Not on file   Relationships    Social connections     Talks on phone: Not on file     Gets together: Not on file     Attends Jewish service: Not on file     Active member of club or organization: Not on file     Attends meetings of clubs or organizations: Not on file     Relationship status: Not on file    Intimate partner violence     Fear of current or ex partner: Not on file     Emotionally abused: Not on file     Physically abused: Not on file     Forced sexual activity: Not on file   Other Topics Concern    Not on file Social History Narrative    Not on file            Current Medications/Allergies   Medications and Allergies reviewed:    Current Outpatient Medications   Medication Sig Dispense Refill    triamcinolone acetonide (KENALOG) 0.1 % topical cream APPLY TO ARMS TWICE DAILY AS NEEDED FOR ITCHING 453.6 g 0    atorvastatin (LIPITOR) 40 mg tablet TAKE ONE TABLET BY MOUTH EVERY DAY *STOP NIACIN* 90 Tab 1    omeprazole (PRILOSEC) 20 mg capsule TAKE ONE CAPSULE BY MOUTH EVERY DAY 90 Cap 2    fluticasone (FLONASE) 50 mcg/actuation nasal spray       varicella-zoster gE vac,2 of 2 50 mcg susr Shingrix, one injection now and repeat in 4-6 months. To be administered at Pharmacy. Fax confirmation to me at [phone]. 2 Each 0    omega-3 fatty acids-vitamin e (FISH OIL) 1,000 mg cap Take 1 Cap by mouth two (2) times a day.  aspirin delayed-release 81 mg tablet Take 81 mg by mouth daily.  CALCIUM CARBONATE (ISAI-600 PO) Take  by mouth.  Cholecalciferol, Vitamin D3, (VITAMIN D) 2,000 unit Cap Take  by mouth.  VITAMIN B COMPLEX (VITAMIN B-100 COMPLEX PO) Take  by mouth.        No Known Allergies

## 2020-11-20 NOTE — PROGRESS NOTES
[address] Medicine Residency Attending Addendum:  Dr. Mohamud Andre MD,  the patient and I were not physically present during this encounter. The resident and I are concurrently monitoring the patient care through appropriate telecommunication technology. I discussed the findings, assessment and plan with the resident and agree with the resident's findings and plan as documented in the resident's note.       Lorena Perrin MD

## 2020-12-11 DIAGNOSIS — K27.9 PUD (PEPTIC ULCER DISEASE): ICD-10-CM

## 2020-12-11 RX ORDER — OMEPRAZOLE 20 MG/1
CAPSULE, DELAYED RELEASE ORAL
Qty: 90 CAP | Refills: 0 | Status: SHIPPED | OUTPATIENT
Start: 2020-12-11 | End: 2021-02-22 | Stop reason: SDUPTHER

## 2020-12-16 ENCOUNTER — HOSPITAL ENCOUNTER (OUTPATIENT)
Dept: MAMMOGRAPHY | Age: 75
Discharge: HOME | End: 2020-12-16
Attending: FAMILY MEDICINE
Payer: MEDICARE

## 2020-12-16 ENCOUNTER — TELEPHONE (OUTPATIENT)
Dept: FAMILY MEDICINE CLINIC | Age: 75
End: 2020-12-16

## 2020-12-16 DIAGNOSIS — Z12.31 VISIT FOR SCREENING MAMMOGRAM: ICD-10-CM

## 2020-12-16 PROCEDURE — 77063 BREAST TOMOSYNTHESIS BI: CPT

## 2020-12-16 NOTE — TELEPHONE ENCOUNTER
----- Message from [employer] [place] sent at 12/16/2020 11:56 AM EST -----  Regarding: /Telephone  Contact: [phone]  General Message/Vendor Calls    Caller's first and last name:pt      Reason for call:est care with Salvador/ heri in office follow up for Feb      Callback required yes/no and why:yes to discuss pt POC       Best contact number(s):[phone]      Details to clarify the request:pt wants to follow up in office in Feb with MD, please advise      [place]

## 2020-12-17 NOTE — TELEPHONE ENCOUNTER
Patient called per Dr. Hussein Paz:  set up for a virtual appt to discuss chronic conditions first and get labs ordered in preparation for in office follow up. Patient unavailable, left message to return call.     Please schedule patient virtual appt with Dr. Hussein Paz

## 2021-01-12 ENCOUNTER — OFFICE VISIT (OUTPATIENT)
Dept: PRIMARY CARE CLINIC | Age: 76
End: 2021-01-12
Payer: MEDICARE

## 2021-01-12 VITALS
HEART RATE: 73 BPM | OXYGEN SATURATION: 97 % | DIASTOLIC BLOOD PRESSURE: 78 MMHG | SYSTOLIC BLOOD PRESSURE: 124 MMHG | TEMPERATURE: 97.5 F

## 2021-01-12 DIAGNOSIS — Z20.822 EXPOSURE TO 2019 NOVEL CORONAVIRUS: Primary | ICD-10-CM

## 2021-01-12 DIAGNOSIS — Z13.83 SCREENING FOR CARDIOVASCULAR, RESPIRATORY, AND GENITOURINARY DISEASES: ICD-10-CM

## 2021-01-12 DIAGNOSIS — Z13.89 SCREENING FOR CARDIOVASCULAR, RESPIRATORY, AND GENITOURINARY DISEASES: ICD-10-CM

## 2021-01-12 DIAGNOSIS — Z13.6 SCREENING FOR CARDIOVASCULAR, RESPIRATORY, AND GENITOURINARY DISEASES: ICD-10-CM

## 2021-01-12 PROCEDURE — 99213 OFFICE O/P EST LOW 20 MIN: CPT | Performed by: NURSE PRACTITIONER

## 2021-01-12 NOTE — PROGRESS NOTES
Bob Malone is a 66 y.o. female who was seen in clinic today (2021) for an acute visit. Assessment/Plan:            * COVID-19 sample collected and submitted       * Patient given detailed CDC instructions contained within After Visit Summary    Diagnoses and all orders for this visit:    1. Exposure to 2019 novel coronavirus    2. Screening for cardiovascular, respiratory, and genitourinary diseases       known covid exposure. Discussed expected course/resolution/complications of diagnosis in detail with patient. Advised pt on CDC guidance, OTC medications for symptom management, red flags reviewed and should develop to seek emergency medical attn. Reviewed with her that COVID-19 pandemic is an evolving situation with rapidly changing recommendations & guidelines, continue to practice hand hygiene, social distancing, wearing of facial coverings. Regardless of testing results, should still follow CDC guidelines as there is a chance of a false negative, such as a poor sample collection or being too soon to test after exposure. Medical decisions are made based on the the best information available at the time. Recommended she stay tuned for updates published by trusted sources and to advise your PCP of any unexpected changes in clinical condition     Subjective: Jaida Ortega was seen today for covid 19 exposure. Went to a  with covid 19 exposure 8 days ago. H/a and diarrhea which started Saturday and resolved yesterday with tylenol. She denies a recent history/current: loss of smell/taste, cough, fever, wheezing, SOB, and CURIEL. Non user of tob.          Travel Screening      No screening recorded since 21 0000      Travel History   Travel since 20     No documented travel since 20          ROS - Pertinent items are noted in HPI    Patient Active Problem List   Diagnosis Code    Hypercholesteremia E78.00    CVA (cerebral vascular accident) ([facility] [address] I63.9    Asthma J45.909    Bronchitis, mucopurulent recurrent (HCC) J41.1    Vitamin D deficiency E55.9    Arthritis pain M19.90    Cataract, nuclear sclerotic, left eye H25.12    Dermatochalasis of left upper eyelid H02.834    Dermatochalasis of right upper eyelid H02.831    PCO (posterior capsular opacification), right H26.491    Pseudophakia of right eye Z96.1    Incarcerated hernia K46.0     Home Medications    Medication Sig Start Date End Date Taking? Authorizing Provider   omeprazole (PRILOSEC) 20 mg capsule TAKE ONE CAPSULE BY MOUTH EVERY DAY 12/11/20   Bob Castillo MD   triamcinolone acetonide (KENALOG) 0.1 % topical cream APPLY TO ARMS TWICE DAILY AS NEEDED FOR ITCHING 6/20/20   Katelyn Sandy MD   atorvastatin (LIPITOR) 40 mg tablet TAKE ONE TABLET BY MOUTH EVERY DAY *STOP NIACIN* 3/13/20   Lo Rosado MD   fluticasone [facility]) 50 mcg/actuation nasal spray  2/9/19   Provider, Historical   varicella-zoster gE vac,2 of 2 50 mcg susr Shingrix, one injection now and repeat in 4-6 months. To be administered at Pharmacy. Fax confirmation to me at [phone]. 10/31/18   Lo Rosado MD   omega-3 fatty acids-vitamin e (FISH OIL) 1,000 mg cap Take 1 Cap by mouth two (2) times a day. 6/23/14   Lo Rosado MD   aspirin delayed-release 81 mg tablet Take 81 mg by mouth daily. Provider, Historical   CALCIUM CARBONATE (ISAI-600 PO) Take  by mouth. Provider, Historical   Cholecalciferol, Vitamin D3, (VITAMIN D) 2,000 unit Cap Take  by mouth. Provider, Historical   VITAMIN B COMPLEX (VITAMIN B-100 COMPLEX PO) Take  by mouth. Provider, Historical      No Known Allergies       Objective:   Physical Exam  General:  alert, cooperative, no distress   Ears: Normal external ear canals AU   Sinuses: Normal paranasal sinuses without tenderness   Neck: supple, symmetrical, trachea midline. Heart: normal rate, regular rhythm   Lungs: No dyspneic or audible respiratory distress.         Visit Vitals  /78 Pulse 73   Temp 97.5 °F (36.4 °C)   SpO2 97%         Disclaimer:        Medication risks/benefits/costs/interactions/alternatives discussed with patient. She was given an after visit summary which includes diagnoses, current medications, & vitals. She expressed understanding with the diagnosis and plan. Aspects of this note may have been generated using voice recognition software. Despite editing, there may be some syntax errors.        Cong Silva NP

## 2021-01-14 ENCOUNTER — TELEPHONE (OUTPATIENT)
Dept: PRIMARY CARE CLINIC | Age: 76
End: 2021-01-14

## 2021-01-14 LAB — SARS-COV-2, NAA: NOT DETECTED

## 2021-01-26 ENCOUNTER — IMMUNIZATION (OUTPATIENT)
Dept: FAMILY MEDICINE CLINIC | Age: 76
End: 2021-01-26
Payer: MEDICARE

## 2021-01-26 DIAGNOSIS — Z23 ENCOUNTER FOR IMMUNIZATION: Primary | ICD-10-CM

## 2021-01-26 PROCEDURE — 91301 COVID-19, MRNA, LNP-S, PF, 100MCG/0.5ML DOSE(MODERNA): CPT | Performed by: FAMILY MEDICINE

## 2021-02-22 ENCOUNTER — VIRTUAL VISIT (OUTPATIENT)
Dept: FAMILY MEDICINE CLINIC | Age: 76
End: 2021-02-22
Payer: MEDICARE

## 2021-02-22 DIAGNOSIS — K27.9 PUD (PEPTIC ULCER DISEASE): ICD-10-CM

## 2021-02-22 DIAGNOSIS — Z86.73 HISTORY OF CVA (CEREBROVASCULAR ACCIDENT): Primary | Chronic | ICD-10-CM

## 2021-02-22 DIAGNOSIS — E78.00 HYPERCHOLESTEREMIA: Chronic | ICD-10-CM

## 2021-02-22 PROCEDURE — 99442 PR PHYS/QHP TELEPHONE EVALUATION 11-20 MIN: CPT | Performed by: FAMILY MEDICINE

## 2021-02-22 RX ORDER — ATORVASTATIN CALCIUM 40 MG/1
40 TABLET, FILM COATED ORAL DAILY
Qty: 90 TAB | Refills: 1 | Status: SHIPPED | OUTPATIENT
Start: 2021-02-22 | End: 2021-08-16

## 2021-02-22 RX ORDER — OMEPRAZOLE 20 MG/1
20 CAPSULE, DELAYED RELEASE ORAL DAILY
Qty: 90 CAP | Refills: 1 | Status: SHIPPED | OUTPATIENT
Start: 2021-02-22 | End: 2021-09-20 | Stop reason: SDUPTHER

## 2021-02-22 NOTE — PROGRESS NOTES
1. Have you been to the ER, urgent care clinic since your last visit? Hospitalized since your last visit? No    2. Have you seen or consulted any other health care providers outside of the [address] [place] [address] since your last visit? Include any pap smears or colon screening.  No         Health Maintenance Due   Topic Date Due    Hepatitis C Screening  1945    Medicare Yearly Exam  02/13/2020    Pneumococcal 65+ years (2 of 2 - PPSV23) 10/09/2020    COVID-19 Vaccine (2 of 2 - Moderna series) 02/23/2021

## 2021-02-22 NOTE — PROGRESS NOTES
Claudell Rucks is a 66 y.o. female evaluated via Telephone on 21. Patient Identity confirmed by . Consent:  He and/or health care decision maker is aware that that he may receive a bill for this telephone service, depending on his insurance coverage, and has provided verbal consent to proceed: Yes    Physician Location: Office  Patient Location: Home  Nurse Assisting with Encounter: Miriam Sullivan LPN    Chief Complaint   Patient presents with    Follow Up Chronic Condition    Labs      Information gathered from patient and/or health care decision maker. HPI:   Gastroesophageal Reflux/PUD:  Current control of Symptoms: Stable  Primary symptoms: heartburn  Hiatal Hernia: No  Current Medications: Prilosec  The patient has no history melena or bright red blood in the stools. The patient avoids high dose aspirin and NSAID therapy. The patient is aware of diet changes needed, elevating the head of the bed and appropriate use of antacids. Hypertriglyceridemia Follow up:   Cardiovascular risks for her are: hyperlipidemia  CVA. Currently she takes Lipitor (atorvastatin), 40 mg. Myalgias: NO   Fatigue: NO   Other side effects: NO     Wt Readings from Last 3 Encounters:   19 197 lb (89.4 kg)   19 199 lb (90.3 kg)   19 191 lb (86.6 kg)     History of CVA: Has had multiple in the past with elevated Cholesterol, Family History of CVA. No persistent deficits. Does see Neurologist.    Review of Systems   Constitutional: Negative for chills and fever. HENT: Negative for congestion. Cardiovascular: Negative for chest pain and palpitations. Gastrointestinal: Negative for nausea and vomiting. Skin: Negative for rash. Limited Exam:  Due to this being a TeleHealth evaluation, many elements of the physical examination are unable to be assessed.       Constitutional: Appears well-developed and well-nourished in no apparent distress   Mental status: Alert and awake, Oriented to person/place/time, Able to follow commands  Psychiatric: Normal affect, normal judgment/insight. No hallucinations     Current Outpatient Medications on File Prior to Visit   Medication Sig Dispense Refill    triamcinolone acetonide (KENALOG) 0.1 % topical cream APPLY TO ARMS TWICE DAILY AS NEEDED FOR ITCHING 453.6 g 0    fluticasone (FLONASE) 50 mcg/actuation nasal spray       omega-3 fatty acids-vitamin e (FISH OIL) 1,000 mg cap Take 1 Cap by mouth two (2) times a day.  aspirin delayed-release 81 mg tablet Take 81 mg by mouth daily.  CALCIUM CARBONATE (ISAI-600 PO) Take  by mouth.  Cholecalciferol, Vitamin D3, (VITAMIN D) 2,000 unit Cap Take  by mouth.  VITAMIN B COMPLEX (VITAMIN B-100 COMPLEX PO) Take  by mouth.  [DISCONTINUED] omeprazole (PRILOSEC) 20 mg capsule TAKE ONE CAPSULE BY MOUTH EVERY DAY 90 Cap 0    [DISCONTINUED] atorvastatin (LIPITOR) 40 mg tablet TAKE ONE TABLET BY MOUTH EVERY DAY *STOP NIACIN* 90 Tab 1    varicella-zoster gE vac,2 of 2 50 mcg susr Shingrix, one injection now and repeat in 4-6 months. To be administered at Pharmacy. Fax confirmation to me at [phone]. 2 Each 0     No current facility-administered medications on file prior to visit.          No Known Allergies     Patient Active Problem List    Diagnosis Date Noted    History of CVA (cerebrovascular accident) 02/22/2021    PUD (peptic ulcer disease) 02/22/2021    Incarcerated hernia 11/12/2019    Cataract, nuclear sclerotic, left eye 07/10/2017    Dermatochalasis of left upper eyelid 07/10/2017    Dermatochalasis of right upper eyelid 07/10/2017    PCO (posterior capsular opacification), right 07/10/2017    Pseudophakia of right eye 07/10/2017    Arthritis pain 12/09/2014    Vitamin D deficiency 01/07/2014    Bronchitis, mucopurulent recurrent ([address] 05/14/2010    Hypercholesteremia 04/05/2010    CVA (cerebral vascular accident) ([facility] [address] 04/05/2010    Asthma 04/05/2010        Health Maintenance Due   Topic Date Due    Hepatitis C Screening  1945    Medicare Yearly Exam  02/13/2020    Pneumococcal 65+ years (2 of 2 - PPSV23) 10/09/2020    COVID-19 Vaccine (2 of 2 - Jonathan Carbo series) 02/23/2021        Assessment/Plan:  Details of this discussion including any medical advice provided: Refills on medications and labs ordered. Patient is overall doing well. After labs likely follow up in office for [address] ICD-10-CM ICD-9-CM    1. History of CVA (cerebrovascular accident)  Z86.73 V12.54    2. PUD (peptic ulcer disease)  K27.9 533.90 omeprazole (PRILOSEC) 20 mg capsule      METABOLIC PANEL, COMPREHENSIVE      CBC W/O DIFF   3. Hypercholesteremia  E78.00 272.0 atorvastatin (LIPITOR) 40 mg tablet      LIPID PANEL     Follow-up and Dispositions    · Return for TBD . Total Time: minutes: 11-20 minutes was spent on telemedicine encounter discussing above problems and plans. Patient Problem list, medications, and Allergies were reviewed during this encounter. Pursuant to the emergency declaration under the [address] [place] [address], [facility] waiver authority and the [employer] Act, this Telephone Visit was conducted, with patient's consent, to reduce the patient's risk of exposure to COVID-19 and provide continuity of care for an established patient. I affirm this is a Patient Initiated Episode with an Established Patient who has not had a related appointment within my department in the past 7 days or scheduled within the next 24 hours. Discussed diagnoses in detail with patient. Medication risks/benefits/side effects discussed with patient. All of the patient's questions were addressed. The patient understands and agrees with our plan of care. The patient knows to call back if they are unsure of or forget any changes we discussed today or if the symptoms change.     Note: not billable if this call serves to triage the patient into an appointment for the relevant concern    MD MANDA You & MINAD SALAS [facility]  02/22/21

## 2021-02-24 ENCOUNTER — IMMUNIZATION (OUTPATIENT)
Dept: FAMILY MEDICINE CLINIC | Age: 76
End: 2021-02-24
Payer: MEDICARE

## 2021-02-24 DIAGNOSIS — Z23 ENCOUNTER FOR IMMUNIZATION: Primary | ICD-10-CM

## 2021-02-24 PROCEDURE — 0012A COVID-19, MRNA, LNP-S, PF, 100MCG/0.5ML DOSE(MODERNA): CPT | Performed by: FAMILY MEDICINE

## 2021-02-24 PROCEDURE — 91301 COVID-19, MRNA, LNP-S, PF, 100MCG/0.5ML DOSE(MODERNA): CPT | Performed by: FAMILY MEDICINE

## 2021-03-12 ENCOUNTER — VIRTUAL VISIT (OUTPATIENT)
Dept: FAMILY MEDICINE CLINIC | Age: 76
End: 2021-03-12
Payer: MEDICARE

## 2021-03-12 DIAGNOSIS — N30.00 ACUTE CYSTITIS WITHOUT HEMATURIA: Primary | ICD-10-CM

## 2021-03-12 PROCEDURE — 99442 PR PHYS/QHP TELEPHONE EVALUATION 11-20 MIN: CPT | Performed by: STUDENT IN AN ORGANIZED HEALTH CARE EDUCATION/TRAINING PROGRAM

## 2021-03-12 RX ORDER — SULFAMETHOXAZOLE AND TRIMETHOPRIM 800; 160 MG/1; MG/1
1 TABLET ORAL 2 TIMES DAILY
Qty: 6 TAB | Refills: 0 | Status: SHIPPED | OUTPATIENT
Start: 2021-03-12 | End: 2021-03-15

## 2021-03-12 NOTE — PROGRESS NOTES
Evelia Eric (: 1945) is a 66 y.o. female, established patient, here for evaluation of the following chief complaint(s):   UTI       ASSESSMENT/PLAN:  1. Acute cystitis without hematuria  -     trimethoprim-sulfamethoxazole (BACTRIM DS, SEPTRA DS) 160-800 mg per tablet; Take 1 Tab by mouth two (2) times a day for 3 days. , Normal, Disp-6 Tab, R-0      No follow-ups on file. SUBJECTIVE/OBJECTIVE:  UTI- Symptoms over 1 week, treated with azo x2 rounds. Urinary frequency, dysuria and pelvic pressure. Denies itching, fevers, chills, flank pain. Pt has been drinking less due to the annoyance of urinary frequency. Review of Systems   Constitutional: Negative for chills and fatigue. Respiratory: Negative for cough and shortness of breath. Cardiovascular: Negative for chest pain and palpitations. Genitourinary: Positive for dysuria, frequency and urgency. Negative for flank pain and hematuria. Neurological: Negative for dizziness and facial asymmetry. No flowsheet data found. Physical Exam  Vitals reviewed: General: Patient speaking in complete sentences without effort. Normal speech and cooperative. Current Outpatient Medications:     trimethoprim-sulfamethoxazole (BACTRIM DS, SEPTRA DS) 160-800 mg per tablet, Take 1 Tab by mouth two (2) times a day for 3 days. , Disp: 6 Tab, Rfl: 0    omeprazole (PRILOSEC) 20 mg capsule, Take 1 Cap by mouth daily. , Disp: 90 Cap, Rfl: 1    atorvastatin (LIPITOR) 40 mg tablet, Take 1 Tab by mouth daily. , Disp: 90 Tab, Rfl: 1    triamcinolone acetonide (KENALOG) 0.1 % topical cream, APPLY TO ARMS TWICE DAILY AS NEEDED FOR ITCHING, Disp: 453.6 g, Rfl: 0    fluticasone (FLONASE) 50 mcg/actuation nasal spray, , Disp: , Rfl:     varicella-zoster gE vac,2 of 2 50 mcg susr, Shingrix, one injection now and repeat in 4-6 months. To be administered at Pharmacy.  Fax confirmation to me at [phone]., Disp: 2 Each, Rfl: 0    omega-3 fatty acids-vitamin e (FISH OIL) 1,000 mg cap, Take 1 Cap by mouth two (2) times a day., Disp: , Rfl:     aspirin delayed-release 81 mg tablet, Take 81 mg by mouth daily. , Disp: , Rfl:     CALCIUM CARBONATE (ISAI-600 PO), Take  by mouth., Disp: , Rfl:     Cholecalciferol, Vitamin D3, (VITAMIN D) 2,000 unit Cap, Take  by mouth., Disp: , Rfl:     VITAMIN B COMPLEX (VITAMIN B-100 COMPLEX PO), Take  by mouth., Disp: , Rfl:           Allena Brunner, was evaluated through a synchronous (real-time) audio-video encounter. The patient (or guardian if applicable) is aware that this is a billable service. Verbal consent to proceed has been obtained within the past 12 months. The visit was conducted pursuant to the emergency declaration under the [address] [place] [address], [address] [place] [address] authority and the [employer] Act. Patient identification was verified, and a caregiver was present when appropriate. The patient was located in a state where the provider was credentialed to provide care. An electronic signature was used to authenticate this note.   -- Primitivo Gonzalez MD

## 2021-03-18 NOTE — PROGRESS NOTES
[address] Medicine Residency Attending Addendum:  Dr. Clark Green MD,  the patient and I were not physically present during this encounter. The resident and I are concurrently monitoring the patient care through appropriate telecommunication technology. I discussed the findings, assessment and plan with the resident and agree with the resident's findings and plan as documented in the resident's note.       Larisa Lizarraga MD

## 2021-04-02 ENCOUNTER — OFFICE VISIT (OUTPATIENT)
Dept: FAMILY MEDICINE CLINIC | Age: 76
End: 2021-04-02
Payer: MEDICARE

## 2021-04-02 VITALS
TEMPERATURE: 97.4 F | OXYGEN SATURATION: 97 % | BODY MASS INDEX: 38.12 KG/M2 | RESPIRATION RATE: 18 BRPM | SYSTOLIC BLOOD PRESSURE: 139 MMHG | DIASTOLIC BLOOD PRESSURE: 79 MMHG | HEART RATE: 60 BPM | WEIGHT: 237.2 LBS | HEIGHT: 66 IN

## 2021-04-02 DIAGNOSIS — J18.9 COMMUNITY ACQUIRED PNEUMONIA, UNSPECIFIED LATERALITY: Primary | ICD-10-CM

## 2021-04-02 PROCEDURE — G8399 PT W/DXA RESULTS DOCUMENT: HCPCS | Performed by: FAMILY MEDICINE

## 2021-04-02 PROCEDURE — G8417 CALC BMI ABV UP PARAM F/U: HCPCS | Performed by: FAMILY MEDICINE

## 2021-04-02 PROCEDURE — G8536 NO DOC ELDER MAL SCRN: HCPCS | Performed by: FAMILY MEDICINE

## 2021-04-02 PROCEDURE — G8427 DOCREV CUR MEDS BY ELIG CLIN: HCPCS | Performed by: FAMILY MEDICINE

## 2021-04-02 PROCEDURE — G8510 SCR DEP NEG, NO PLAN REQD: HCPCS | Performed by: FAMILY MEDICINE

## 2021-04-02 PROCEDURE — 1090F PRES/ABSN URINE INCON ASSESS: CPT | Performed by: FAMILY MEDICINE

## 2021-04-02 PROCEDURE — 99213 OFFICE O/P EST LOW 20 MIN: CPT | Performed by: FAMILY MEDICINE

## 2021-04-02 PROCEDURE — 1101F PT FALLS ASSESS-DOCD LE1/YR: CPT | Performed by: FAMILY MEDICINE

## 2021-04-02 RX ORDER — BENZONATATE 100 MG/1
CAPSULE ORAL
COMMUNITY
Start: 2021-03-29 | End: 2021-04-02 | Stop reason: SDUPTHER

## 2021-04-02 RX ORDER — BENZONATATE 100 MG/1
100 CAPSULE ORAL
Qty: 30 CAP | Refills: 1 | Status: SHIPPED | OUTPATIENT
Start: 2021-04-02 | End: 2021-09-20

## 2021-04-02 RX ORDER — DOXYCYCLINE 100 MG/1
CAPSULE ORAL
COMMUNITY
Start: 2021-03-29 | End: 2021-09-20

## 2021-04-02 NOTE — PROGRESS NOTES
Chief Complaint   Patient presents with   [facility] Follow Up     [facility]     Visit Vitals  /79 (BP 1 Location: Left arm, BP Patient Position: Sitting, BP Cuff Size: Adult)   Pulse 60   Temp 97.4 °F (36.3 °C) (Temporal)   Resp 18   Ht 5' 6\" (1.676 m)   Wt 237 lb 3.2 oz (107.6 kg)   SpO2 97%   BMI 38.29 kg/m²     1. Have you been to the ER, urgent care clinic since your last visit? Hospitalized since your last visit? Yes [facility]    2. Have you seen or consulted any other health care providers outside of the [address] [place] [address] since your last visit? Include any pap smears or colon screening.  No    Reviewed record in preparation for visit and have necessary documentation  Pt did not bring medication to office visit for review  opportunity was given for questions  Goals that were addressed and/or need to be completed during or after this appointment include   Health Maintenance Due   Topic Date Due    Hepatitis C Screening  Never done    Medicare Yearly Exam  02/13/2020    Pneumococcal 65+ years (2 of 2 - PPSV23) 10/09/2020

## 2021-04-02 NOTE — PROGRESS NOTES
[facility]    History of Present Illness: Boni Pal is a 66 y.o. female with history of CVA, PUD, Asthma, Bronchitis, HLD  CC: Follow up PNA  History provided by patient and Records    HPI:  Pneumonia:  - Patient is a 66 y.o. female presenting for follow up about Pneumonia. - Symptoms: Now having mild cough intermittently and some fatigue. Had severe fatigue with dry cough and SOB/CURIEL with mild activity. Per [facility] records started on Doxycycline. - Symptom Onset: 2 weeks ago, seen at [facility] on Monday, started on Doxycycline and has improved. - Pertinent History: none  - Pulmonary Conditions: Asthma  - History of Hospitalization/Intubation for PNA: No   - Recent sick Contacts: No   - Most recent antibiotic use: Currently on Doxycycline        Health Maintenance  Health Maintenance Due   Topic Date Due    Hepatitis C Screening  Never done    Medicare Yearly Exam  02/13/2020    Pneumococcal 65+ years (2 of 2 - PPSV23) 10/09/2020       Past Medical, Family, and Social History:     Current Outpatient Medications on File Prior to Visit   Medication Sig Dispense Refill    doxycycline (VIBRAMYCIN) 100 mg capsule       omeprazole (PRILOSEC) 20 mg capsule Take 1 Cap by mouth daily. 90 Cap 1    atorvastatin (LIPITOR) 40 mg tablet Take 1 Tab by mouth daily. 90 Tab 1    triamcinolone acetonide (KENALOG) 0.1 % topical cream APPLY TO ARMS TWICE DAILY AS NEEDED FOR ITCHING 453.6 g 0    omega-3 fatty acids-vitamin e (FISH OIL) 1,000 mg cap Take 1 Cap by mouth two (2) times a day.  aspirin delayed-release 81 mg tablet Take 81 mg by mouth daily.  CALCIUM CARBONATE (ISAI-600 PO) Take  by mouth.  Cholecalciferol, Vitamin D3, (VITAMIN D) 2,000 unit Cap Take  by mouth.  VITAMIN B COMPLEX (VITAMIN B-100 COMPLEX PO) Take  by mouth.       [DISCONTINUED] benzonatate (TESSALON) 100 mg capsule       fluticasone (FLONASE) 50 mcg/actuation nasal spray       varicella-zoster gE vac,2 of 2 50 mcg susr Shingrix, one injection now and repeat in 4-6 months. To be administered at Pharmacy. Fax confirmation to me at [phone]. 2 Each 0     No current facility-administered medications on file prior to visit.         Patient Active Problem List   Diagnosis Code    Hypercholesteremia E78.00    CVA (cerebral vascular accident) ([facility] [address] I63.9    Asthma J45.909    Bronchitis, mucopurulent recurrent ([facility] [address] J41.1    Vitamin D deficiency E55.9    Arthritis pain M19.90    Cataract, nuclear sclerotic, left eye H25.12    Dermatochalasis of left upper eyelid H02.834    Dermatochalasis of right upper eyelid H02.831    PCO (posterior capsular opacification), right H26.491    Pseudophakia of right eye Z96.1    Incarcerated hernia K46.0    History of CVA (cerebrovascular accident) Z80.78    PUD (peptic ulcer disease) K27.9       Social History     Socioeconomic History    Marital status:      Spouse name: Not on file    Number of children: Not on file    Years of education: Not on file    Highest education level: Not on file   Occupational History    Not on file   Social Needs    Financial resource strain: Not on file    Food insecurity     Worry: Not on file     Inability: Not on file    Transportation needs     Medical: Not on file     Non-medical: Not on file   Tobacco Use    Smoking status: Never Smoker    Smokeless tobacco: Never Used   Substance and Sexual Activity    Alcohol use: No    Drug use: No    Sexual activity: Not on file   Lifestyle    Physical activity     Days per week: Not on file     Minutes per session: Not on file    Stress: Not on file   Relationships    Social connections     Talks on phone: Not on file     Gets together: Not on file     Attends L[employer] day service: Not on file     Active member of club or organization: Not on file     Attends meetings of clubs or organizations: Not on file     Relationship status: Not on file   Sherl Loge Intimate partner violence     Fear of current or ex partner: Not on file     Emotionally abused: Not on file     Physically abused: Not on file     Forced sexual activity: Not on file   Other Topics Concern    Not on file   Social History Narrative    Not on file       Review of Systems   Review of Systems   Constitutional: Positive for malaise/fatigue. Negative for chills and fever. HENT: Negative for congestion. Respiratory: Positive for cough. Cardiovascular: Negative for chest pain and palpitations. Gastrointestinal: Negative for abdominal pain, nausea and vomiting. Neurological: Negative for dizziness and headaches. Objective:     Visit Vitals  /79 (BP 1 Location: Left arm, BP Patient Position: Sitting, BP Cuff Size: Adult)   Pulse 60   Temp 97.4 °F (36.3 °C) (Temporal)   Resp 18   Ht 5' 6\" (1.676 m)   Wt 237 lb 3.2 oz (107.6 kg)   SpO2 97%   BMI 38.29 kg/m²        Physical Exam  Vitals signs and nursing note reviewed. Constitutional:       Appearance: Normal appearance. HENT:      Head: Normocephalic and atraumatic. Cardiovascular:      Rate and Rhythm: Normal rate and regular rhythm. Pulses: Normal pulses. Heart sounds: Normal heart sounds. No murmur. No friction rub. No gallop. Pulmonary:      Effort: Pulmonary effort is normal.      Breath sounds: Normal breath sounds. Abdominal:      General: Abdomen is flat. Bowel sounds are normal.      Palpations: Abdomen is soft. Neurological:      Mental Status: She is alert. Pertinent Labs/Studies:      Assessment and orders:       ICD-10-CM ICD-9-CM    1. Community acquired pneumonia, unspecified laterality  J18.9 486 doxycycline (VIBRAMYCIN) 100 mg capsule      benzonatate (TESSALON) 100 mg capsule     Diagnoses and all orders for this visit:    1.  Community acquired pneumonia, unspecified laterality: Overall improving, not back to shae yet, but will complete Antibiotic at this time, and refill of Tessalon. Follow-up and Dispositions    · Return if symptoms worsen or fail to improve. I have discussed the diagnosis with the patient and the intended plan as seen in the above orders. Social history, medical history, and labs were reviewed. The patient has received an after-visit summary and questions were answered concerning future plans. I have discussed medication side effects and warnings with the patient as well.     MD MANDA Magaña & MINDA SALAS [facility]  04/02/21

## 2021-09-20 ENCOUNTER — OFFICE VISIT (OUTPATIENT)
Dept: FAMILY MEDICINE CLINIC | Age: 76
End: 2021-09-20
Payer: MEDICARE

## 2021-09-20 VITALS
RESPIRATION RATE: 18 BRPM | DIASTOLIC BLOOD PRESSURE: 69 MMHG | WEIGHT: 197.2 LBS | HEIGHT: 66 IN | BODY MASS INDEX: 31.69 KG/M2 | TEMPERATURE: 97.3 F | SYSTOLIC BLOOD PRESSURE: 132 MMHG | OXYGEN SATURATION: 96 % | HEART RATE: 56 BPM

## 2021-09-20 DIAGNOSIS — Z11.59 ENCOUNTER FOR HEPATITIS C SCREENING TEST FOR LOW RISK PATIENT: ICD-10-CM

## 2021-09-20 DIAGNOSIS — E55.9 VITAMIN D DEFICIENCY: ICD-10-CM

## 2021-09-20 DIAGNOSIS — Z86.73 HISTORY OF CVA (CEREBROVASCULAR ACCIDENT): ICD-10-CM

## 2021-09-20 DIAGNOSIS — K27.9 PUD (PEPTIC ULCER DISEASE): Primary | ICD-10-CM

## 2021-09-20 DIAGNOSIS — J45.20 MILD INTERMITTENT ASTHMA WITHOUT COMPLICATION: ICD-10-CM

## 2021-09-20 DIAGNOSIS — E78.00 HYPERCHOLESTEREMIA: ICD-10-CM

## 2021-09-20 PROCEDURE — 1090F PRES/ABSN URINE INCON ASSESS: CPT | Performed by: FAMILY MEDICINE

## 2021-09-20 PROCEDURE — G8427 DOCREV CUR MEDS BY ELIG CLIN: HCPCS | Performed by: FAMILY MEDICINE

## 2021-09-20 PROCEDURE — 99214 OFFICE O/P EST MOD 30 MIN: CPT | Performed by: FAMILY MEDICINE

## 2021-09-20 PROCEDURE — 1101F PT FALLS ASSESS-DOCD LE1/YR: CPT | Performed by: FAMILY MEDICINE

## 2021-09-20 PROCEDURE — G8536 NO DOC ELDER MAL SCRN: HCPCS | Performed by: FAMILY MEDICINE

## 2021-09-20 PROCEDURE — G8510 SCR DEP NEG, NO PLAN REQD: HCPCS | Performed by: FAMILY MEDICINE

## 2021-09-20 PROCEDURE — G8417 CALC BMI ABV UP PARAM F/U: HCPCS | Performed by: FAMILY MEDICINE

## 2021-09-20 PROCEDURE — G8399 PT W/DXA RESULTS DOCUMENT: HCPCS | Performed by: FAMILY MEDICINE

## 2021-09-20 RX ORDER — OMEPRAZOLE 20 MG/1
20 CAPSULE, DELAYED RELEASE ORAL 2 TIMES DAILY
Qty: 180 CAPSULE | Refills: 1 | Status: SHIPPED | OUTPATIENT
Start: 2021-09-20 | End: 2022-05-05

## 2021-09-20 RX ORDER — ASCORBIC ACID 125 MG
125 TABLET,CHEWABLE ORAL DAILY
COMMUNITY

## 2021-09-20 NOTE — PROGRESS NOTES
[facility]    History of Present Illness: Rosalia Eubanks is a 64 y.o. female with history of CVA, PUD, Asthma, Recurrent Bronchitis, HLD, Vitamin D   CC: Follow up Chronic Conditions  History provided by patient and Records    HPI:  Asthma Follow Up:  Patient is following up for asthma. Patient is not currently in exacerbation.  - Currently Bronchitis symptoms are stable. Chronic Symptoms:chronic dyspnea: severity = mild: course of sx: stable. Chronic Cough: severity: mild: sputum : none. - Oxygen: she currently is not on home oxygen therapy. - Compliance with medications: Good  - Patient does not smoke cigarettes. - Flu shot past 12 months? no    History of CVA: Noting some occasional issues with memory but otherwise no deficits. Gastroesophageal Reflux:  Current control of Symptoms: Occasional worsened  Primary symptoms: heartburn  Hiatal Hernia: No  Current Medications: Prilosec  The patient has no history melena or bright red blood in the stools. The patient avoids high dose aspirin and NSAID therapy. The patient is aware of diet changes needed, elevating the head of the bed and appropriate use of antacids. Hypertriglyceridemia Follow up:   Cardiovascular risks for her are: hypertension  hyperlipidemia. Current Medications:  Key Antihyperlipidemia Meds             atorvastatin (LIPITOR) 40 mg tablet (Taking) TAKE ONE TABLET BY MOUTH EVERY DAY    omega-3 fatty acids-vitamin e (FISH OIL) 1,000 mg cap (Taking) Take 1 Cap by mouth two (2) times a day.           Compliance: Yes   Myalgias: No   Fatigue: NO   Other side effects: NO     Wt Readings from Last 3 Encounters:   09/20/21 197 lb 3.2 oz (89.4 kg)   04/02/21 237 lb 3.2 oz (107.6 kg)   11/12/19 197 lb (89.4 kg)     Lab Results   Component Value Date/Time    Cholesterol, total 145 02/23/2021 08:40 AM    HDL Cholesterol 64 02/23/2021 08:40 AM    LDL, calculated 62.2 02/23/2021 08:40 AM    VLDL, calculated 18.8 02/23/2021 08:40 AM    Triglyceride 94 02/23/2021 08:40 AM    CHOL/HDL Ratio 2.3 02/23/2021 08:40 AM      Lab Results   Component Value Date/Time    ALT (SGPT) 27 02/23/2021 08:40 AM    AST (SGOT) 17 02/23/2021 08:40 AM    Alk. phosphatase 86 02/23/2021 08:40 AM    Bilirubin, direct 0.24 08/18/2017 12:38 PM    Bilirubin, total 1.1 (H) 02/23/2021 08:40 AM           Health Maintenance  Health Maintenance Due   Topic Date Due    Hepatitis C Screening  Never done    Pneumococcal 65+ years (2 of 2 - PPSV23) 10/09/2020    Medicare Yearly Exam  05/06/2021    DTaP/Tdap/Td series (2 - Td or Tdap) 07/13/2021    Flu Vaccine (1) 09/01/2021       Past Medical, Family, and Social History:     Current Outpatient Medications on File Prior to Visit   Medication Sig Dispense Refill    ascorbic acid, vitamin C, (Vitamin C) 125 mg chew Take 125 Tablets by mouth daily.  atorvastatin (LIPITOR) 40 mg tablet TAKE ONE TABLET BY MOUTH EVERY DAY 90 Tablet 1    triamcinolone acetonide (KENALOG) 0.1 % topical cream APPLY TO ARMS TWICE DAILY AS NEEDED FOR ITCHING 453.6 g 0    omega-3 fatty acids-vitamin e (FISH OIL) 1,000 mg cap Take 1 Cap by mouth two (2) times a day.  aspirin delayed-release 81 mg tablet Take 81 mg by mouth daily.  CALCIUM CARBONATE (ISAI-600 PO) Take  by mouth.  Cholecalciferol, Vitamin D3, (VITAMIN D) 2,000 unit Cap Take  by mouth.  VITAMIN B COMPLEX (VITAMIN B-100 COMPLEX PO) Take  by mouth.  [DISCONTINUED] doxycycline (VIBRAMYCIN) 100 mg capsule       [DISCONTINUED] benzonatate (TESSALON) 100 mg capsule Take 1 Cap by mouth three (3) times daily as needed for Cough. (Patient not taking: Reported on 9/20/2021) 30 Cap 1    [DISCONTINUED] omeprazole (PRILOSEC) 20 mg capsule Take 1 Cap by mouth daily.  90 Cap 1    [DISCONTINUED] fluticasone (FLONASE) 50 mcg/actuation nasal spray  (Patient not taking: Reported on 9/20/2021)      [DISCONTINUED] varicella-zoster gE vac,2 of 2 50 mcg susr Shingrix, one injection now and repeat in 4-6 months. To be administered at Pharmacy. Fax confirmation to me at [phone]. 2 Each 0     No current facility-administered medications on file prior to visit. Patient Active Problem List   Diagnosis Code    Hypercholesteremia E78.00    Asthma J45.909    Vitamin D deficiency E55.9    Arthritis pain M19.90    Cataract, nuclear sclerotic, left eye H25.12    Dermatochalasis of left upper eyelid H02.834    Dermatochalasis of right upper eyelid H02.831    PCO (posterior capsular opacification), right H26.491    Pseudophakia of right eye Z96.1    Incarcerated hernia K46.0    Hx of stroke without residual deficits Z86.73    PUD (peptic ulcer disease) K27.9       Social History     Socioeconomic History    Marital status:      Spouse name: Not on file    Number of children: Not on file    Years of education: Not on file    Highest education level: Not on file   Occupational History    Not on file   Tobacco Use    Smoking status: Never Smoker    Smokeless tobacco: Never Used   Substance and Sexual Activity    Alcohol use: No    Drug use: No    Sexual activity: Not on file   Other Topics Concern    Not on file   Social History Narrative    Not on file     Social Determinants of Health     Financial Resource Strain:     Difficulty of Paying Living Expenses:    Food Insecurity:     Worried About Running Out of Food in the Last Year:     Ran Out of Food in the Last Year:    Transportation Needs:     Lack of Transportation (Medical):      Lack of Transportation (Non-Medical):    Physical Activity:     Days of Exercise per Week:     Minutes of Exercise per Session:    Stress:     Feeling of Stress :    Social Connections:     Frequency of Communication with Friends and Family:     Frequency of Social Gatherings with Friends and Family:     Attends Rastafari Services:     Active Member of Clubs or Organizations:     Attends Club or Organization Meetings:     Marital Status:    Intimate Partner Violence:     Fear of Current or Ex-Partner:     Emotionally Abused:     Physically Abused:     Sexually Abused:        Review of Systems   Review of Systems   Constitutional: Negative for chills and fever. HENT: Negative for congestion. Respiratory: Negative for cough. Cardiovascular: Negative for chest pain and palpitations. Gastrointestinal: Negative for nausea and vomiting. Musculoskeletal: Negative for myalgias. Neurological: Negative for dizziness, tingling and headaches. Objective:     Visit Vitals  /69 (BP 1 Location: Left upper arm, BP Patient Position: Sitting, BP Cuff Size: Large adult)   Pulse (!) 56   Temp 97.3 °F (36.3 °C) (Temporal)   Resp 18   Ht 5' 6\" (1.676 m)   Wt 197 lb 3.2 oz (89.4 kg)   SpO2 96%   BMI 31.83 kg/m²        Physical Exam  Vitals and nursing note reviewed. Constitutional:       Appearance: Normal appearance. HENT:      Head: Normocephalic and atraumatic. Cardiovascular:      Rate and Rhythm: Normal rate and regular rhythm. Pulses: Normal pulses. Heart sounds: Normal heart sounds. Pulmonary:      Effort: Pulmonary effort is normal.      Breath sounds: Normal breath sounds. Abdominal:      General: Abdomen is flat. Bowel sounds are normal.      Palpations: Abdomen is soft. Musculoskeletal:         General: Normal range of motion. Cervical back: Normal range of motion and neck supple. Skin:     General: Skin is warm and dry. Neurological:      General: No focal deficit present. Mental Status: She is alert and oriented to person, place, and time. Pertinent Labs/Studies:      Assessment and orders:       ICD-10-CM ICD-9-CM    1. PUD (peptic ulcer disease)  K27.9 533.90 omeprazole (PRILOSEC) 20 mg capsule   2. Mild intermittent asthma without complication  T45.02 451.52    3.  Hypercholesteremia  E78.00 272.0 CBC W/O DIFF      METABOLIC PANEL, COMPREHENSIVE LIPID PANEL   4. Vitamin D deficiency  E55.9 268.9 VITAMIN D, 25 HYDROXY   5. History of CVA (cerebrovascular accident)  Z86.73 V12.54    6. Encounter for hepatitis C screening test for low risk patient  Z11.59 V73.89 HEPATITIS C AB     Diagnoses and all orders for this visit:    1. PUD (peptic ulcer disease): Increasing to BID dosing.  -     omeprazole (PRILOSEC) 20 mg capsule; Take 1 Capsule by mouth two (2) times a day. 2. Mild intermittent asthma without complication    3. Hypercholesteremia  -     CBC W/O DIFF; Future  -     METABOLIC PANEL, COMPREHENSIVE; Future  -     LIPID PANEL; Future    4. Vitamin D deficiency  -     VITAMIN D, 25 HYDROXY; Future    5. History of CVA (cerebrovascular accident)    6. Encounter for hepatitis C screening test for low risk patient  -     HEPATITIS C AB; Future      Follow-up and Dispositions    · Return in about 4 weeks (around 10/18/2021) for MWE and follow up. I have discussed the diagnosis with the patient and the intended plan as seen in the above orders. Social history, medical history, and labs were reviewed. The patient has received an after-visit summary and questions were answered concerning future plans. I have discussed medication side effects and warnings with the patient as well.     MD MANDA Cho & MINDA SALAS [facility]  09/20/21

## 2021-09-20 NOTE — PROGRESS NOTES
1. Have you been to the ER, urgent care clinic since your last visit? Hospitalized since your last visit? Yes, Patient Bjorn 9-6-21, sinus Covid-19 test-positive. 2. Have you seen or consulted any other health care providers outside of the [address] [place] [address] since your last visit? Include any pap smears or colon screening. No    Reviewed record in preparation for visit and have necessary documentation  Goals that were addressed and/or need to be completed during or after this appointment include     Health Maintenance Due   Topic Date Due    Hepatitis C Screening  Never done    Pneumococcal 65+ years (2 of 2 - PPSV23) 10/09/2020    Medicare Yearly Exam  05/06/2021    DTaP/Tdap/Td series (2 - Td or Tdap) 07/13/2021    Flu Vaccine (1) 09/01/2021       Patient is accompanied by self I have received verbal consent from Abimbola Montoya to discuss any/all medical information while they are present in the room.

## 2021-09-21 LAB
25(OH)D3 SERPL-MCNC: 43.5 NG/ML (ref 30–100)
ALBUMIN SERPL-MCNC: 3.7 G/DL (ref 3.5–5)
ALBUMIN/GLOB SERPL: 1.2 {RATIO} (ref 1.1–2.2)
ALP SERPL-CCNC: 85 U/L (ref 45–117)
ALT SERPL-CCNC: 25 U/L (ref 12–78)
ANION GAP SERPL CALC-SCNC: 4 MMOL/L (ref 5–15)
AST SERPL-CCNC: 19 U/L (ref 15–37)
BILIRUB SERPL-MCNC: 0.9 MG/DL (ref 0.2–1)
BUN SERPL-MCNC: 14 MG/DL (ref 6–20)
BUN/CREAT SERPL: 16 (ref 12–20)
CALCIUM SERPL-MCNC: 9.7 MG/DL (ref 8.5–10.1)
CHLORIDE SERPL-SCNC: 102 MMOL/L (ref 97–108)
CHOLEST SERPL-MCNC: 155 MG/DL
CO2 SERPL-SCNC: 29 MMOL/L (ref 21–32)
CREAT SERPL-MCNC: 0.85 MG/DL (ref 0.55–1.02)
ERYTHROCYTE [DISTWIDTH] IN BLOOD BY AUTOMATED COUNT: 13.6 % (ref 11.5–14.5)
GLOBULIN SER CALC-MCNC: 3.2 G/DL (ref 2–4)
GLUCOSE SERPL-MCNC: 94 MG/DL (ref 65–100)
HCT VFR BLD AUTO: 40.4 % (ref 35–47)
HCV AB SERPL QL IA: NONREACTIVE
HDLC SERPL-MCNC: 51 MG/DL
HDLC SERPL: 3 {RATIO} (ref 0–5)
HGB BLD-MCNC: 12.9 G/DL (ref 11.5–16)
LDLC SERPL CALC-MCNC: 85.6 MG/DL (ref 0–100)
MCH RBC QN AUTO: 30.4 PG (ref 26–34)
MCHC RBC AUTO-ENTMCNC: 31.9 G/DL (ref 30–36.5)
MCV RBC AUTO: 95.1 FL (ref 80–99)
NRBC # BLD: 0 K/UL (ref 0–0.01)
NRBC BLD-RTO: 0 PER 100 WBC
PLATELET # BLD AUTO: 277 K/UL (ref 150–400)
PMV BLD AUTO: 10.4 FL (ref 8.9–12.9)
POTASSIUM SERPL-SCNC: 4.6 MMOL/L (ref 3.5–5.1)
PROT SERPL-MCNC: 6.9 G/DL (ref 6.4–8.2)
RBC # BLD AUTO: 4.25 M/UL (ref 3.8–5.2)
SODIUM SERPL-SCNC: 135 MMOL/L (ref 136–145)
TRIGL SERPL-MCNC: 92 MG/DL (ref ?–150)
VLDLC SERPL CALC-MCNC: 18.4 MG/DL
WBC # BLD AUTO: 6.3 K/UL (ref 3.6–11)

## 2021-10-18 ENCOUNTER — OFFICE VISIT (OUTPATIENT)
Dept: FAMILY MEDICINE CLINIC | Age: 76
End: 2021-10-18
Payer: MEDICARE

## 2021-10-18 VITALS
SYSTOLIC BLOOD PRESSURE: 125 MMHG | DIASTOLIC BLOOD PRESSURE: 66 MMHG | BODY MASS INDEX: 31.82 KG/M2 | HEIGHT: 66 IN | RESPIRATION RATE: 18 BRPM | TEMPERATURE: 97.6 F | HEART RATE: 53 BPM | OXYGEN SATURATION: 97 % | WEIGHT: 198 LBS

## 2021-10-18 DIAGNOSIS — Z00.00 MEDICARE ANNUAL WELLNESS VISIT, SUBSEQUENT: Primary | ICD-10-CM

## 2021-10-18 DIAGNOSIS — J45.20 MILD INTERMITTENT ASTHMA WITHOUT COMPLICATION: ICD-10-CM

## 2021-10-18 DIAGNOSIS — Z23 ENCOUNTER FOR IMMUNIZATION: ICD-10-CM

## 2021-10-18 PROCEDURE — G8536 NO DOC ELDER MAL SCRN: HCPCS | Performed by: FAMILY MEDICINE

## 2021-10-18 PROCEDURE — G0008 ADMIN INFLUENZA VIRUS VAC: HCPCS | Performed by: FAMILY MEDICINE

## 2021-10-18 PROCEDURE — 90694 VACC AIIV4 NO PRSRV 0.5ML IM: CPT | Performed by: FAMILY MEDICINE

## 2021-10-18 PROCEDURE — G8417 CALC BMI ABV UP PARAM F/U: HCPCS | Performed by: FAMILY MEDICINE

## 2021-10-18 PROCEDURE — G0439 PPPS, SUBSEQ VISIT: HCPCS | Performed by: FAMILY MEDICINE

## 2021-10-18 PROCEDURE — 1101F PT FALLS ASSESS-DOCD LE1/YR: CPT | Performed by: FAMILY MEDICINE

## 2021-10-18 PROCEDURE — G8427 DOCREV CUR MEDS BY ELIG CLIN: HCPCS | Performed by: FAMILY MEDICINE

## 2021-10-18 PROCEDURE — G8510 SCR DEP NEG, NO PLAN REQD: HCPCS | Performed by: FAMILY MEDICINE

## 2021-10-18 PROCEDURE — G8399 PT W/DXA RESULTS DOCUMENT: HCPCS | Performed by: FAMILY MEDICINE

## 2021-10-18 RX ORDER — ALBUTEROL SULFATE 90 UG/1
2 AEROSOL, METERED RESPIRATORY (INHALATION)
Qty: 1 EACH | Refills: 1 | Status: SHIPPED | OUTPATIENT
Start: 2021-10-18

## 2021-10-18 NOTE — PROGRESS NOTES
1. Have you been to the ER, urgent care clinic since your last visit? Hospitalized since your last visit? Patient !st for asthma flare up    2. Have you seen or consulted any other health care providers outside of the [address] [place] [address] since your last visit? Including any pap smears or colon screening.   no    Reviewed record in preparation for visit and have necessary documentation    Pt did not bring medication to office visit for review    Opportunity was given for questions    Goals that were addressed and/or need to be completed during or after this appointment include   Health Maintenance Due   Topic Date Due    Pneumococcal 65+ years (2 of 2 - PPSV23) 10/09/2020    Medicare Yearly Exam  05/06/2021    DTaP/Tdap/Td series (2 - Td or Tdap) 07/13/2021    Flu Vaccine (1) 09/01/2021

## 2021-10-18 NOTE — PATIENT INSTRUCTIONS
Medicare Wellness Visit, Female     The best way to live healthy is to have a lifestyle where you eat a well-balanced diet, exercise regularly, limit alcohol use, and quit all forms of tobacco/nicotine, if applicable. Regular preventive services are another way to keep healthy. Preventive services (vaccines, screening tests, monitoring & exams) can help personalize your care plan, which helps you manage your own care. Screening tests can find health problems at the earliest stages, when they are easiest to treat. [facility] follows the current, evidence-based guidelines published by the [facility] ([facility]) when recommending preventive services for our patients. Because we follow these guidelines, sometimes recommendations change over time as research supports it. (For example, mammograms used to be recommended annually. Even though Medicare will still pay for an annual mammogram, the newer guidelines recommend a mammogram every two years for women of average risk). Of course, you and your doctor may decide to screen more often for some diseases, based on your risk and your co-morbidities (chronic disease you are already diagnosed with). Preventive services for you include:  - Medicare offers their members a free annual wellness visit, which is time for you and your primary care provider to discuss and plan for your preventive service needs. Take advantage of this benefit every year!  -All adults over the age of 72 should receive the recommended pneumonia vaccines. Current USPSTF guidelines recommend a series of two vaccines for the best pneumonia protection.   -All adults should have a flu vaccine yearly and a tetanus vaccine every 10 years.   -All adults age 48 and older should receive the shingles vaccines (series of two vaccines).       -All adults age 38-68 who are overweight should have a diabetes screening test once every three years.   -All adults born between [birth year] and 1967 should be screened once for Hepatitis C.  -Other screening tests and preventive services for persons with diabetes include: an eye exam to screen for diabetic retinopathy, a kidney function test, a foot exam, and stricter control over your cholesterol.   -Cardiovascular screening for adults with routine risk involves an electrocardiogram (ECG) at intervals determined by your doctor.   -Colorectal cancer screenings should be done for adults age 54-65 with no increased risk factors for colorectal cancer. There are a number of acceptable methods of screening for this type of cancer. Each test has its own benefits and drawbacks. Discuss with your doctor what is most appropriate for you during your annual wellness visit. The different tests include: colonoscopy (considered the best screening method), a fecal occult blood test, a fecal DNA test, and sigmoidoscopy.    -A bone mass density test is recommended when a woman turns 65 to screen for osteoporosis. This test is only recommended one time, as a screening. Some providers will use this same test as a disease monitoring tool if you already have osteoporosis. -Breast cancer screenings are recommended every other year for women of normal risk, age 54-69.  -Cervical cancer screenings for women over age 72 are only recommended with certain risk factors.      Here is a list of your current Health Maintenance items (your personalized list of preventive services) with a due date:  Health Maintenance Due   Topic Date Due    Pneumococcal Vaccine (2 of 2 - PPSV23) 10/09/2020    DTaP/Tdap/Td  (2 - Td or Tdap) 07/13/2021    Yearly Flu Vaccine (1) 09/01/2021

## 2021-10-18 NOTE — PROGRESS NOTES
This is the Subsequent Medicare Annual Wellness Exam, performed 12 months or more after the Initial AWV or the last Subsequent AWV    I have reviewed the patient's medical history in detail and updated the computerized patient record. History     Well Woman exam:  Mariposa Cotto is a 64 y.o. female presenting for well woman exam.     How would you rate your health in generally over the last year? Good  Has your physical and emotional health limited your social activities with family or friends? no    Current Complaints? Asthma exacerbation recently (See attached Problem visit note if applicable)    Menstrual/Sexual History:  Age at which menses began: 13 y.o. Vaginal Bleeding: None    PAP History: Normal Paps    [id number]  Sexually active: No    Risk factors for breast cancer: Low risk    Diet/Exercise History:  Diet: Favorite food Seafood. - Does patient eat at least 5 servings of Fruits/vegetables daily? Yes   - Is patient currently dieting? Yes    Exercise: Patient does not have structured exercise  - Does patient get 150 minutes of structured exercise weekly? No  - Type of work patient has? retired  - Limitations to exercise? None    Healthcare maintenance:   Health Maintenance Due   Topic Date Due    Pneumococcal 65+ years (2 of 2 - PPSV23) 10/09/2020    DTaP/Tdap/Td series (2 - Td or Tdap) 07/13/2021    Flu Vaccine (1) 09/01/2021     Mammogram indicated? No   Colonoscopy indicated? No   DEXA scan indicated? No   HIV/STI testing indicated? No   Hepatitis C testing indicated? No   Lung cancer screening indicated? No   AAA screening indicated?   No     Immunization History   Administered Date(s) Administered    (RETIRED) Pneumococcal Vaccine (Unspecified Type) 02/21/2011    Covid-19, MODERNA, Mrna, Lnp-s, Pf, 100mcg/0.5mL 01/26/2021, 02/24/2021    H1N1 Influenza Virus Vaccine 11/25/2009    Influenza High Dose Vaccine PF 09/11/2017    Influenza Vaccine 10/03/2013, 10/13/2015    Influenza Vaccine [employer] (FIG)) PF (>6 Mo Flulaval, Fluarix, and >3 Yrs Afluria, Fluzone [id number]) 11/20/2014, 12/13/2016    Influenza Vaccine (Tri) Adjuvanted (>65 Yrs FLUAD TRI [id number]) 10/31/2018, 09/23/2019    Influenza Vaccine Split 12/15/2011, 10/08/2012    Influenza Vaccine Whole 10/08/2009, 11/03/2010    Influenza, Quadrivalent, Adjuvanted (>65 Yrs FLUAD QUAD [id number]) 09/29/2020    Pneumococcal Conjugate (PCV-13) 11/10/2015, 10/09/2019    Pneumococcal Vaccine (Pcv) 10/01/2006    TDAP Vaccine 07/13/2011    Zoster Recombinant 11/24/2018, 07/19/2019    Zoster Vaccine, Live 01/21/2013     Flu indicated? Yes  Tdap indicated? Yes  Pneumovax indicated? Yes  Zostervax indicated? No   Meningococcal indicated? No      Past Medical History:   Diagnosis Date    Asthma 4/5/2010    Bronchitis, mucopurulent recurrent ([facility] [address] 5/14/2010    CVA (cerebral vascular accident) ([facility] [address] 4/5/2010    Hypercholesteremia 4/5/2010      Past Surgical History:   Procedure Laterality Date    HX HYSTERECTOMY      HX TONSILLECTOMY       No Known Allergies  Family History   Problem Relation Age of Onset    Stroke Mother     Heart Disease Father      Social History     Tobacco Use    Smoking status: Never Smoker    Smokeless tobacco: Never Used   Substance Use Topics    Alcohol use: No     Depression Risk Factor Screening:     3 most recent PHQ Screens 10/18/2021   Little interest or pleasure in doing things Not at all   Feeling down, depressed, irritable, or hopeless Not at all   Total Score PHQ 2 0     Alcohol Risk Factor Screening:    Do you average more than 1 drink per night or more than 7 drinks a week: No    In the past three months have you have had more than 4 drinks containing alcohol on one occasion: No  Functional Ability and Level of Safety:   Hearing Loss  The patient wears hearing aids. Activities of Daily Living  The home contains: no safety equipment.   Patient does total self care  ADL Assessment 10/18/2021   Feeding yourself No Help Needed   Getting from bed to chair No Help Needed   Getting dressed No Help Needed   Bathing or showering No Help Needed   Walk across the room (includes cane/walker) No Help Needed   Using the telphone No Help Needed   Taking your medications No Help Needed   Preparing meals No Help Needed   Managing money (expenses/bills) No Help Needed   Moderately strenuous housework (laundry) No Help Needed   Shopping for personal items (toiletries/medicines) No Help Needed   Shopping for groceries No Help Needed   Driving No Help Needed   Climbing a flight of stairs No Help Needed   Getting to places beyond walking distances No Help Needed       Ambulation: with no difficulty    Fall Risk  Fall Risk Assessment, last 12 mths 10/18/2021   Able to walk? Yes   Fall in past 12 months? 0   Do you feel unsteady? -   Are you worried about falling -       Abuse Screen  Abuse Screening Questionnaire 10/18/2021   Do you ever feel afraid of your partner? N   Are you in a relationship with someone who physically or mentally threatens you? N   Is it safe for you to go home?  Y     Cognitive Screening   Evaluation of Cognitive Function:  Has your family/caregiver stated any concerns about your memory: no  Normal  Mini Mental State Exam 2/1/2017   What is the Year 1   What is the Season 1   What is the Date 1   What is the Day 1   What is the Month 1   Where are we State 1   Where are we Country 1   Where are we Citizen of the [place] or [place] 1   Where are we Floor 1   Name three objects, then ask the patient to say them 1   Serial sevens Subtract 7 from 100 in increments 1   Ask for the three objects repeated above 3   Name a pencil 1   Name a watch 1   Have the patient repeat this phrase \"No ifs, ands, or buts\" 1   Three stage command: Take the paper in your right hand 1   Fold the paper in half 1   Put the paper on the floor 1   Read and obey the following: CLOSE YOUR EYES 1   Have the patient write a sentence 1   Have the patient copy a figure 1   Mini Mental Score 24     Patient Care Team   Patient Care Team:  Katina Cooper MD as PCP - General (Family Medicine)  Katina Cooper MD as PCP - [facility]  Jacob Fernandes MD (Ophthalmology)  Malini Riggins MD (Otolaryngology)  Assessment/Plan   Education and counseling provided:  Are appropriate based on today's review and evaluation    Diagnoses and all orders for this visit:    1. Medicare annual wellness visit, subsequent    2. Mild intermittent asthma without complication  -     albuterol (PROVENTIL HFA, VENTOLIN HFA, PROAIR HFA) 90 mcg/actuation inhaler; Take 2 Puffs by inhalation every six (6) hours as needed for Wheezing.     3. Encounter for immunization  -     FLU (FLUAD QUAD INFLUENZA VACCINE,QUAD,ADJUVANTED)  -     [address] [place] [address] Maintenance Topics with due status: Overdue       Topic Date Due    Pneumococcal 65+ years 10/09/2020    DTaP/Tdap/Td series 07/13/2021    Flu Vaccine 09/01/2021     Health Maintenance Topics with due status: Not Due       Topic Last Completion Date    Bone Densitometry 11/08/2018    Lipid Screen 09/20/2021    Medicare Yearly Exam 10/18/2021     Health Maintenance Topics with due status: Completed       Topic Last Completion Date    Bone Densitometry (Dexa) Screening 11/08/2018    Shingrix Vaccine Age 50> 07/19/2019    COVID-19 Vaccine 02/24/2021    Hepatitis C Screening 09/20/2021

## 2021-12-01 ENCOUNTER — TELEPHONE (OUTPATIENT)
Dept: FAMILY MEDICINE CLINIC | Age: 76
End: 2021-12-01

## 2021-12-01 NOTE — TELEPHONE ENCOUNTER
Called pt to schedule an appt. . She states that this has been going on for a couple days now and she knows that it's a sinus infection. The next available appt isn't until 12/6 and she states that she can not wait that long. She would like to know if Dr. Karlee Avila would send something to the pharmacy or someone will fit her in to be seen sooner.

## 2021-12-01 NOTE — TELEPHONE ENCOUNTER
----- Message from Vince Sharp sent at 12/1/2021 12:37 PM EST -----  Subject: Message to Provider    QUESTIONS  Information for Provider? Pt has a sinus infection and would like an rx   called in if possible. Mucus is yellow, no chest congestion.   ---------------------------------------------------------------------------  --------------  CALL BACK INFO  What is the best way for the office to contact you? OK to leave message on   voicemail  Preferred Call Back Phone Number? [phone]  ---------------------------------------------------------------------------  --------------  SCRIPT ANSWERS  Relationship to Patient?  Self

## 2021-12-02 ENCOUNTER — OFFICE VISIT (OUTPATIENT)
Dept: FAMILY MEDICINE CLINIC | Age: 76
End: 2021-12-02
Payer: MEDICARE

## 2021-12-02 ENCOUNTER — TRANSCRIBE ORDER (OUTPATIENT)
Dept: SCHEDULING | Age: 76
End: 2021-12-02

## 2021-12-02 VITALS
DIASTOLIC BLOOD PRESSURE: 77 MMHG | WEIGHT: 196.6 LBS | HEART RATE: 66 BPM | HEIGHT: 66 IN | RESPIRATION RATE: 14 BRPM | OXYGEN SATURATION: 98 % | TEMPERATURE: 98.2 F | SYSTOLIC BLOOD PRESSURE: 131 MMHG | BODY MASS INDEX: 31.6 KG/M2

## 2021-12-02 DIAGNOSIS — J01.90 ACUTE BACTERIAL SINUSITIS: Primary | ICD-10-CM

## 2021-12-02 DIAGNOSIS — J45.20 MILD INTERMITTENT ASTHMA WITHOUT COMPLICATION: ICD-10-CM

## 2021-12-02 DIAGNOSIS — Z12.31 SCREENING MAMMOGRAM FOR HIGH-RISK PATIENT: Primary | ICD-10-CM

## 2021-12-02 DIAGNOSIS — B96.89 ACUTE BACTERIAL SINUSITIS: Primary | ICD-10-CM

## 2021-12-02 DIAGNOSIS — E78.00 HYPERCHOLESTEREMIA: Chronic | ICD-10-CM

## 2021-12-02 PROCEDURE — 1090F PRES/ABSN URINE INCON ASSESS: CPT | Performed by: FAMILY MEDICINE

## 2021-12-02 PROCEDURE — G8417 CALC BMI ABV UP PARAM F/U: HCPCS | Performed by: FAMILY MEDICINE

## 2021-12-02 PROCEDURE — G8399 PT W/DXA RESULTS DOCUMENT: HCPCS | Performed by: FAMILY MEDICINE

## 2021-12-02 PROCEDURE — 1101F PT FALLS ASSESS-DOCD LE1/YR: CPT | Performed by: FAMILY MEDICINE

## 2021-12-02 PROCEDURE — 99214 OFFICE O/P EST MOD 30 MIN: CPT | Performed by: FAMILY MEDICINE

## 2021-12-02 PROCEDURE — G8536 NO DOC ELDER MAL SCRN: HCPCS | Performed by: FAMILY MEDICINE

## 2021-12-02 PROCEDURE — G8427 DOCREV CUR MEDS BY ELIG CLIN: HCPCS | Performed by: FAMILY MEDICINE

## 2021-12-02 PROCEDURE — G8510 SCR DEP NEG, NO PLAN REQD: HCPCS | Performed by: FAMILY MEDICINE

## 2021-12-02 RX ORDER — AMOXICILLIN AND CLAVULANATE POTASSIUM 875; 125 MG/1; MG/1
1 TABLET, FILM COATED ORAL EVERY 12 HOURS
Qty: 14 TABLET | Refills: 0 | Status: SHIPPED | OUTPATIENT
Start: 2021-12-02 | End: 2021-12-09

## 2021-12-02 RX ORDER — ATORVASTATIN CALCIUM 40 MG/1
40 TABLET, FILM COATED ORAL DAILY
Qty: 90 TABLET | Refills: 1 | Status: SHIPPED | OUTPATIENT
Start: 2021-12-02 | End: 2022-09-26

## 2021-12-02 NOTE — PROGRESS NOTES
[facility]    Subjective: Benjamín Sierra is a 64 y.o. female with history of Asthma, PUD, HLD,   CC: Sinus Congestion  History provided by Records    HPI:  Sinusitis: Patient presents with sinusitis symptoms over the last 4 days. Patient reports symptoms including nasal congestion, purulent rhinorrhea, cough, sore throats, frequent clearing of the throat and denies sneezing, fevers, foul breath, headaches, itchy eyes, itchy nose. Patient describes pain of the bilateral sinus(es). Patient does not report Viral URI prior to developing these symptoms. Previous OTC treatments include Mucinex which have been minimally effective in treating symptoms. she denies recent sick contacts. she does not have history of recurrent sinusitis. - Last antibiotic use: Not recent     Asthma: Not in exacerbation    Health Maintenance  Health Maintenance Due   Topic Date Due    Pneumococcal 65+ years (2 of 2 - PPSV23) 10/09/2020    DTaP/Tdap/Td series (2 - Td or Tdap) 07/13/2021    COVID-19 Vaccine (3 - Booster for Herb Sax series) 08/24/2021    Bone Densitometry  11/08/2021       Past Medical, Family, and Social History:     Current Outpatient Medications on File Prior to Visit   Medication Sig Dispense Refill    ascorbic acid, vitamin C, (Vitamin C) 125 mg chew Take 125 Tablets by mouth daily.  omeprazole (PRILOSEC) 20 mg capsule Take 1 Capsule by mouth two (2) times a day. 180 Capsule 1    atorvastatin (LIPITOR) 40 mg tablet TAKE ONE TABLET BY MOUTH EVERY DAY 90 Tablet 1    triamcinolone acetonide (KENALOG) 0.1 % topical cream APPLY TO ARMS TWICE DAILY AS NEEDED FOR ITCHING 453.6 g 0    omega-3 fatty acids-vitamin e (FISH OIL) 1,000 mg cap Take 1 Cap by mouth two (2) times a day.  aspirin delayed-release 81 mg tablet Take 81 mg by mouth daily.  CALCIUM CARBONATE (ISAI-600 PO) Take  by mouth.  Cholecalciferol, Vitamin D3, (VITAMIN D) 2,000 unit Cap Take  by mouth.       VITAMIN B COMPLEX (VITAMIN B-100 COMPLEX PO) Take  by mouth.  albuterol (PROVENTIL HFA, VENTOLIN HFA, PROAIR HFA) 90 mcg/actuation inhaler Take 2 Puffs by inhalation every six (6) hours as needed for Wheezing. (Patient not taking: Reported on 12/2/2021) 1 Each 1     No current facility-administered medications on file prior to visit. Patient Active Problem List   Diagnosis Code    Hypercholesteremia E78.00    Asthma J45.909    Vitamin D deficiency E55.9    Arthritis pain M19.90    Cataract, nuclear sclerotic, left eye H25.12    Dermatochalasis of left upper eyelid H02.834    Dermatochalasis of right upper eyelid H02.831    PCO (posterior capsular opacification), right H26.491    Pseudophakia of right eye Z96.1    Incarcerated hernia K46.0    Hx of stroke without residual deficits Z86.73    PUD (peptic ulcer disease) K27.9       Social History     Socioeconomic History    Marital status:      Spouse name: Not on file    Number of children: Not on file    Years of education: Not on file    Highest education level: Not on file   Occupational History    Not on file   Tobacco Use    Smoking status: Never Smoker    Smokeless tobacco: Never Used   Substance and Sexual Activity    Alcohol use: No    Drug use: No    Sexual activity: Not on file   Other Topics Concern    Not on file   Social History Narrative    Not on file     Social Determinants of Health     Financial Resource Strain:     Difficulty of Paying Living Expenses: Not on file   Food Insecurity:     Worried About Running Out of Food in the Last Year: Not on file    Fabricio of Food in the Last Year: Not on file   Transportation Needs:     Lack of Transportation (Medical): Not on file    Lack of Transportation (Non-Medical):  Not on file   Physical Activity:     Days of Exercise per Week: Not on file    Minutes of Exercise per Session: Not on file   Stress:     Feeling of Stress : Not on file   Social Connections:     Frequency of Communication with Friends and Family: Not on file    Frequency of Social Gatherings with Friends and Family: Not on file    Attends Orthodox Services: Not on file    Active Member of Clubs or Organizations: Not on file    Attends Club or Organization Meetings: Not on file    Marital Status: Not on file   Intimate Partner Violence:     Fear of Current or Ex-Partner: Not on file    Emotionally Abused: Not on file    Physically Abused: Not on file    Sexually Abused: Not on file   Housing Stability:     Unable to Pay for Housing in the Last Year: Not on file    Number of [place] in the Last Year: Not on file    Unstable Housing in the Last Year: Not on file       Review of Systems   Constitutional: Negative for chills and fever. HENT: Negative for ear discharge and ear pain. Cardiovascular: Negative for chest pain and palpitations. Gastrointestinal: Negative for nausea and vomiting. Objective:     Visit Vitals  /77 (BP 1 Location: Right upper arm, BP Patient Position: Sitting, BP Cuff Size: Large adult)   Pulse 66   Temp 98.2 °F (36.8 °C) (Oral)   Resp 14   Ht 5' 6\" (1.676 m)   Wt 196 lb 9.6 oz (89.2 kg)   SpO2 98%   BMI 31.73 kg/m²          Physical Exam  Vitals and nursing note reviewed. Constitutional:       Appearance: Normal appearance. HENT:      Head: Normocephalic and atraumatic. Right Ear: Tympanic membrane normal.      Left Ear: Tympanic membrane normal.      Nose: Congestion present. Right Sinus: Maxillary sinus tenderness present. Left Sinus: Maxillary sinus tenderness present. Mouth/Throat:      Mouth: Mucous membranes are moist.      Pharynx: Posterior oropharyngeal erythema present. Cardiovascular:      Rate and Rhythm: Normal rate and regular rhythm. Pulses: Normal pulses. Heart sounds: Normal heart sounds. Pulmonary:      Effort: Pulmonary effort is normal.      Breath sounds: Normal breath sounds.    Abdominal: General: Abdomen is flat. Bowel sounds are normal.      Palpations: Abdomen is soft. Musculoskeletal:         General: Normal range of motion. Cervical back: Normal range of motion and neck supple. Skin:     General: Skin is warm and dry. Neurological:      General: No focal deficit present. Mental Status: She is alert and oriented to person, place, and time. Pertinent Labs/Studies:      Assessment and orders:       ICD-10-CM ICD-9-CM    1. Acute bacterial sinusitis  J01.90 461.9 amoxicillin-clavulanate (AUGMENTIN) 875-125 mg per tablet    B96.89     2. Mild intermittent asthma without complication  L72.56 995.42      Diagnoses and all orders for this visit:    1. Acute bacterial sinusitis  -     amoxicillin-clavulanate (AUGMENTIN) 875-125 mg per tablet; Take 1 Tablet by mouth every twelve (12) hours for 7 days. 2. Mild intermittent asthma without complication      Follow-up and Dispositions    · Return in about 2 months (around 2/2/2022), or if symptoms worsen or fail to improve. I have discussed the diagnosis with the parent of patient and the intended plan as seen in the above orders. Social history, medical history, and labs were reviewed. The parent of patient has received an after-visit summary and questions were answered concerning future plans. I have discussed medication side effects and warnings with the parent of patient as well.     MD MANDA Ling & MINDA SALAS [facility]  12/02/21

## 2021-12-02 NOTE — PROGRESS NOTES
1. Have you been to the ER, urgent care clinic since your last visit? Hospitalized since your last visit? No    2. Have you seen or consulted any other health care providers outside of the [address] [place] [address] since your last visit? Include any pap smears or colon screening. No    Reviewed record in preparation for visit and have necessary documentation  Pt did not bring medication to office visit for review  Patient is accompanied by self I have received verbal consent from Fernanda Montoya to discuss any/all medical information while they are present in the room.     Goals that were addressed and/or need to be completed during or after this appointment include     Health Maintenance Due   Topic Date Due    Pneumococcal 65+ years (2 of 2 - PPSV23) 10/09/2020    DTaP/Tdap/Td series (2 - Td or Tdap) 07/13/2021    COVID-19 Vaccine (3 - Booster for Moderna series) 08/24/2021    Bone Densitometry  11/08/2021

## 2022-01-18 ENCOUNTER — HOSPITAL ENCOUNTER (OUTPATIENT)
Dept: MAMMOGRAPHY | Age: 77
Discharge: HOME | End: 2022-01-18
Attending: FAMILY MEDICINE
Payer: MEDICARE

## 2022-01-18 DIAGNOSIS — Z12.31 SCREENING MAMMOGRAM FOR HIGH-RISK PATIENT: ICD-10-CM

## 2022-01-18 PROCEDURE — 77063 BREAST TOMOSYNTHESIS BI: CPT

## 2022-03-18 PROBLEM — Z86.73 HX OF STROKE WITHOUT RESIDUAL DEFICITS: Status: ACTIVE | Noted: 2021-02-22

## 2022-03-18 PROBLEM — H26.491 PCO (POSTERIOR CAPSULAR OPACIFICATION), RIGHT: Status: ACTIVE | Noted: 2017-07-10

## 2022-03-19 PROBLEM — K27.9 PUD (PEPTIC ULCER DISEASE): Status: ACTIVE | Noted: 2021-02-22

## 2022-03-19 PROBLEM — Z96.1 PSEUDOPHAKIA OF RIGHT EYE: Status: ACTIVE | Noted: 2017-07-10

## 2022-03-19 PROBLEM — H02.834 DERMATOCHALASIS OF LEFT UPPER EYELID: Status: ACTIVE | Noted: 2017-07-10

## 2022-03-19 PROBLEM — H25.12 CATARACT, NUCLEAR SCLEROTIC, LEFT EYE: Status: ACTIVE | Noted: 2017-07-10

## 2022-03-20 PROBLEM — K46.0 INCARCERATED HERNIA: Status: ACTIVE | Noted: 2019-11-12

## 2022-03-20 PROBLEM — H02.831 DERMATOCHALASIS OF RIGHT UPPER EYELID: Status: ACTIVE | Noted: 2017-07-10

## 2022-03-22 ENCOUNTER — OFFICE VISIT (OUTPATIENT)
Dept: FAMILY MEDICINE CLINIC | Age: 77
End: 2022-03-22
Payer: MEDICARE

## 2022-03-22 VITALS
DIASTOLIC BLOOD PRESSURE: 67 MMHG | OXYGEN SATURATION: 99 % | HEIGHT: 66 IN | WEIGHT: 198 LBS | BODY MASS INDEX: 31.82 KG/M2 | RESPIRATION RATE: 18 BRPM | TEMPERATURE: 97.2 F | SYSTOLIC BLOOD PRESSURE: 130 MMHG | HEART RATE: 60 BPM

## 2022-03-22 DIAGNOSIS — K27.9 PUD (PEPTIC ULCER DISEASE): ICD-10-CM

## 2022-03-22 DIAGNOSIS — Z23 ENCOUNTER FOR IMMUNIZATION: ICD-10-CM

## 2022-03-22 DIAGNOSIS — Z86.73 HX OF STROKE WITHOUT RESIDUAL DEFICITS: ICD-10-CM

## 2022-03-22 DIAGNOSIS — E55.9 VITAMIN D DEFICIENCY: ICD-10-CM

## 2022-03-22 DIAGNOSIS — E78.00 HYPERCHOLESTEREMIA: Primary | ICD-10-CM

## 2022-03-22 DIAGNOSIS — J45.20 MILD INTERMITTENT ASTHMA WITHOUT COMPLICATION: Chronic | ICD-10-CM

## 2022-03-22 PROCEDURE — G0009 ADMIN PNEUMOCOCCAL VACCINE: HCPCS | Performed by: FAMILY MEDICINE

## 2022-03-22 PROCEDURE — 99214 OFFICE O/P EST MOD 30 MIN: CPT | Performed by: FAMILY MEDICINE

## 2022-03-22 PROCEDURE — 1090F PRES/ABSN URINE INCON ASSESS: CPT | Performed by: FAMILY MEDICINE

## 2022-03-22 PROCEDURE — G8427 DOCREV CUR MEDS BY ELIG CLIN: HCPCS | Performed by: FAMILY MEDICINE

## 2022-03-22 PROCEDURE — G8417 CALC BMI ABV UP PARAM F/U: HCPCS | Performed by: FAMILY MEDICINE

## 2022-03-22 PROCEDURE — G8536 NO DOC ELDER MAL SCRN: HCPCS | Performed by: FAMILY MEDICINE

## 2022-03-22 PROCEDURE — 90732 PPSV23 VACC 2 YRS+ SUBQ/IM: CPT | Performed by: FAMILY MEDICINE

## 2022-03-22 PROCEDURE — G8399 PT W/DXA RESULTS DOCUMENT: HCPCS | Performed by: FAMILY MEDICINE

## 2022-03-22 PROCEDURE — G8510 SCR DEP NEG, NO PLAN REQD: HCPCS | Performed by: FAMILY MEDICINE

## 2022-03-22 PROCEDURE — 1101F PT FALLS ASSESS-DOCD LE1/YR: CPT | Performed by: FAMILY MEDICINE

## 2022-03-22 NOTE — PROGRESS NOTES
1. Have you been to the ER, urgent care clinic since your last visit? Hospitalized since your last visit? No    2. Have you seen or consulted any other health care providers outside of the [address] [place] [address] since your last visit? Including any pap smears or colon screening.  no      Health Maintenance Due   Topic Date Due    Pneumococcal 65+ years (2 of 2 - PPSV23) 10/09/2020    DTaP/Tdap/Td series (2 - Td or Tdap) 07/13/2021    COVID-19 Vaccine (3 - Booster for Moderna series) 07/24/2021    Bone Densitometry  11/08/2021

## 2022-03-22 NOTE — PROGRESS NOTES
[facility]    History of Present Illness: Lore Amos is a 64 y.o. female with history of PUD, HLD, GERD, Vitamin D   CC: Follow up Chronic conditions  History provided by patient and Records    HPI:  Gastroesophageal Reflux:  Current control of Symptoms: Stable  Primary symptoms: Peptic Ulcer/Indigestion  Hiatal Hernia: No  Current Medications: Prilosec  The patient has no history melena or bright red blood in the stools. The patient avoids high dose aspirin and NSAID therapy. The patient is aware of diet changes needed, elevating the head of the bed and appropriate use of antacids. Asthma: Stable    Hypertriglyceridemia Follow up:   Cardiovascular risks for her are: hypertension  hyperlipidemia. Current Medications:  Key Antihyperlipidemia Meds             atorvastatin (LIPITOR) 40 mg tablet (Taking) Take 1 Tablet by mouth daily. omega-3 fatty acids-vitamin e (FISH OIL) 1,000 mg cap (Taking) Take 1 Cap by mouth two (2) times a day. Compliance: YES   Myalgias: NO   Fatigue: NO   Other side effects: NO     Wt Readings from Last 3 Encounters:   03/22/22 198 lb (89.8 kg)   12/02/21 196 lb 9.6 oz (89.2 kg)   10/18/21 198 lb (89.8 kg)       Lab Results   Component Value Date/Time    Cholesterol, total 155 09/20/2021 09:55 AM    HDL Cholesterol 51 09/20/2021 09:55 AM    LDL, calculated 85.6 09/20/2021 09:55 AM    VLDL, calculated 18.4 09/20/2021 09:55 AM    Triglyceride 92 09/20/2021 09:55 AM    CHOL/HDL Ratio 3.0 09/20/2021 09:55 AM      Lab Results   Component Value Date/Time    ALT (SGPT) 25 09/20/2021 09:55 AM    AST (SGOT) 19 09/20/2021 09:55 AM    Alk.  phosphatase 85 09/20/2021 09:55 AM    Bilirubin, direct 0.24 08/18/2017 12:38 PM    Bilirubin, total 0.9 09/20/2021 09:55 AM           Health Maintenance  Health Maintenance Due   Topic Date Due    Pneumococcal 65+ years (2 of 2 - PPSV23) 10/09/2020    DTaP/Tdap/Td series (2 - Td or Tdap) 07/13/2021    COVID-19 Vaccine (3 - Booster for Moderna series) 07/24/2021    Bone Densitometry  11/08/2021       Past Medical, Family, and Social History:     Current Outpatient Medications on File Prior to Visit   Medication Sig Dispense Refill    atorvastatin (LIPITOR) 40 mg tablet Take 1 Tablet by mouth daily. 90 Tablet 1    albuterol (PROVENTIL HFA, VENTOLIN HFA, PROAIR HFA) 90 mcg/actuation inhaler Take 2 Puffs by inhalation every six (6) hours as needed for Wheezing. 1 Each 1    ascorbic acid, vitamin C, (Vitamin C) 125 mg chew Take 125 Tablets by mouth daily.  omeprazole (PRILOSEC) 20 mg capsule Take 1 Capsule by mouth two (2) times a day. 180 Capsule 1    triamcinolone acetonide (KENALOG) 0.1 % topical cream APPLY TO ARMS TWICE DAILY AS NEEDED FOR ITCHING 453.6 g 0    omega-3 fatty acids-vitamin e (FISH OIL) 1,000 mg cap Take 1 Cap by mouth two (2) times a day.  aspirin delayed-release 81 mg tablet Take 81 mg by mouth daily.  CALCIUM CARBONATE (ISAI-600 PO) Take  by mouth.  Cholecalciferol, Vitamin D3, (VITAMIN D) 2,000 unit Cap Take  by mouth.  VITAMIN B COMPLEX (VITAMIN B-100 COMPLEX PO) Take  by mouth. No current facility-administered medications on file prior to visit.        Patient Active Problem List   Diagnosis Code    Hypercholesteremia E78.00    Asthma J45.909    Vitamin D deficiency E55.9    Arthritis pain M19.90    Cataract, nuclear sclerotic, left eye H25.12    Dermatochalasis of left upper eyelid H02.834    Dermatochalasis of right upper eyelid H02.831    PCO (posterior capsular opacification), right H26.491    Pseudophakia of right eye Z96.1    Incarcerated hernia K46.0    Hx of stroke without residual deficits Z86.73    PUD (peptic ulcer disease) K27.9       Social History     Socioeconomic History    Marital status:    Tobacco Use    Smoking status: Never Smoker    Smokeless tobacco: Never Used   Substance and Sexual Activity    Alcohol use: No    Drug use: No        Review of Systems   Review of Systems   Constitutional: Negative for chills and fever. Cardiovascular: Negative for chest pain and palpitations. Gastrointestinal: Negative for abdominal pain, nausea and vomiting. Neurological: Negative for dizziness and headaches. Objective:     Visit Vitals  /67 (BP 1 Location: Right arm, BP Patient Position: Sitting)   Pulse 60   Temp 97.2 °F (36.2 °C) (Oral)   Resp 18   Ht 5' 6\" (1.676 m)   Wt 198 lb (89.8 kg)   SpO2 99%   BMI 31.96 kg/m²        Physical Exam  Vitals and nursing note reviewed. Constitutional:       Appearance: Normal appearance. HENT:      Head: Normocephalic and atraumatic. Cardiovascular:      Rate and Rhythm: Normal rate and regular rhythm. Pulses: Normal pulses. Heart sounds: Normal heart sounds. No murmur heard. No friction rub. No gallop. Pulmonary:      Effort: Pulmonary effort is normal.      Breath sounds: Normal breath sounds. Abdominal:      General: Abdomen is flat. Bowel sounds are normal.      Palpations: Abdomen is soft. Musculoskeletal:      Cervical back: Normal range of motion and neck supple. Skin:     General: Skin is warm and dry. Neurological:      Mental Status: She is alert. Pertinent Labs/Studies:      Assessment and orders:       ICD-10-CM ICD-9-CM    1. Hypercholesteremia  E78.00 272.0 LIPID PANEL   2. Vitamin D deficiency  E55.9 268.9 VITAMIN D, 25 HYDROXY   3. PUD (peptic ulcer disease)  K27.9 533.90 CBC W/O DIFF      METABOLIC PANEL, COMPREHENSIVE   4. Hx of stroke without residual deficits  Z86.73 V12.54    5. Mild intermittent asthma without complication  A36.75 402.41    6. Encounter for immunization  Z23 V03.89 PNEUMOCOCCAL POLYSACCHARIDE VACCINE, 23-VALENT, ADULT OR IMMUNOSUPPRESSED PT DOSE,     Diagnoses and all orders for this visit:    1.  Hypercholesteremia: The patient is aware of our goal to reduce or eliminate the long term problems (such as strokes and heart attacks) related to poorly controlled Triglycerides, LDL, Cholesterol.   -     LIPID PANEL; Future    2. Vitamin D deficiency: Labs  -     VITAMIN D, 25 HYDROXY; Future    3. PUD (peptic ulcer disease): Labs  -     CBC W/O DIFF; Future  -     METABOLIC PANEL, COMPREHENSIVE; Future    4. Hx of stroke without residual deficits    5. Mild intermittent asthma without complication    6. Encounter for immunization  -     PNEUMOCOCCAL POLYSACCHARIDE VACCINE, 23-VALENT, ADULT OR IMMUNOSUPPRESSED PT DOSE,      Follow-up and Dispositions    · Return in about 6 months (around 9/22/2022). I have discussed the diagnosis with the patient and the intended plan as seen in the above orders. Social history, medical history, and labs were reviewed. The patient has received an after-visit summary and questions were answered concerning future plans. I have discussed medication side effects and warnings with the patient as well.     MD MANDA Peterson & MINDA SALAS [facility]  03/22/22

## 2022-03-23 LAB
25(OH)D3 SERPL-MCNC: 51.3 NG/ML (ref 30–100)
ALBUMIN SERPL-MCNC: 3.8 G/DL (ref 3.5–5)
ALBUMIN/GLOB SERPL: 1.2 {RATIO} (ref 1.1–2.2)
ALP SERPL-CCNC: 88 U/L (ref 45–117)
ALT SERPL-CCNC: 31 U/L (ref 12–78)
ANION GAP SERPL CALC-SCNC: 4 MMOL/L (ref 5–15)
AST SERPL-CCNC: 19 U/L (ref 15–37)
BILIRUB SERPL-MCNC: 0.8 MG/DL (ref 0.2–1)
BUN SERPL-MCNC: 17 MG/DL (ref 6–20)
BUN/CREAT SERPL: 22 (ref 12–20)
CALCIUM SERPL-MCNC: 9.4 MG/DL (ref 8.5–10.1)
CHLORIDE SERPL-SCNC: 102 MMOL/L (ref 97–108)
CHOLEST SERPL-MCNC: 138 MG/DL
CO2 SERPL-SCNC: 27 MMOL/L (ref 21–32)
CREAT SERPL-MCNC: 0.76 MG/DL (ref 0.55–1.02)
ERYTHROCYTE [DISTWIDTH] IN BLOOD BY AUTOMATED COUNT: 13.9 % (ref 11.5–14.5)
GLOBULIN SER CALC-MCNC: 3.2 G/DL (ref 2–4)
GLUCOSE SERPL-MCNC: 94 MG/DL (ref 65–100)
HCT VFR BLD AUTO: 39.2 % (ref 35–47)
HDLC SERPL-MCNC: 51 MG/DL
HDLC SERPL: 2.7 {RATIO} (ref 0–5)
HGB BLD-MCNC: 12.8 G/DL (ref 11.5–16)
LDLC SERPL CALC-MCNC: 68 MG/DL (ref 0–100)
MCH RBC QN AUTO: 30.8 PG (ref 26–34)
MCHC RBC AUTO-ENTMCNC: 32.7 G/DL (ref 30–36.5)
MCV RBC AUTO: 94.2 FL (ref 80–99)
NRBC # BLD: 0 K/UL (ref 0–0.01)
NRBC BLD-RTO: 0 PER 100 WBC
PLATELET # BLD AUTO: 288 K/UL (ref 150–400)
PMV BLD AUTO: 10.9 FL (ref 8.9–12.9)
POTASSIUM SERPL-SCNC: 4.8 MMOL/L (ref 3.5–5.1)
PROT SERPL-MCNC: 7 G/DL (ref 6.4–8.2)
RBC # BLD AUTO: 4.16 M/UL (ref 3.8–5.2)
SODIUM SERPL-SCNC: 133 MMOL/L (ref 136–145)
TRIGL SERPL-MCNC: 95 MG/DL (ref ?–150)
VLDLC SERPL CALC-MCNC: 19 MG/DL
WBC # BLD AUTO: 6.2 K/UL (ref 3.6–11)

## 2022-06-14 NOTE — PROGRESS NOTES
Reviewed record in preparation for visit and have obtained necessary documentation. Patient did not bring medications to visit for review. Information provided on Advanced Directive, Living Will. Body mass index is 31.32 kg/(m^2).    Health Maintenance Due   Topic Date Due    INFLUENZA AGE 9 TO ADULT  08/01/2017 Not applicable

## 2022-08-19 ENCOUNTER — TELEPHONE (OUTPATIENT)
Dept: FAMILY MEDICINE CLINIC | Age: 77
End: 2022-08-19

## 2022-08-19 DIAGNOSIS — U07.1 COVID-19: Primary | ICD-10-CM

## 2022-08-19 RX ORDER — NIRMATRELVIR AND RITONAVIR 300-100 MG
KIT ORAL
Qty: 1 BOX | Refills: 0 | Status: SHIPPED | OUTPATIENT
Start: 2022-08-19

## 2022-08-19 NOTE — TELEPHONE ENCOUNTER
Pt took the home test for COVID19 last night. The symptoms started on Wednesday. She is having scratchy throat, chest congestion, head stopped up. Pt is worried more about getting the pneumonia again. Want to know can someone call in the medication PAXLOVID. Please call Pt.  Thanks

## 2022-08-19 NOTE — TELEPHONE ENCOUNTER
Patient notified of rx for paxlovid sent to [facility]. Notified of ER parameters and verbalizes understanding. Notified if not feeling better, to notify office for appt. Thanked for information.

## 2022-08-19 NOTE — TELEPHONE ENCOUNTER
Please let pt know: Normally we would require an appt for this, however there is nothing available today with multiple providers out of the office. Rx for Paxlovid sent to [facility] for her. If her symptoms worsen over the weekend or if she develops chest pain or shortness of breath, she needs to go to the ER. Otherwise if she is not improving as expected next week she will need an appt here to be seen.

## 2022-09-22 ENCOUNTER — OFFICE VISIT (OUTPATIENT)
Dept: FAMILY MEDICINE CLINIC | Age: 77
End: 2022-09-22
Payer: MEDICARE

## 2022-09-22 VITALS
RESPIRATION RATE: 17 BRPM | TEMPERATURE: 98.1 F | HEART RATE: 58 BPM | DIASTOLIC BLOOD PRESSURE: 69 MMHG | SYSTOLIC BLOOD PRESSURE: 127 MMHG | HEIGHT: 66 IN | BODY MASS INDEX: 31.98 KG/M2 | WEIGHT: 199 LBS | OXYGEN SATURATION: 95 %

## 2022-09-22 DIAGNOSIS — E55.9 VITAMIN D DEFICIENCY: ICD-10-CM

## 2022-09-22 DIAGNOSIS — Z00.00 MEDICARE ANNUAL WELLNESS VISIT, SUBSEQUENT: Primary | ICD-10-CM

## 2022-09-22 DIAGNOSIS — K27.9 PUD (PEPTIC ULCER DISEASE): ICD-10-CM

## 2022-09-22 DIAGNOSIS — J45.20 MILD INTERMITTENT ASTHMA WITHOUT COMPLICATION: Chronic | ICD-10-CM

## 2022-09-22 DIAGNOSIS — Z23 ENCOUNTER FOR IMMUNIZATION: ICD-10-CM

## 2022-09-22 DIAGNOSIS — Z78.0 POSTMENOPAUSAL STATE: ICD-10-CM

## 2022-09-22 DIAGNOSIS — E78.00 HYPERCHOLESTEREMIA: Chronic | ICD-10-CM

## 2022-09-22 PROCEDURE — G8536 NO DOC ELDER MAL SCRN: HCPCS | Performed by: FAMILY MEDICINE

## 2022-09-22 PROCEDURE — G0008 ADMIN INFLUENZA VIRUS VAC: HCPCS | Performed by: FAMILY MEDICINE

## 2022-09-22 PROCEDURE — 1123F ACP DISCUSS/DSCN MKR DOCD: CPT | Performed by: FAMILY MEDICINE

## 2022-09-22 PROCEDURE — G8510 SCR DEP NEG, NO PLAN REQD: HCPCS | Performed by: FAMILY MEDICINE

## 2022-09-22 PROCEDURE — 1101F PT FALLS ASSESS-DOCD LE1/YR: CPT | Performed by: FAMILY MEDICINE

## 2022-09-22 PROCEDURE — 1090F PRES/ABSN URINE INCON ASSESS: CPT | Performed by: FAMILY MEDICINE

## 2022-09-22 PROCEDURE — G8399 PT W/DXA RESULTS DOCUMENT: HCPCS | Performed by: FAMILY MEDICINE

## 2022-09-22 PROCEDURE — G8427 DOCREV CUR MEDS BY ELIG CLIN: HCPCS | Performed by: FAMILY MEDICINE

## 2022-09-22 PROCEDURE — G0439 PPPS, SUBSEQ VISIT: HCPCS | Performed by: FAMILY MEDICINE

## 2022-09-22 PROCEDURE — 90694 VACC AIIV4 NO PRSRV 0.5ML IM: CPT | Performed by: FAMILY MEDICINE

## 2022-09-22 PROCEDURE — 99214 OFFICE O/P EST MOD 30 MIN: CPT | Performed by: FAMILY MEDICINE

## 2022-09-22 PROCEDURE — G8417 CALC BMI ABV UP PARAM F/U: HCPCS | Performed by: FAMILY MEDICINE

## 2022-09-22 NOTE — PROGRESS NOTES
[facility]    History of Present Illness: Jessica Bernal is a 76 y.o. female with history of PUD, HLD, GERD, Vitamin D   CC: Follow up Chronic Conditions  History provided by patient and Records    HPI:  Leg swelling: Patient noting last week has had issues with some leg swelling. Noted last week more sitting and eating junk food. Gastroesophageal Reflux:  Current control of Symptoms: Stable  Primary symptoms: Peptic Ulcer/Indigestion  Hiatal Hernia: No  Current Medications: Prilosec  The patient has no history melena or bright red blood in the stools. The patient avoids high dose aspirin and NSAID therapy. The patient is aware of diet changes needed, elevating the head of the bed and appropriate use of antacids. Asthma: Stable    Hypertriglyceridemia Follow up:   Cardiovascular risks for her are: hypertension  hyperlipidemia. Current Medications:  Key Antihyperlipidemia Meds               atorvastatin (LIPITOR) 40 mg tablet (Taking) Take 1 Tablet by mouth daily. omega-3 fatty acids-vitamin e 1,000 mg cap (Taking) Take 1 Cap by mouth two (2) times a day. Compliance: YES   Myalgias: NO   Fatigue: NO   Other side effects: NO     Wt Readings from Last 3 Encounters:   09/22/22 199 lb (90.3 kg)   03/22/22 198 lb (89.8 kg)   12/02/21 196 lb 9.6 oz (89.2 kg)       Lab Results   Component Value Date/Time    Cholesterol, total 138 03/22/2022 10:40 AM    HDL Cholesterol 51 03/22/2022 10:40 AM    LDL, calculated 68 03/22/2022 10:40 AM    VLDL, calculated 19 03/22/2022 10:40 AM    Triglyceride 95 03/22/2022 10:40 AM    CHOL/HDL Ratio 2.7 03/22/2022 10:40 AM      Lab Results   Component Value Date/Time    ALT (SGPT) 31 03/22/2022 10:40 AM    AST (SGOT) 19 03/22/2022 10:40 AM    Alk.  phosphatase 88 03/22/2022 10:40 AM    Bilirubin, direct 0.24 08/18/2017 12:38 PM    Bilirubin, total 0.8 03/22/2022 10:40 AM           Health Maintenance  Health Maintenance Due   Topic Date Due DTaP/Tdap/Td series (2 - Td or Tdap) 07/13/2021    Bone Densitometry  11/08/2021    COVID-19 Vaccine (4 - Booster for Moderna series) 07/15/2022    Medicare Yearly Exam  10/19/2022       Past Medical, Family, and Social History:     Current Outpatient Medications on File Prior to Visit   Medication Sig Dispense Refill    omeprazole (PRILOSEC) 20 mg capsule TAKE ONE CAPSULE BY MOUTH TWICE DAILY 180 Capsule 1    atorvastatin (LIPITOR) 40 mg tablet Take 1 Tablet by mouth daily. 90 Tablet 1    albuterol (PROVENTIL HFA, VENTOLIN HFA, PROAIR HFA) 90 mcg/actuation inhaler Take 2 Puffs by inhalation every six (6) hours as needed for Wheezing. 1 Each 1    ascorbic acid, vitamin C, (Vitamin C) 125 mg chew Take 125 Tablets by mouth daily. triamcinolone acetonide (KENALOG) 0.1 % topical cream APPLY TO ARMS TWICE DAILY AS NEEDED FOR ITCHING 453.6 g 0    omega-3 fatty acids-vitamin e 1,000 mg cap Take 1 Cap by mouth two (2) times a day. aspirin delayed-release 81 mg tablet Take 81 mg by mouth daily. CALCIUM CARBONATE (ISAI-600 PO) Take  by mouth. cholecalciferol (VITAMIN D3) (2,000 UNITS /50 MCG) cap capsule Take  by mouth. VITAMIN B COMPLEX (VITAMIN B-100 COMPLEX PO) Take  by mouth. nirmatrelvir-ritonavir (Paxlovid, EUA,) 300 mg (150 mg x 2)-100 mg tablet Follow directions on the box. (Patient not taking: Reported on 9/22/2022) 1 Box 0     No current facility-administered medications on file prior to visit.        Patient Active Problem List   Diagnosis Code    Hypercholesteremia E78.00    Asthma J45.909    Vitamin D deficiency E55.9    Arthritis pain M19.90    Cataract, nuclear sclerotic, left eye H25.12    Dermatochalasis of left upper eyelid H02.834    Dermatochalasis of right upper eyelid H02.831    PCO (posterior capsular opacification), right H26.491    Pseudophakia of right eye Z96.1    Incarcerated hernia K46.0    Hx of stroke without residual deficits Z86.73    PUD (peptic ulcer disease) K27.9       Social History     Socioeconomic History    Marital status:    Tobacco Use    Smoking status: Never    Smokeless tobacco: Never   Substance and Sexual Activity    Alcohol use: No    Drug use: No     Social Determinants of Health     Financial Resource Strain: Low Risk     Difficulty of Paying Living Expenses: Not hard at all   Food Insecurity: No Food Insecurity    Worried About Running Out of Food in the Last Year: Never true    Ran Out of Food in the Last Year: Never true        Review of Systems   Review of Systems   Constitutional:  Negative for chills and fever. Respiratory:  Negative for cough and hemoptysis. Cardiovascular:  Negative for chest pain, palpitations and orthopnea. Gastrointestinal:  Negative for abdominal pain, nausea and vomiting. Neurological:  Negative for dizziness and headaches. Objective:   Visit Vitals  /69 (BP 1 Location: Left upper arm, BP Patient Position: Sitting, BP Cuff Size: Adult)   Pulse (!) 58   Temp 98.1 °F (36.7 °C) (Oral)   Resp 17   Ht 5' 6\" (1.676 m)   Wt 199 lb (90.3 kg)   SpO2 95%   BMI 32.12 kg/m²        Physical Exam  Vitals and nursing note reviewed. Constitutional:       Appearance: Normal appearance. HENT:      Head: Normocephalic and atraumatic. Cardiovascular:      Rate and Rhythm: Normal rate and regular rhythm. Pulses: Normal pulses. Heart sounds: Normal heart sounds. No murmur heard. No friction rub. No gallop. Pulmonary:      Effort: Pulmonary effort is normal.      Breath sounds: Normal breath sounds. Abdominal:      General: Abdomen is flat. Bowel sounds are normal.      Palpations: Abdomen is soft. Musculoskeletal:         General: Normal range of motion. Cervical back: Normal range of motion and neck supple. Skin:     General: Skin is warm and dry. Neurological:      Mental Status: She is alert.        Pertinent Labs/Studies:      Assessment and orders:       ICD-10-CM ICD-9-CM    1. Mild intermittent asthma without complication  C20.14 784.13       2. Hypercholesteremia  E78.00 272.0 LIPID PANEL      3. PUD (peptic ulcer disease)  K27.9 533.90 CBC W/O DIFF      METABOLIC PANEL, COMPREHENSIVE      4. Vitamin D deficiency  E55.9 268.9 VITAMIN D, 25 HYDROXY      5. Encounter for immunization  Z23 V03.89 INFLUENZA, FLUAD, (AGE 72 Y+), IM, PF, 0.5 ML      ADMIN INFLUENZA VIRUS VAC        Diagnoses and all orders for this visit:    1. Mild intermittent asthma without complication    2. Hypercholesteremia: The patient is aware of our goal to reduce or eliminate the long term problems (such as strokes and heart attacks) related to poorly controlled Triglycerides, LDL, Cholesterol.   -     LIPID PANEL; Future    3. PUD (peptic ulcer disease)  -     CBC W/O DIFF; Future  -     METABOLIC PANEL, COMPREHENSIVE; Future    4. Vitamin D deficiency  -     VITAMIN D, 25 HYDROXY; Future    5. Encounter for immunization  -     INFLUENZA, FLUAD, (AGE 72 Y+), IM, PF, 0.5 ML  -     ADMIN INFLUENZA VIRUS VAC    Follow-up and Dispositions    Return in about 6 months (around 3/22/2023). I have discussed the diagnosis with the patient and the intended plan as seen in the above orders. Social history, medical history, and labs were reviewed. The patient has received an after-visit summary and questions were answered concerning future plans. I have discussed medication side effects and warnings with the patient as well.     MD MANDA Gallardo & MINDA SALAS [facility]  09/22/22

## 2022-09-22 NOTE — PROGRESS NOTES
This is the Subsequent Medicare Annual Wellness Exam, performed 12 months or more after the Initial AWV or the last Subsequent AWV    I have reviewed the patient's medical history in detail and updated the computerized patient record. History     Past Medical History:   Diagnosis Date    Asthma 4/5/2010    Bronchitis, mucopurulent recurrent ([facility] [address] 5/14/2010    CVA (cerebral vascular accident) ([facility] [address] 4/5/2010    Hypercholesteremia 4/5/2010      Past Surgical History:   Procedure Laterality Date    HX HYSTERECTOMY      HX TONSILLECTOMY       No Known Allergies  Family History   Problem Relation Age of Onset    Stroke Mother     Heart Disease Father      Social History     Tobacco Use    Smoking status: Never    Smokeless tobacco: Never   Substance Use Topics    Alcohol use: No     Depression Risk Factor Screening:     3 most recent PHQ Screens 9/22/2022   Little interest or pleasure in doing things Not at all   Feeling down, depressed, irritable, or hopeless Not at all   Total Score PHQ 2 0     Alcohol Risk Factor Screening:    Do you average more than 1 drink per night or more than 7 drinks a week: No    In the past three months have you have had more than 4 drinks containing alcohol on one occasion: No  Functional Ability and Level of Safety:   Hearing Loss  The patient wears hearing aids. Activities of Daily Living  The home contains: no safety equipment. Patient does total self care  Ambulation: with no difficulty    Fall Risk  Fall Risk Assessment, last 12 mths 9/22/2022   Able to walk? Yes   Fall in past 12 months? 0   Do you feel unsteady? 0   Are you worried about falling 0       Abuse Screen  Abuse Screening Questionnaire 10/18/2021   Do you ever feel afraid of your partner? N   Are you in a relationship with someone who physically or mentally threatens you? N   Is it safe for you to go home?  Y     Cognitive Screening   Evaluation of Cognitive Function:  Has your family/caregiver stated any concerns about your memory: no  Normal    Patient Care Team   Patient Care Team:  Lavanda Lesches, MD as PCP - General (Family Medicine)  Lavanda Lesches, MD as PCP - [facility] Empaneled Provider  Angel Nava MD (Ophthalmology)  Halie Morrow MD (Otolaryngology)  Assessment/Plan   Education and counseling provided:  Are appropriate based on today's review and evaluation  End-of-Life planning (with patient's consent)    Diagnoses and all orders for this visit:    1. Medicare annual wellness visit, subsequent    2. Mild intermittent asthma without complication    3. Hypercholesteremia  -     LIPID PANEL; Future    4. PUD (peptic ulcer disease)  -     CBC W/O DIFF; Future  -     METABOLIC PANEL, COMPREHENSIVE; Future    5. Vitamin D deficiency  -     VITAMIN D, 25 HYDROXY; Future    6. Encounter for immunization  -     INFLUENZA, FLUAD, (AGE 65 Y+), IM, PF, 0.5 ML  -     ADMIN INFLUENZA VIRUS VAC    7. Postmenopausal state  -     DEXA BONE DENSITY STUDY AXIAL;  Future      Health Maintenance Topics with due status: Overdue       Topic Date Due    DTaP/Tdap/Td series 07/13/2021    Bone Densitometry 11/08/2021    COVID-19 Vaccine 07/15/2022     Health Maintenance Topics with due status: Not Due       Topic Last Completion Date    Depression Screen 03/22/2022    Lipid Screen 03/22/2022    Medicare Yearly Exam 09/22/2022     Health Maintenance Topics with due status: Completed       Topic Last Completion Date    Bone Densitometry (Dexa) Screening 11/08/2018    Shingrix Vaccine Age 50> 07/19/2019    Hepatitis C Screening 09/20/2021    Pneumococcal 65+ years 03/22/2022    Flu Vaccine 09/22/2022

## 2022-09-22 NOTE — PROGRESS NOTES
Chief Complaint   Patient presents with    Follow Up Chronic Condition     1. \"Have you been to the ER, urgent care clinic since your last visit? Hospitalized since your last visit? \" No    2. \"Have you seen or consulted any other health care providers outside of the [address] [place] [address] since your last visit? \" No     3. For patients aged 39-70: Has the patient had a colonoscopy / FIT/ Cologuard? NA - based on age      If the patient is female:    4. For patients aged 41-77: Has the patient had a mammogram within the past 2 years? NA - based on age or sex      11. For patients aged 21-65: Has the patient had a pap smear?  NA - based on age or sex    Health Maintenance Due   Topic Date Due    DTaP/Tdap/Td series (2 - Td or Tdap) 07/13/2021    COVID-19 Vaccine (3 - Booster for Moderna series) 07/24/2021    Bone Densitometry  11/08/2021    Flu Vaccine (1) 08/01/2022    Medicare Yearly Exam  10/19/2022

## 2022-09-23 LAB
25(OH)D3 SERPL-MCNC: 41.7 NG/ML (ref 30–100)
ALBUMIN SERPL-MCNC: 3.8 G/DL (ref 3.5–5)
ALBUMIN/GLOB SERPL: 1.2 {RATIO} (ref 1.1–2.2)
ALP SERPL-CCNC: 87 U/L (ref 45–117)
ALT SERPL-CCNC: 28 U/L (ref 12–78)
ANION GAP SERPL CALC-SCNC: 3 MMOL/L (ref 5–15)
AST SERPL-CCNC: 23 U/L (ref 15–37)
BILIRUB SERPL-MCNC: 0.9 MG/DL (ref 0.2–1)
BUN SERPL-MCNC: 21 MG/DL (ref 6–20)
BUN/CREAT SERPL: 24 (ref 12–20)
CALCIUM SERPL-MCNC: 9.7 MG/DL (ref 8.5–10.1)
CHLORIDE SERPL-SCNC: 105 MMOL/L (ref 97–108)
CHOLEST SERPL-MCNC: 140 MG/DL
CO2 SERPL-SCNC: 28 MMOL/L (ref 21–32)
CREAT SERPL-MCNC: 0.86 MG/DL (ref 0.55–1.02)
ERYTHROCYTE [DISTWIDTH] IN BLOOD BY AUTOMATED COUNT: 14.4 % (ref 11.5–14.5)
GLOBULIN SER CALC-MCNC: 3.2 G/DL (ref 2–4)
GLUCOSE SERPL-MCNC: 101 MG/DL (ref 65–100)
HCT VFR BLD AUTO: 39 % (ref 35–47)
HDLC SERPL-MCNC: 54 MG/DL
HDLC SERPL: 2.6 {RATIO} (ref 0–5)
HGB BLD-MCNC: 12.6 G/DL (ref 11.5–16)
LDLC SERPL CALC-MCNC: 67.8 MG/DL (ref 0–100)
MCH RBC QN AUTO: 30.2 PG (ref 26–34)
MCHC RBC AUTO-ENTMCNC: 32.3 G/DL (ref 30–36.5)
MCV RBC AUTO: 93.5 FL (ref 80–99)
NRBC # BLD: 0 K/UL (ref 0–0.01)
NRBC BLD-RTO: 0 PER 100 WBC
PLATELET # BLD AUTO: 295 K/UL (ref 150–400)
PMV BLD AUTO: 10.6 FL (ref 8.9–12.9)
POTASSIUM SERPL-SCNC: 4.8 MMOL/L (ref 3.5–5.1)
PROT SERPL-MCNC: 7 G/DL (ref 6.4–8.2)
RBC # BLD AUTO: 4.17 M/UL (ref 3.8–5.2)
SODIUM SERPL-SCNC: 136 MMOL/L (ref 136–145)
TRIGL SERPL-MCNC: 91 MG/DL (ref ?–150)
VLDLC SERPL CALC-MCNC: 18.2 MG/DL
WBC # BLD AUTO: 6.5 K/UL (ref 3.6–11)

## 2022-10-31 ENCOUNTER — HOSPITAL ENCOUNTER (OUTPATIENT)
Dept: MAMMOGRAPHY | Age: 77
Discharge: HOME | End: 2022-10-31
Attending: FAMILY MEDICINE
Payer: MEDICARE

## 2022-10-31 DIAGNOSIS — Z78.0 POSTMENOPAUSAL STATE: ICD-10-CM

## 2022-10-31 PROCEDURE — 77080 DXA BONE DENSITY AXIAL: CPT

## 2022-12-30 ENCOUNTER — TRANSCRIBE ORDER (OUTPATIENT)
Dept: SCHEDULING | Age: 77
End: 2022-12-30

## 2022-12-30 DIAGNOSIS — Z12.31 VISIT FOR SCREENING MAMMOGRAM: Primary | ICD-10-CM

## 2023-02-08 ENCOUNTER — HOSPITAL ENCOUNTER (OUTPATIENT)
Dept: MAMMOGRAPHY | Age: 78
Discharge: HOME | End: 2023-02-08
Attending: FAMILY MEDICINE
Payer: MEDICARE

## 2023-02-08 DIAGNOSIS — Z12.31 VISIT FOR SCREENING MAMMOGRAM: ICD-10-CM

## 2023-02-08 PROCEDURE — 77063 BREAST TOMOSYNTHESIS BI: CPT

## 2023-03-22 ENCOUNTER — OFFICE VISIT (OUTPATIENT)
Dept: FAMILY MEDICINE CLINIC | Age: 78
End: 2023-03-22
Payer: MEDICARE

## 2023-03-22 VITALS
HEART RATE: 67 BPM | WEIGHT: 200 LBS | DIASTOLIC BLOOD PRESSURE: 74 MMHG | TEMPERATURE: 97.5 F | SYSTOLIC BLOOD PRESSURE: 131 MMHG | RESPIRATION RATE: 18 BRPM | HEIGHT: 66 IN | OXYGEN SATURATION: 96 % | BODY MASS INDEX: 32.14 KG/M2

## 2023-03-22 DIAGNOSIS — J45.20 MILD INTERMITTENT ASTHMA WITHOUT COMPLICATION: ICD-10-CM

## 2023-03-22 DIAGNOSIS — K27.9 PUD (PEPTIC ULCER DISEASE): Primary | ICD-10-CM

## 2023-03-22 DIAGNOSIS — E78.00 HYPERCHOLESTEREMIA: ICD-10-CM

## 2023-03-22 DIAGNOSIS — E55.9 VITAMIN D DEFICIENCY: ICD-10-CM

## 2023-03-22 PROBLEM — H25.12 CATARACT, NUCLEAR SCLEROTIC, LEFT EYE: Status: RESOLVED | Noted: 2017-07-10 | Resolved: 2023-03-22

## 2023-03-22 PROCEDURE — 1090F PRES/ABSN URINE INCON ASSESS: CPT | Performed by: FAMILY MEDICINE

## 2023-03-22 PROCEDURE — 99214 OFFICE O/P EST MOD 30 MIN: CPT | Performed by: FAMILY MEDICINE

## 2023-03-22 PROCEDURE — G8417 CALC BMI ABV UP PARAM F/U: HCPCS | Performed by: FAMILY MEDICINE

## 2023-03-22 PROCEDURE — G8510 SCR DEP NEG, NO PLAN REQD: HCPCS | Performed by: FAMILY MEDICINE

## 2023-03-22 PROCEDURE — G8399 PT W/DXA RESULTS DOCUMENT: HCPCS | Performed by: FAMILY MEDICINE

## 2023-03-22 PROCEDURE — G8427 DOCREV CUR MEDS BY ELIG CLIN: HCPCS | Performed by: FAMILY MEDICINE

## 2023-03-22 PROCEDURE — 1123F ACP DISCUSS/DSCN MKR DOCD: CPT | Performed by: FAMILY MEDICINE

## 2023-03-22 PROCEDURE — G8536 NO DOC ELDER MAL SCRN: HCPCS | Performed by: FAMILY MEDICINE

## 2023-03-22 PROCEDURE — 1101F PT FALLS ASSESS-DOCD LE1/YR: CPT | Performed by: FAMILY MEDICINE

## 2023-03-22 NOTE — PROGRESS NOTES
1. Have you been to the ER, urgent care clinic since your last visit? Hospitalized since your last visit? yes pt first    2. Have you seen or consulted any other health care providers outside of the [address] [place] [address] since your last visit? Including any pap smears or colon screening.  Pt first      Health Maintenance Due   Topic Date Due    DTaP/Tdap/Td series (2 - Td or Tdap) 07/13/2021    COVID-19 Vaccine (4 - Booster for Moderna series) 05/10/2022

## 2023-03-22 NOTE — PROGRESS NOTES
[facility]    History of Present Illness: Paola Lauren is a 76 y.o. female with history of PUD, HLD, GERD, Vitamin D   CC: Follow up Chronic Conditions  History provided by patient and Records    HPI:  Gastroesophageal Reflux:  Current control of Symptoms: Stable  Primary symptoms: heartburn  Hiatal Hernia: No  Current Medications: Prilosec  The patient has no history melena or bright red blood in the stools. The patient avoids high dose aspirin and NSAID therapy. The patient is aware of diet changes needed, elevating the head of the bed and appropriate use of antacids. Hypertriglyceridemia Follow up:   Cardiovascular risks for her are: hyperlipidemia  sedentary lifestyle. Current Medications:  Key Antihyperlipidemia Meds               atorvastatin (LIPITOR) 40 mg tablet (Taking) TAKE ONE TABLET BY MOUTH EVERY DAY    omega-3 fatty acids-vitamin e 1,000 mg cap (Taking) Take 1 Cap by mouth two (2) times a day. Compliance: YES   Myalgias: NO   Fatigue: NO   Other side effects: NO     Wt Readings from Last 3 Encounters:   03/22/23 200 lb (90.7 kg)   09/22/22 199 lb (90.3 kg)   03/22/22 198 lb (89.8 kg)     Lab Results   Component Value Date/Time    Cholesterol, total 140 09/22/2022 09:40 AM    HDL Cholesterol 54 09/22/2022 09:40 AM    LDL, calculated 67.8 09/22/2022 09:40 AM    VLDL, calculated 18.2 09/22/2022 09:40 AM    Triglyceride 91 09/22/2022 09:40 AM    CHOL/HDL Ratio 2.6 09/22/2022 09:40 AM      Lab Results   Component Value Date/Time    ALT (SGPT) 28 09/22/2022 09:40 AM    AST (SGOT) 23 09/22/2022 09:40 AM    Alk.  phosphatase 87 09/22/2022 09:40 AM    Bilirubin, direct 0.24 08/18/2017 12:38 PM    Bilirubin, total 0.9 09/22/2022 09:40 AM      Vitamin D Deficiency: Is taking Supplement     Health Maintenance  Health Maintenance Due   Topic Date Due    DTaP/Tdap/Td series (2 - Td or Tdap) 07/13/2021    COVID-19 Vaccine (4 - Booster for Moderna series) 05/10/2022 Past Medical, Family, and Social History:     Current Outpatient Medications on File Prior to Visit   Medication Sig Dispense Refill    atorvastatin (LIPITOR) 40 mg tablet TAKE ONE TABLET BY MOUTH EVERY DAY 90 Tablet 1    omeprazole (PRILOSEC) 20 mg capsule TAKE ONE CAPSULE BY MOUTH TWICE DAILY 180 Capsule 1    albuterol (PROVENTIL HFA, VENTOLIN HFA, PROAIR HFA) 90 mcg/actuation inhaler Take 2 Puffs by inhalation every six (6) hours as needed for Wheezing. 1 Each 1    ascorbic acid, vitamin C, (Vitamin C) 125 mg chew Take 125 Tablets by mouth daily. triamcinolone acetonide (KENALOG) 0.1 % topical cream APPLY TO ARMS TWICE DAILY AS NEEDED FOR ITCHING 453.6 g 0    omega-3 fatty acids-vitamin e 1,000 mg cap Take 1 Cap by mouth two (2) times a day. aspirin delayed-release 81 mg tablet Take 81 mg by mouth daily. CALCIUM CARBONATE (ISAI-600 PO) Take  by mouth. cholecalciferol (VITAMIN D3) (2,000 UNITS /50 MCG) cap capsule Take  by mouth. VITAMIN B COMPLEX (VITAMIN B-100 COMPLEX PO) Take  by mouth. [DISCONTINUED] nirmatrelvir-ritonavir (Paxlovid, EUA,) 300 mg (150 mg x 2)-100 mg tablet Follow directions on the box. (Patient not taking: No sig reported) 1 Box 0     No current facility-administered medications on file prior to visit. Patient Active Problem List   Diagnosis Code    Hypercholesteremia E78.00    Asthma J45.909    Vitamin D deficiency E55.9    Arthritis pain M19.90    Dermatochalasis of left upper eyelid H02.834    Dermatochalasis of right upper eyelid H02.831    PCO (posterior capsular opacification), right H26.491    Pseudophakia of right eye Z96.1    Incarcerated hernia K46.0    Hx of stroke without residual deficits Z86.73    PUD (peptic ulcer disease) K27.9       Social History     Socioeconomic History    Marital status:    Tobacco Use    Smoking status: Never    Smokeless tobacco: Never   Substance and Sexual Activity    Alcohol use: No    Drug use:  No Social Determinants of Health     Financial Resource Strain: Low Risk     Difficulty of Paying Living Expenses: Not hard at all   Food Insecurity: No Food Insecurity    Worried About Running Out of Food in the Last Year: Never true    Ran Out of Food in the Last Year: Never true        Review of Systems   Review of Systems   Constitutional:  Negative for chills and fever. Respiratory:  Negative for cough and hemoptysis. Gastrointestinal:  Negative for abdominal pain, nausea and vomiting. Genitourinary:  Negative for dysuria and urgency. Objective:   Visit Vitals  /74 (BP 1 Location: Right arm, BP Patient Position: Sitting, BP Cuff Size: Adult)   Pulse 67   Temp 97.5 °F (36.4 °C) (Oral)   Resp 18   Ht 5' 6\" (1.676 m)   Wt 200 lb (90.7 kg)   SpO2 96%   BMI 32.28 kg/m²        Physical Exam  Vitals and nursing note reviewed. Constitutional:       Appearance: Normal appearance. HENT:      Head: Normocephalic and atraumatic. Cardiovascular:      Rate and Rhythm: Normal rate and regular rhythm. Pulses: Normal pulses. Heart sounds: Normal heart sounds. No murmur heard. No friction rub. No gallop. Pulmonary:      Effort: Pulmonary effort is normal.      Breath sounds: Normal breath sounds. Abdominal:      General: Abdomen is flat. Bowel sounds are normal.      Palpations: Abdomen is soft. Musculoskeletal:      Cervical back: Normal range of motion and neck supple. Skin:     General: Skin is warm and dry. Neurological:      Mental Status: She is alert. Pertinent Labs/Studies:      Assessment and orders:       ICD-10-CM ICD-9-CM    1. PUD (peptic ulcer disease)  K27.9 533.90 CBC W/O DIFF      METABOLIC PANEL, COMPREHENSIVE      2. Hypercholesteremia  E78.00 272.0 LIPID PANEL      3. Mild intermittent asthma without complication  T12.68 902.11       4. Vitamin D deficiency  E55.9 268.9 VITAMIN D, 25 HYDROXY        Diagnoses and all orders for this visit:    1.  PUD (peptic ulcer disease)  -     CBC W/O DIFF; Future  -     METABOLIC PANEL, COMPREHENSIVE; Future    2. Hypercholesteremia: The patient is aware of our goal to reduce or eliminate the long term problems (such as strokes and heart attacks) related to poorly controlled Triglycerides, LDL, Cholesterol.   -     LIPID PANEL; Future    3. Mild intermittent asthma without complication    4. Vitamin D deficiency  -     VITAMIN D, 25 HYDROXY; Future    Follow-up and Dispositions    Return in about 6 months (around 9/22/2023). I have discussed the diagnosis with the patient and the intended plan as seen in the above orders. Social history, medical history, and labs were reviewed. The patient has received an after-visit summary and questions were answered concerning future plans. I have discussed medication side effects and warnings with the patient as well.     MD MANDA Meneses & MINDA SALAS [facility]  03/22/23

## 2023-03-23 LAB
25(OH)D3 SERPL-MCNC: 41.8 NG/ML (ref 30–100)
ALBUMIN SERPL-MCNC: 3.8 G/DL (ref 3.5–5)
ALBUMIN/GLOB SERPL: 1.2 (ref 1.1–2.2)
ALP SERPL-CCNC: 78 U/L (ref 45–117)
ALT SERPL-CCNC: 28 U/L (ref 12–78)
ANION GAP SERPL CALC-SCNC: 4 MMOL/L (ref 5–15)
AST SERPL-CCNC: 24 U/L (ref 15–37)
BILIRUB SERPL-MCNC: 0.8 MG/DL (ref 0.2–1)
BUN SERPL-MCNC: 25 MG/DL (ref 6–20)
BUN/CREAT SERPL: 29 (ref 12–20)
CALCIUM SERPL-MCNC: 9.6 MG/DL (ref 8.5–10.1)
CHLORIDE SERPL-SCNC: 103 MMOL/L (ref 97–108)
CHOLEST SERPL-MCNC: 137 MG/DL
CO2 SERPL-SCNC: 29 MMOL/L (ref 21–32)
CREAT SERPL-MCNC: 0.86 MG/DL (ref 0.55–1.02)
ERYTHROCYTE [DISTWIDTH] IN BLOOD BY AUTOMATED COUNT: 14.9 % (ref 11.5–14.5)
GLOBULIN SER CALC-MCNC: 3.1 G/DL (ref 2–4)
GLUCOSE SERPL-MCNC: 104 MG/DL (ref 65–100)
HCT VFR BLD AUTO: 38.3 % (ref 35–47)
HDLC SERPL-MCNC: 57 MG/DL
HDLC SERPL: 2.4 (ref 0–5)
HGB BLD-MCNC: 11.9 G/DL (ref 11.5–16)
LDLC SERPL CALC-MCNC: 63.6 MG/DL (ref 0–100)
MCH RBC QN AUTO: 28.4 PG (ref 26–34)
MCHC RBC AUTO-ENTMCNC: 31.1 G/DL (ref 30–36.5)
MCV RBC AUTO: 91.4 FL (ref 80–99)
NRBC # BLD: 0 K/UL (ref 0–0.01)
NRBC BLD-RTO: 0 PER 100 WBC
PLATELET # BLD AUTO: 280 K/UL (ref 150–400)
PMV BLD AUTO: 11.3 FL (ref 8.9–12.9)
POTASSIUM SERPL-SCNC: 4.6 MMOL/L (ref 3.5–5.1)
PROT SERPL-MCNC: 6.9 G/DL (ref 6.4–8.2)
RBC # BLD AUTO: 4.19 M/UL (ref 3.8–5.2)
SODIUM SERPL-SCNC: 136 MMOL/L (ref 136–145)
TRIGL SERPL-MCNC: 82 MG/DL (ref ?–150)
VLDLC SERPL CALC-MCNC: 16.4 MG/DL
WBC # BLD AUTO: 6.2 K/UL (ref 3.6–11)

## 2023-03-31 ENCOUNTER — TELEPHONE (OUTPATIENT)
Dept: FAMILY MEDICINE CLINIC | Age: 78
End: 2023-03-31

## 2023-03-31 DIAGNOSIS — J45.20 MILD INTERMITTENT ASTHMA WITHOUT COMPLICATION: ICD-10-CM

## 2023-03-31 RX ORDER — ALBUTEROL SULFATE 90 UG/1
2 AEROSOL, METERED RESPIRATORY (INHALATION)
Qty: 1 EACH | Refills: 1 | Status: SHIPPED | OUTPATIENT
Start: 2023-03-31

## 2023-03-31 NOTE — TELEPHONE ENCOUNTER
Pt states she has issues with asthma and bronchitis. Pt was using a borrowed Nebulizer, however she is asking for an order for one of her own, along with neb solution. Pt states  is aware of this. Pt's last appt was 3-22. Please advise.

## 2023-04-03 ENCOUNTER — TELEPHONE (OUTPATIENT)
Dept: FAMILY MEDICINE CLINIC | Age: 78
End: 2023-04-03

## 2023-09-22 ENCOUNTER — OFFICE VISIT (OUTPATIENT)
Facility: CLINIC | Age: 78
End: 2023-09-22

## 2023-09-22 VITALS
HEIGHT: 66 IN | RESPIRATION RATE: 18 BRPM | TEMPERATURE: 98.5 F | DIASTOLIC BLOOD PRESSURE: 67 MMHG | OXYGEN SATURATION: 98 % | BODY MASS INDEX: 31.66 KG/M2 | HEART RATE: 58 BPM | SYSTOLIC BLOOD PRESSURE: 110 MMHG | WEIGHT: 197 LBS

## 2023-09-22 DIAGNOSIS — L03.313 CELLULITIS OF CHEST WALL: Primary | ICD-10-CM

## 2023-09-22 DIAGNOSIS — M19.90 ARTHRITIS PAIN: ICD-10-CM

## 2023-09-22 DIAGNOSIS — Z23 ENCOUNTER FOR IMMUNIZATION: ICD-10-CM

## 2023-09-22 DIAGNOSIS — E78.00 HYPERCHOLESTEREMIA: ICD-10-CM

## 2023-09-22 DIAGNOSIS — J45.20 MILD INTERMITTENT ASTHMA WITHOUT COMPLICATION: ICD-10-CM

## 2023-09-22 DIAGNOSIS — E55.9 VITAMIN D DEFICIENCY: ICD-10-CM

## 2023-09-22 PROBLEM — K46.0 INCARCERATED HERNIA: Status: RESOLVED | Noted: 2019-11-12 | Resolved: 2023-09-22

## 2023-09-22 RX ORDER — BACITRACIN ZINC AND POLYMYXIN B SULFATE 500; 1000 [USP'U]/G; [USP'U]/G
OINTMENT TOPICAL
Qty: 28.4 G | Refills: 1 | Status: SHIPPED | OUTPATIENT
Start: 2023-09-22 | End: 2023-09-29

## 2023-09-22 RX ORDER — VITAMIN B COMPLEX
1 CAPSULE ORAL DAILY
COMMUNITY

## 2023-09-22 RX ORDER — ALBUTEROL SULFATE 2.5 MG/3ML
2.5 SOLUTION RESPIRATORY (INHALATION) 4 TIMES DAILY PRN
Qty: 120 EACH | Refills: 3 | Status: SHIPPED | OUTPATIENT
Start: 2023-09-22

## 2023-09-22 RX ORDER — VITAMIN E 268 MG
400 CAPSULE ORAL DAILY
COMMUNITY

## 2023-09-22 SDOH — ECONOMIC STABILITY: INCOME INSECURITY: HOW HARD IS IT FOR YOU TO PAY FOR THE VERY BASICS LIKE FOOD, HOUSING, MEDICAL CARE, AND HEATING?: NOT HARD AT ALL

## 2023-09-22 SDOH — ECONOMIC STABILITY: FOOD INSECURITY: WITHIN THE PAST 12 MONTHS, YOU WORRIED THAT YOUR FOOD WOULD RUN OUT BEFORE YOU GOT MONEY TO BUY MORE.: NEVER TRUE

## 2023-09-22 SDOH — ECONOMIC STABILITY: HOUSING INSECURITY
IN THE LAST 12 MONTHS, WAS THERE A TIME WHEN YOU DID NOT HAVE A STEADY PLACE TO SLEEP OR SLEPT IN A SHELTER (INCLUDING NOW)?: NO

## 2023-09-22 SDOH — ECONOMIC STABILITY: FOOD INSECURITY: WITHIN THE PAST 12 MONTHS, THE FOOD YOU BOUGHT JUST DIDN'T LAST AND YOU DIDN'T HAVE MONEY TO GET MORE.: NEVER TRUE

## 2023-09-22 ASSESSMENT — ENCOUNTER SYMPTOMS
APNEA: 0
CHEST TIGHTNESS: 0

## 2023-09-22 ASSESSMENT — SOCIAL DETERMINANTS OF HEALTH (SDOH)
WITHIN THE LAST YEAR, HAVE TO BEEN RAPED OR FORCED TO HAVE ANY KIND OF SEXUAL ACTIVITY BY YOUR PARTNER OR EX-PARTNER?: NO
WITHIN THE LAST YEAR, HAVE YOU BEEN KICKED, HIT, SLAPPED, OR OTHERWISE PHYSICALLY HURT BY YOUR PARTNER OR EX-PARTNER?: NO
WITHIN THE LAST YEAR, HAVE YOU BEEN AFRAID OF YOUR PARTNER OR EX-PARTNER?: NO
WITHIN THE LAST YEAR, HAVE YOU BEEN HUMILIATED OR EMOTIONALLY ABUSED IN OTHER WAYS BY YOUR PARTNER OR EX-PARTNER?: NO

## 2023-09-22 NOTE — PROGRESS NOTES
[facility]    History of Present Illness: Jamilah Avila is a 78 y.o. female with history of PUD, HLD, GERD, Vitamin D   CC: Follow up  History provided by patient and Records    HPI:  Gastroesophageal Reflux:  Current control of Symptoms: Stable  Primary symptoms: heartburn  Hiatal Hernia: No  Current Medications: Prilosec  The patient has no history melena or bright red blood in the stools. The patient avoids high dose aspirin and NSAID therapy. The patient is aware of diet changes needed, elevating the head of the bed and appropriate use of antacids. Asthma: Overall well controlled at this time, needs nebulizer solution    Hypertriglyceridemia Follow up:   Cardiovascular risks for her are: hypertension  hyperlipidemia.    Current Medications:  atorvastatin - 40 MG    Compliance: Yes   Myalgias: No   Fatigue: No   Other side effects: No     Wt Readings from Last 3 Encounters:   09/22/23 197 lb (89.4 kg)   03/22/23 200 lb (90.7 kg)   09/22/22 199 lb (90.3 kg)       Lab Results   Component Value Date/Time    CHOL 137 03/22/2023 09:40 AM    HDL 57 03/22/2023 09:40 AM      Lab Results   Component Value Date/Time    ALT 28 03/22/2023 09:40 AM    AST 24 03/22/2023 09:40 AM         Health Maintenance  Health Maintenance Due   Topic Date Due    DTaP/Tdap/Td vaccine (2 - Td or Tdap) 07/13/2021    COVID-19 Vaccine (4 - Moderna series) 05/10/2022    Annual Wellness Visit (AWV)  09/23/2023       Past Medical, Family, and Social History:     Current Outpatient Medications on File Prior to Visit   Medication Sig Dispense Refill    vitamin E 400 UNIT capsule Take 1 capsule by mouth daily      b complex vitamins capsule Take 1 capsule by mouth daily      Omega-3 Fatty Acids (FISH OIL BURP-LESS PO) Take 1 capsule by mouth daily      albuterol sulfate HFA (PROVENTIL;VENTOLIN;PROAIR) 108 (90 Base) MCG/ACT inhaler Inhale 2 puffs into the lungs every 6 hours as needed      Ascorbic Acid 125 MG CHEW Take

## 2023-09-23 LAB
25(OH)D3 SERPL-MCNC: 44.6 NG/ML (ref 30–100)
ALBUMIN SERPL-MCNC: 4.1 G/DL (ref 3.5–5)
ALBUMIN/GLOB SERPL: 1.3 (ref 1.1–2.2)
ALP SERPL-CCNC: 93 U/L (ref 45–117)
ALT SERPL-CCNC: 29 U/L (ref 12–78)
ANION GAP SERPL CALC-SCNC: 5 MMOL/L (ref 5–15)
AST SERPL-CCNC: 21 U/L (ref 15–37)
BILIRUB SERPL-MCNC: 1.1 MG/DL (ref 0.2–1)
BUN SERPL-MCNC: 18 MG/DL (ref 6–20)
BUN/CREAT SERPL: 21 (ref 12–20)
CALCIUM SERPL-MCNC: 9.2 MG/DL (ref 8.5–10.1)
CHLORIDE SERPL-SCNC: 104 MMOL/L (ref 97–108)
CHOLEST SERPL-MCNC: 138 MG/DL
CO2 SERPL-SCNC: 27 MMOL/L (ref 21–32)
CREAT SERPL-MCNC: 0.86 MG/DL (ref 0.55–1.02)
ERYTHROCYTE [DISTWIDTH] IN BLOOD BY AUTOMATED COUNT: 14.9 % (ref 11.5–14.5)
GLOBULIN SER CALC-MCNC: 3.1 G/DL (ref 2–4)
GLUCOSE SERPL-MCNC: 102 MG/DL (ref 65–100)
HCT VFR BLD AUTO: 38.6 % (ref 35–47)
HDLC SERPL-MCNC: 55 MG/DL
HDLC SERPL: 2.5 (ref 0–5)
HGB BLD-MCNC: 12.2 G/DL (ref 11.5–16)
LDLC SERPL CALC-MCNC: 63.8 MG/DL (ref 0–100)
MCH RBC QN AUTO: 29 PG (ref 26–34)
MCHC RBC AUTO-ENTMCNC: 31.6 G/DL (ref 30–36.5)
MCV RBC AUTO: 91.7 FL (ref 80–99)
NRBC # BLD: 0 K/UL (ref 0–0.01)
NRBC BLD-RTO: 0 PER 100 WBC
PLATELET # BLD AUTO: 317 K/UL (ref 150–400)
PMV BLD AUTO: 10.4 FL (ref 8.9–12.9)
POTASSIUM SERPL-SCNC: 5 MMOL/L (ref 3.5–5.1)
PROT SERPL-MCNC: 7.2 G/DL (ref 6.4–8.2)
RBC # BLD AUTO: 4.21 M/UL (ref 3.8–5.2)
SODIUM SERPL-SCNC: 136 MMOL/L (ref 136–145)
TRIGL SERPL-MCNC: 96 MG/DL
VLDLC SERPL CALC-MCNC: 19.2 MG/DL
WBC # BLD AUTO: 7.1 K/UL (ref 3.6–11)

## 2023-10-31 RX ORDER — OMEPRAZOLE 20 MG/1
20 CAPSULE, DELAYED RELEASE ORAL 2 TIMES DAILY
Qty: 180 CAPSULE | Refills: 1 | Status: SHIPPED | OUTPATIENT
Start: 2023-10-31

## 2024-01-19 RX ORDER — ATORVASTATIN CALCIUM 40 MG/1
40 TABLET, FILM COATED ORAL DAILY
Qty: 90 TABLET | Refills: 1 | Status: SHIPPED | OUTPATIENT
Start: 2024-01-19

## 2024-01-31 ENCOUNTER — TRANSCRIBE ORDERS (OUTPATIENT)
Facility: HOSPITAL | Age: 79
End: 2024-01-31

## 2024-01-31 DIAGNOSIS — Z12.31 SCREENING MAMMOGRAM FOR HIGH-RISK PATIENT: Primary | ICD-10-CM

## 2024-02-09 ENCOUNTER — HOSPITAL ENCOUNTER (OUTPATIENT)
Facility: HOSPITAL | Age: 79
End: 2024-02-09
Attending: FAMILY MEDICINE
Payer: MEDICARE

## 2024-02-09 VITALS — BODY MASS INDEX: 31.66 KG/M2 | WEIGHT: 197 LBS | HEIGHT: 66 IN

## 2024-02-09 DIAGNOSIS — Z12.31 SCREENING MAMMOGRAM FOR HIGH-RISK PATIENT: ICD-10-CM

## 2024-02-09 PROCEDURE — 77063 BREAST TOMOSYNTHESIS BI: CPT

## 2024-03-05 DIAGNOSIS — J45.20 MILD INTERMITTENT ASTHMA, UNCOMPLICATED: ICD-10-CM

## 2024-03-05 RX ORDER — ALBUTEROL SULFATE 90 UG/1
AEROSOL, METERED RESPIRATORY (INHALATION)
Qty: 8.5 G | Refills: 1 | Status: SHIPPED | OUTPATIENT
Start: 2024-03-05

## 2024-03-21 ENCOUNTER — OFFICE VISIT (OUTPATIENT)
Facility: CLINIC | Age: 79
End: 2024-03-21

## 2024-03-21 VITALS
OXYGEN SATURATION: 98 % | SYSTOLIC BLOOD PRESSURE: 129 MMHG | HEART RATE: 60 BPM | BODY MASS INDEX: 31.98 KG/M2 | WEIGHT: 199 LBS | RESPIRATION RATE: 18 BRPM | DIASTOLIC BLOOD PRESSURE: 76 MMHG | TEMPERATURE: 97.1 F | HEIGHT: 66 IN

## 2024-03-21 DIAGNOSIS — E55.9 VITAMIN D DEFICIENCY: ICD-10-CM

## 2024-03-21 DIAGNOSIS — Z00.00 MEDICARE ANNUAL WELLNESS VISIT, SUBSEQUENT: Primary | ICD-10-CM

## 2024-03-21 DIAGNOSIS — K27.9 PUD (PEPTIC ULCER DISEASE): ICD-10-CM

## 2024-03-21 DIAGNOSIS — E78.00 HYPERCHOLESTEREMIA: ICD-10-CM

## 2024-03-21 DIAGNOSIS — J45.21 MILD INTERMITTENT ASTHMA WITH ACUTE EXACERBATION: ICD-10-CM

## 2024-03-21 DIAGNOSIS — H02.831 DERMATOCHALASIS OF RIGHT UPPER EYELID: ICD-10-CM

## 2024-03-21 RX ORDER — ALBUTEROL SULFATE 90 UG/1
AEROSOL, METERED RESPIRATORY (INHALATION)
Qty: 8.5 G | Refills: 1 | Status: SHIPPED | OUTPATIENT
Start: 2024-03-21

## 2024-03-21 RX ORDER — ALBUTEROL SULFATE 2.5 MG/3ML
2.5 SOLUTION RESPIRATORY (INHALATION) 4 TIMES DAILY PRN
Qty: 120 EACH | Refills: 3 | Status: SHIPPED | OUTPATIENT
Start: 2024-03-21

## 2024-03-21 RX ORDER — PREDNISONE 10 MG/1
10 TABLET ORAL DAILY
Qty: 7 TABLET | Refills: 0 | Status: SHIPPED | OUTPATIENT
Start: 2024-03-21 | End: 2024-03-28

## 2024-03-21 ASSESSMENT — ANXIETY QUESTIONNAIRES
GAD7 TOTAL SCORE: 0
6. BECOMING EASILY ANNOYED OR IRRITABLE: NOT AT ALL
5. BEING SO RESTLESS THAT IT IS HARD TO SIT STILL: NOT AT ALL
4. TROUBLE RELAXING: NOT AT ALL
3. WORRYING TOO MUCH ABOUT DIFFERENT THINGS: NOT AT ALL
2. NOT BEING ABLE TO STOP OR CONTROL WORRYING: NOT AT ALL
IF YOU CHECKED OFF ANY PROBLEMS ON THIS QUESTIONNAIRE, HOW DIFFICULT HAVE THESE PROBLEMS MADE IT FOR YOU TO DO YOUR WORK, TAKE CARE OF THINGS AT HOME, OR GET ALONG WITH OTHER PEOPLE: NOT DIFFICULT AT ALL
1. FEELING NERVOUS, ANXIOUS, OR ON EDGE: NOT AT ALL
7. FEELING AFRAID AS IF SOMETHING AWFUL MIGHT HAPPEN: NOT AT ALL

## 2024-03-21 ASSESSMENT — ENCOUNTER SYMPTOMS
ABDOMINAL PAIN: 0
CHEST TIGHTNESS: 0
WHEEZING: 1
COUGH: 1

## 2024-03-21 ASSESSMENT — PATIENT HEALTH QUESTIONNAIRE - PHQ9
SUM OF ALL RESPONSES TO PHQ QUESTIONS 1-9: 0
SUM OF ALL RESPONSES TO PHQ QUESTIONS 1-9: 0
2. FEELING DOWN, DEPRESSED OR HOPELESS: NOT AT ALL
1. LITTLE INTEREST OR PLEASURE IN DOING THINGS: NOT AT ALL
SUM OF ALL RESPONSES TO PHQ QUESTIONS 1-9: 0
SUM OF ALL RESPONSES TO PHQ QUESTIONS 1-9: 0
SUM OF ALL RESPONSES TO PHQ9 QUESTIONS 1 & 2: 0

## 2024-03-21 NOTE — PROGRESS NOTES
1. \"Have you been to the ER, urgent care clinic since your last visit?  Hospitalized since your last visit?\" no    2. \"Have you seen or consulted any other health care providers outside of the [facility] since your last visit?\" no     Health Maintenance Due   Topic Date Due    Respiratory Syncytial Virus (RSV) Pregnant or age 60 yrs+ (1 - 1-dose 60+ series) Never done    DTaP/Tdap/Td vaccine (2 - Td or Tdap) 07/13/2021    COVID-19 Vaccine (4 - 2023-24 season) 09/01/2023    Annual Wellness Visit (Medicare)  11/27/2023    Depression Screen  03/22/2024      
Automatic Reconciliation, Ar       CareTeam (Including outside providers/suppliers regularly involved in providing care):   Patient Care Team:  Luis Maxwell MD as PCP - General  Luis Maxwell MD as PCP - Empaneled Provider     Reviewed and updated this visit:  Tobacco  Allergies  Meds  Problems  Med Hx  Surg Hx  Soc Hx  Fam Hx             
(PROVENTIL) (2.5 MG/3ML) 0.083% nebulizer solution; Take 3 mLs by nebulization 4 times daily as needed for Wheezing  Dispense: 120 each; Refill: 3  - albuterol sulfate HFA (PROVENTIL;VENTOLIN;PROAIR) 108 (90 Base) MCG/ACT inhaler; INHALE TWO PUFFS BY INHALATION EVERY SIX HOURS AS NEEDED FOR WHEEZING  Dispense: 8.5 g; Refill: 1  - predniSONE (DELTASONE) 10 MG tablet; Take 1 tablet by mouth daily for 7 days  Dispense: 7 tablet; Refill: 0    2. Hypercholesteremia  - Comprehensive Metabolic Panel; Future  - CBC; Future  - Lipid Panel; Future    3. Vitamin D deficiency    4. Dermatochalasis of right upper eyelid    5. PUD (peptic ulcer disease)       Follow-up and Dispositions    Return in about 3 months (around 6/21/2024).           I have discussed the diagnosis with the patient and the intended plan as seen in the above orders.  Social history, medical history, and labs were reviewed.  The patient has received an after-visit summary and questions were answered concerning future plans.  I have discussed medication side effects and warnings with the patient as well.    Luis Maxwell MD  [facility]  03/21/24

## 2024-03-21 NOTE — PATIENT INSTRUCTIONS
For many people, walking is a good choice. Or you may want to swim, bike, or do other activities. Bit by bit, increase the time you're active every day. Try for at least 30 minutes on most days of the week.     Try to quit or cut back on using tobacco and other nicotine products. This includes smoking and vaping. If you need help quitting, talk to your doctor about stop-smoking programs and medicines. These can increase your chances of quitting for good. Quitting is one of the most important things you can do to protect your heart. It is never too late to quit. Try to avoid secondhand smoke too.     Stay at a weight that's healthy for you. Talk to your doctor if you need help losing weight.     Try to get 7 to 9 hours of sleep each night.     Limit alcohol to 2 drinks a day for men and 1 drink a day for women. Too much alcohol can cause health problems.     Manage other health problems such as diabetes, high blood pressure, and high cholesterol. If you think you may have a problem with alcohol or drug use, talk to your doctor.   Medicines    Take your medicines exactly as prescribed. Call your doctor if you think you are having a problem with your medicine.     If your doctor recommends aspirin, take the amount directed each day. Make sure you take aspirin and not another kind of pain reliever, such as acetaminophen (Tylenol).   When should you call for help?   Call 911 if you have symptoms of a heart attack. These may include:    Chest pain or pressure, or a strange feeling in the chest.     Sweating.     Shortness of breath.     Pain, pressure, or a strange feeling in the back, neck, jaw, or upper belly or in one or both shoulders or arms.     Lightheadedness or sudden weakness.     A fast or irregular heartbeat.   After you call 911, the  may tell you to chew 1 adult-strength or 2 to 4 low-dose aspirin. Wait for an ambulance. Do not try to drive yourself.  Watch closely for changes in your health, and be

## 2024-03-22 LAB
25(OH)D3 SERPL-MCNC: 36.6 NG/ML (ref 30–100)
ALBUMIN SERPL-MCNC: 3.7 G/DL (ref 3.5–5)
ALBUMIN/GLOB SERPL: 1.2 (ref 1.1–2.2)
ALP SERPL-CCNC: 79 U/L (ref 45–117)
ALT SERPL-CCNC: 25 U/L (ref 12–78)
ANION GAP SERPL CALC-SCNC: 5 MMOL/L (ref 5–15)
AST SERPL-CCNC: 18 U/L (ref 15–37)
BILIRUB SERPL-MCNC: 1 MG/DL (ref 0.2–1)
BUN SERPL-MCNC: 19 MG/DL (ref 6–20)
BUN/CREAT SERPL: 23 (ref 12–20)
CALCIUM SERPL-MCNC: 9.3 MG/DL (ref 8.5–10.1)
CHLORIDE SERPL-SCNC: 105 MMOL/L (ref 97–108)
CHOLEST SERPL-MCNC: 145 MG/DL
CO2 SERPL-SCNC: 28 MMOL/L (ref 21–32)
CREAT SERPL-MCNC: 0.82 MG/DL (ref 0.55–1.02)
ERYTHROCYTE [DISTWIDTH] IN BLOOD BY AUTOMATED COUNT: 15.1 % (ref 11.5–14.5)
GLOBULIN SER CALC-MCNC: 3.1 G/DL (ref 2–4)
GLUCOSE SERPL-MCNC: 104 MG/DL (ref 65–100)
HCT VFR BLD AUTO: 35.2 % (ref 35–47)
HDLC SERPL-MCNC: 63 MG/DL
HDLC SERPL: 2.3 (ref 0–5)
HGB BLD-MCNC: 11.1 G/DL (ref 11.5–16)
LDLC SERPL CALC-MCNC: 68.4 MG/DL (ref 0–100)
MCH RBC QN AUTO: 28.1 PG (ref 26–34)
MCHC RBC AUTO-ENTMCNC: 31.5 G/DL (ref 30–36.5)
MCV RBC AUTO: 89.1 FL (ref 80–99)
NRBC # BLD: 0 K/UL (ref 0–0.01)
NRBC BLD-RTO: 0 PER 100 WBC
PLATELET # BLD AUTO: 310 K/UL (ref 150–400)
PMV BLD AUTO: 11.6 FL (ref 8.9–12.9)
POTASSIUM SERPL-SCNC: 4.7 MMOL/L (ref 3.5–5.1)
PROT SERPL-MCNC: 6.8 G/DL (ref 6.4–8.2)
RBC # BLD AUTO: 3.95 M/UL (ref 3.8–5.2)
SODIUM SERPL-SCNC: 138 MMOL/L (ref 136–145)
TRIGL SERPL-MCNC: 68 MG/DL
VLDLC SERPL CALC-MCNC: 13.6 MG/DL
WBC # BLD AUTO: 5.3 K/UL (ref 3.6–11)

## 2024-05-20 ENCOUNTER — TELEPHONE (OUTPATIENT)
Facility: CLINIC | Age: 79
End: 2024-05-20

## 2024-05-20 NOTE — TELEPHONE ENCOUNTER
----- Message from Ginger Birmingham sent at 5/20/2024  8:33 AM EDT -----  Subject: Message to Provider    QUESTIONS  Information for Provider? Pt would like to speak to Dr Maxwell before   making a hospital FU appointment. Will need sodium levels will need   checked as well. Try Home number if no response on Mobile.   ---------------------------------------------------------------------------  --------------  CALL BACK INFO  [phone]; OK to leave message on voicemail  ---------------------------------------------------------------------------  --------------  SCRIPT ANSWERS  Relationship to Patient? Self

## 2024-05-24 ENCOUNTER — OFFICE VISIT (OUTPATIENT)
Facility: CLINIC | Age: 79
End: 2024-05-24

## 2024-05-24 VITALS
RESPIRATION RATE: 18 BRPM | HEIGHT: 66 IN | SYSTOLIC BLOOD PRESSURE: 125 MMHG | HEART RATE: 71 BPM | TEMPERATURE: 97.3 F | BODY MASS INDEX: 33.37 KG/M2 | WEIGHT: 207.6 LBS | OXYGEN SATURATION: 95 % | DIASTOLIC BLOOD PRESSURE: 74 MMHG

## 2024-05-24 DIAGNOSIS — Z09 HOSPITAL DISCHARGE FOLLOW-UP: ICD-10-CM

## 2024-05-24 DIAGNOSIS — B34.8 INFECTION DUE TO HUMAN METAPNEUMOVIRUS (HMPV): ICD-10-CM

## 2024-05-24 DIAGNOSIS — E87.1 HYPONATREMIA: ICD-10-CM

## 2024-05-24 DIAGNOSIS — J45.21 MILD INTERMITTENT ASTHMA WITH EXACERBATION: Primary | ICD-10-CM

## 2024-05-24 DIAGNOSIS — J96.01 ACUTE RESPIRATORY FAILURE WITH HYPOXIA (HCC): ICD-10-CM

## 2024-05-24 LAB
ANION GAP SERPL CALC-SCNC: 9 MMOL/L (ref 5–15)
BUN SERPL-MCNC: 28 MG/DL (ref 6–20)
BUN/CREAT SERPL: 37 (ref 12–20)
CALCIUM SERPL-MCNC: 9.1 MG/DL (ref 8.5–10.1)
CHLORIDE SERPL-SCNC: 95 MMOL/L (ref 97–108)
CO2 SERPL-SCNC: 26 MMOL/L (ref 21–32)
CREAT SERPL-MCNC: 0.75 MG/DL (ref 0.55–1.02)
GLUCOSE SERPL-MCNC: 105 MG/DL (ref 65–100)
POTASSIUM SERPL-SCNC: 4.5 MMOL/L (ref 3.5–5.1)
SODIUM SERPL-SCNC: 130 MMOL/L (ref 136–145)

## 2024-05-24 RX ORDER — BUDESONIDE AND FORMOTEROL FUMARATE DIHYDRATE 160; 4.5 UG/1; UG/1
AEROSOL RESPIRATORY (INHALATION)
COMMUNITY
Start: 2024-05-17 | End: 2024-05-24 | Stop reason: SDUPTHER

## 2024-05-24 RX ORDER — FLUTICASONE PROPIONATE AND SALMETEROL 100; 50 UG/1; UG/1
1 POWDER RESPIRATORY (INHALATION) 2 TIMES DAILY
Qty: 60 EACH | Refills: 5 | Status: SHIPPED | OUTPATIENT
Start: 2024-05-24

## 2024-05-24 RX ORDER — PREDNISONE 10 MG/1
TABLET ORAL
COMMUNITY
Start: 2024-05-17

## 2024-05-24 ASSESSMENT — ENCOUNTER SYMPTOMS
COUGH: 0
WHEEZING: 0

## 2024-05-24 NOTE — PROGRESS NOTES
Chief Complaint   Patient presents with    Follow-Up from Hospital     DC'd from JW-Virus, Asthma, Hyponatremia       \"Have you been to the ER, urgent care clinic since your last visit?  Hospitalized since your last visit?\"    YES-JW    “Have you seen or consulted any other health care providers outside of [facility] since your last visit?”    NO              Health Maintenance Due   Topic Date Due    Respiratory Syncytial Virus (RSV) Pregnant or age 60 yrs+ (1 - 1-dose 60+ series) Never done    DTaP/Tdap/Td vaccine (2 - Td or Tdap) 07/13/2021    COVID-19 Vaccine (4 - 2023-24 season) 09/01/2023

## 2024-05-24 NOTE — PROGRESS NOTES
[facility] [address] [address]  [phone]        Transition of Care Visit    Patient: Nina Solomon MRN: [id number]  SSN: [id number]    YOB: 1945  Age: 78 y.o.  Sex: female      Hospital: [facility]  Dates of admission: 5/7/24 through 5/17/24  Discharge diagnoses: Acute Hypoxic respiratory failure, Asthma exacerbation, Human Metapneumovirus  State of health at discharge: Stable  Surgical or invasive procedures done: None    Amount and/or Complexity of Data Reviewed:   Clinical lab tests: Reviewed or ordered   Tests in the radiology section: reviewed or ordered  Discussion of test results with the patient: yes  Obtain previous medical records or obtain history from someone other than the patient: Yes  Obtain history from someone other than the patient: No  Review and address past medical records: Yes  Discuss the patient with another provider: no  Independant visualization of image, tracing, or specimen: no    Risk of Significant Complications, Morbidity, and/or Mortality:   Presenting problems: High   Diagnostic procedures: Moderate   Management options: Moderate     Transition of Care time spent:   Total time providing care and documentation: 40-60 minutes   Progress at discharge:   Stable on Discharge      Progress Note    Patient: Nina Solomon MRN: [id number]  SSN: [id number]    YOB: 1945  Age: 78 y.o.  Sex: female        Chief Complaint   Patient presents with    Follow-Up from Hospital     DC'd from JW-Virus, Asthma, Hyponatremia         Subjective:     Encounter Diagnoses   Name Primary?    Mild intermittent asthma with exacerbation Yes    Acute respiratory failure with hypoxia (HCC)     Infection due to human metapneumovirus (hMPV)     Hospital discharge follow-up     Hyponatremia      Patient developed 2-3 weeks of worsening cough, SOB, nad fatigue.  [facility] and despite Steroids went to ER for

## 2024-05-30 ENCOUNTER — TELEPHONE (OUTPATIENT)
Facility: CLINIC | Age: 79
End: 2024-05-30

## 2024-05-30 NOTE — TELEPHONE ENCOUNTER
Called and left .    Follow up about Labs: Your sodium is lowering agin.  I want you to drink fluids like Gatorade that have extra salt for now and repeat this labs in 1-2 weeks.    Luis Maxwell MD  [facility]  05/30/24

## 2024-05-31 ENCOUNTER — TELEPHONE (OUTPATIENT)
Facility: CLINIC | Age: 79
End: 2024-05-31

## 2024-05-31 NOTE — TELEPHONE ENCOUNTER
Called patient to ask what day in the next 1-2 weeks work well for her to come in and have labs done. Dr. Maxwell will have orders put in by that day.   Unable to hear patient on phone. Attempted 2x.

## 2024-06-04 ENCOUNTER — TELEPHONE (OUTPATIENT)
Facility: CLINIC | Age: 79
End: 2024-06-04

## 2024-06-04 DIAGNOSIS — E87.1 LOW SODIUM LEVELS: Primary | ICD-10-CM

## 2024-06-04 NOTE — TELEPHONE ENCOUNTER
----- Message from Cande Siu sent at 6/4/2024  9:35 AM EDT -----  Subject: Referral Request    Reason for referral request? Test sodium again per letter sent 5/30  Provider patient wants to be referred to(if known): Luis Maxwell III    Provider Phone Number(if known):    Additional Information for Provider? Patient has been drinking the   Gatorade at time of call since result discussion. Please call back to   advise that order is ready and to book follow up.   ---------------------------------------------------------------------------  --------------  CALL BACK INFO    [phone]; OK to leave message on voicemail  ---------------------------------------------------------------------------  --------------

## 2024-06-11 ENCOUNTER — TELEPHONE (OUTPATIENT)
Facility: CLINIC | Age: 79
End: 2024-06-11

## 2024-06-11 DIAGNOSIS — E87.1 LOW SODIUM LEVELS: ICD-10-CM

## 2024-06-11 NOTE — TELEPHONE ENCOUNTER
Called and left VM.    Called to discuss with patient, wanted to confirm wanted to see cardiology.  There is some data to show concern for RSV affecting heart, but I wanted to confirm if she was having symtpms ore not at this point.      Luis Maxwell MD  [facility]  06/11/24

## 2024-06-11 NOTE — TELEPHONE ENCOUNTER
Pt stated she had RSV and would like to see a heart doctor. She prefers to see Dr Raji Boucher ([facility]). Please advise.

## 2024-06-12 ENCOUNTER — TELEPHONE (OUTPATIENT)
Facility: CLINIC | Age: 79
End: 2024-06-12

## 2024-06-12 DIAGNOSIS — R06.09 DYSPNEA ON EXERTION: Primary | ICD-10-CM

## 2024-06-12 LAB
ANION GAP SERPL CALC-SCNC: 8 MMOL/L (ref 5–15)
BUN SERPL-MCNC: 9 MG/DL (ref 6–20)
BUN/CREAT SERPL: 14 (ref 12–20)
CALCIUM SERPL-MCNC: 8.8 MG/DL (ref 8.5–10.1)
CHLORIDE SERPL-SCNC: 101 MMOL/L (ref 97–108)
CO2 SERPL-SCNC: 25 MMOL/L (ref 21–32)
CREAT SERPL-MCNC: 0.65 MG/DL (ref 0.55–1.02)
GLUCOSE SERPL-MCNC: 121 MG/DL (ref 65–100)
POTASSIUM SERPL-SCNC: 4.3 MMOL/L (ref 3.5–5.1)
SODIUM SERPL-SCNC: 134 MMOL/L (ref 136–145)

## 2024-06-12 NOTE — TELEPHONE ENCOUNTER
Pt returned call, advised of Dr. Maxwell msg:    Pt verbalized understanding. Pt also stated she requested the referral for cards due to recent RSV and due to SOB. Pt states she has been having to stop and take breaks while completing adl's.

## 2024-06-12 NOTE — TELEPHONE ENCOUNTER
Called and left VM.     Called to advise on labs, her Sodium has improved.  It is not back to normal yet but has improved so keep up what she has been doing.  Also to confirm the request for a cardiology referral related to recent RSV and if she is having any particular symptoms at this time.    Luis Maxwell MD  [facility]  06/12/24

## 2024-06-19 ENCOUNTER — OFFICE VISIT (OUTPATIENT)
Facility: CLINIC | Age: 79
End: 2024-06-19
Payer: MEDICARE

## 2024-06-19 VITALS
DIASTOLIC BLOOD PRESSURE: 72 MMHG | TEMPERATURE: 97.3 F | OXYGEN SATURATION: 96 % | HEART RATE: 72 BPM | SYSTOLIC BLOOD PRESSURE: 139 MMHG | RESPIRATION RATE: 14 BRPM | BODY MASS INDEX: 32.41 KG/M2 | WEIGHT: 200.8 LBS

## 2024-06-19 DIAGNOSIS — J45.20 MILD INTERMITTENT ASTHMA WITHOUT COMPLICATION: Primary | ICD-10-CM

## 2024-06-19 DIAGNOSIS — E87.1 HYPONATREMIA: ICD-10-CM

## 2024-06-19 DIAGNOSIS — E55.9 VITAMIN D DEFICIENCY: ICD-10-CM

## 2024-06-19 DIAGNOSIS — E78.00 HYPERCHOLESTEREMIA: ICD-10-CM

## 2024-06-19 PROCEDURE — G8427 DOCREV CUR MEDS BY ELIG CLIN: HCPCS | Performed by: FAMILY MEDICINE

## 2024-06-19 PROCEDURE — 99214 OFFICE O/P EST MOD 30 MIN: CPT | Performed by: FAMILY MEDICINE

## 2024-06-19 PROCEDURE — 1036F TOBACCO NON-USER: CPT | Performed by: FAMILY MEDICINE

## 2024-06-19 PROCEDURE — G8417 CALC BMI ABV UP PARAM F/U: HCPCS | Performed by: FAMILY MEDICINE

## 2024-06-19 PROCEDURE — 1123F ACP DISCUSS/DSCN MKR DOCD: CPT | Performed by: FAMILY MEDICINE

## 2024-06-19 PROCEDURE — 1090F PRES/ABSN URINE INCON ASSESS: CPT | Performed by: FAMILY MEDICINE

## 2024-06-19 PROCEDURE — G8399 PT W/DXA RESULTS DOCUMENT: HCPCS | Performed by: FAMILY MEDICINE

## 2024-06-19 ASSESSMENT — ENCOUNTER SYMPTOMS
CHEST TIGHTNESS: 0
ABDOMINAL PAIN: 0

## 2024-06-19 NOTE — PROGRESS NOTES
Chief Complaint   Patient presents with    3 Month Follow-Up        \"Have you been to the ER, urgent care clinic since your last visit?  Hospitalized since your last visit?\"    NO    “Have you seen or consulted any other health care providers outside of [facility] since your last visit?”    NO            Click Here for Release of Records Request     Health Maintenance Due   Topic Date Due    Respiratory Syncytial Virus (RSV) Pregnant or age 60 yrs+ (1 - 1-dose 60+ series) Never done    DTaP/Tdap/Td vaccine (2 - Td or Tdap) 07/13/2021    COVID-19 Vaccine (4 - 2023-24 season) 09/01/2023

## 2024-06-19 NOTE — PROGRESS NOTES
[facility]    History of Present Illness:   Nina Solomon is a 78 y.o. female with history of PUD, HLD, GERD, Vitamin D    CC: Follow up   History provided by patient and Records    HPI:  Gastroesophageal Reflux:  Current control of Symptoms: Stable  Primary symptoms: heartburn  Hiatal Hernia: No  Current Medications: Prilosec  The patient has no history melena or bright red blood in the stools.  The patient avoids high dose aspirin and NSAID therapy.  The patient is aware of diet changes needed, elevating the head of the bed and appropriate use of antacids.      Asthma: Noting that she is having some persistent issues with WADSWORTH now after hospitalization.  Denies chest pains.  Notes some palpitation with going up stairs.     Hyponatremia: Resolving, and on review likely related to steroid usage in hospital and after.    Hypertriglyceridemia Follow up:   Cardiovascular risks for her are: hypertension  hyperlipidemia.   Current Medications:  atorvastatin - 40 MG    Compliance: Yes   Myalgias: No   Fatigue: No   Other side effects: No     Wt Readings from Last 3 Encounters:   06/19/24 91.1 kg (200 lb 12.8 oz)   05/24/24 94.2 kg (207 lb 9.6 oz)   03/21/24 90.3 kg (199 lb)       Lab Results   Component Value Date/Time    CHOL 145 03/21/2024 09:15 AM    HDL 63 03/21/2024 09:15 AM    LDL 68.4 03/21/2024 09:15 AM    VLDL 13.6 03/21/2024 09:15 AM      Lab Results   Component Value Date/Time    ALT 25 03/21/2024 09:15 AM    AST 18 03/21/2024 09:15 AM          Health Maintenance  Health Maintenance Due   Topic Date Due    Respiratory Syncytial Virus (RSV) Pregnant or age 60 yrs+ (1 - 1-dose 60+ series) Never done    DTaP/Tdap/Td vaccine (2 - Td or Tdap) 07/13/2021    COVID-19 Vaccine (4 - 2023-24 season) 09/01/2023       Past Medical, Family, and Social History:     Current Outpatient Medications on File Prior to Visit   Medication Sig Dispense Refill    fluticasone-salmeterol (ADVAIR) 100-50 MCG/ACT

## 2024-06-20 LAB
25(OH)D3 SERPL-MCNC: 37.2 NG/ML (ref 30–100)
ALBUMIN SERPL-MCNC: 3.2 G/DL (ref 3.5–5)
ALBUMIN/GLOB SERPL: 0.9 (ref 1.1–2.2)
ALP SERPL-CCNC: 97 U/L (ref 45–117)
ALT SERPL-CCNC: 17 U/L (ref 12–78)
ANION GAP SERPL CALC-SCNC: 4 MMOL/L (ref 5–15)
AST SERPL-CCNC: 13 U/L (ref 15–37)
BILIRUB SERPL-MCNC: 0.6 MG/DL (ref 0.2–1)
BUN SERPL-MCNC: 10 MG/DL (ref 6–20)
BUN/CREAT SERPL: 16 (ref 12–20)
CALCIUM SERPL-MCNC: 9.3 MG/DL (ref 8.5–10.1)
CHLORIDE SERPL-SCNC: 102 MMOL/L (ref 97–108)
CHOLEST SERPL-MCNC: 144 MG/DL
CO2 SERPL-SCNC: 26 MMOL/L (ref 21–32)
CREAT SERPL-MCNC: 0.61 MG/DL (ref 0.55–1.02)
ERYTHROCYTE [DISTWIDTH] IN BLOOD BY AUTOMATED COUNT: 17.4 % (ref 11.5–14.5)
GLOBULIN SER CALC-MCNC: 3.4 G/DL (ref 2–4)
GLUCOSE SERPL-MCNC: 109 MG/DL (ref 65–100)
HCT VFR BLD AUTO: 36.1 % (ref 35–47)
HDLC SERPL-MCNC: 39 MG/DL
HDLC SERPL: 3.7 (ref 0–5)
HGB BLD-MCNC: 10.9 G/DL (ref 11.5–16)
LDLC SERPL CALC-MCNC: 79.8 MG/DL (ref 0–100)
MCH RBC QN AUTO: 26.2 PG (ref 26–34)
MCHC RBC AUTO-ENTMCNC: 30.2 G/DL (ref 30–36.5)
MCV RBC AUTO: 86.8 FL (ref 80–99)
NRBC # BLD: 0 K/UL (ref 0–0.01)
NRBC BLD-RTO: 0 PER 100 WBC
PLATELET # BLD AUTO: 525 K/UL (ref 150–400)
PMV BLD AUTO: 9.5 FL (ref 8.9–12.9)
POTASSIUM SERPL-SCNC: 4.8 MMOL/L (ref 3.5–5.1)
PROT SERPL-MCNC: 6.6 G/DL (ref 6.4–8.2)
RBC # BLD AUTO: 4.16 M/UL (ref 3.8–5.2)
SODIUM SERPL-SCNC: 132 MMOL/L (ref 136–145)
TRIGL SERPL-MCNC: 126 MG/DL
VLDLC SERPL CALC-MCNC: 25.2 MG/DL
WBC # BLD AUTO: 7.6 K/UL (ref 3.6–11)

## 2024-06-24 ENCOUNTER — TELEPHONE (OUTPATIENT)
Facility: CLINIC | Age: 79
End: 2024-06-24

## 2024-06-24 NOTE — TELEPHONE ENCOUNTER
Called and left VM.    Sodium is still low. Not as low as previously but still depressed. I would recommend continuing taking gatorade.     Luis Maxwell MD  [facility]  06/24/24

## 2024-09-20 ENCOUNTER — OFFICE VISIT (OUTPATIENT)
Facility: CLINIC | Age: 79
End: 2024-09-20

## 2024-09-20 VITALS
DIASTOLIC BLOOD PRESSURE: 60 MMHG | HEART RATE: 75 BPM | BODY MASS INDEX: 32.78 KG/M2 | TEMPERATURE: 97.8 F | OXYGEN SATURATION: 98 % | SYSTOLIC BLOOD PRESSURE: 116 MMHG | HEIGHT: 66 IN | RESPIRATION RATE: 20 BRPM | WEIGHT: 204 LBS

## 2024-09-20 DIAGNOSIS — E87.1 HYPONATREMIA: ICD-10-CM

## 2024-09-20 DIAGNOSIS — Z23 NEED FOR VACCINATION: ICD-10-CM

## 2024-09-20 DIAGNOSIS — E78.00 HYPERCHOLESTEREMIA: ICD-10-CM

## 2024-09-20 DIAGNOSIS — J45.20 MILD INTERMITTENT ASTHMA WITHOUT COMPLICATION: Primary | ICD-10-CM

## 2024-09-20 DIAGNOSIS — K27.9 PUD (PEPTIC ULCER DISEASE): ICD-10-CM

## 2024-09-20 DIAGNOSIS — E55.9 VITAMIN D DEFICIENCY: ICD-10-CM

## 2024-09-20 RX ORDER — CLOTRIMAZOLE AND BETAMETHASONE DIPROPIONATE 10; .64 MG/G; MG/G
CREAM TOPICAL
COMMUNITY
Start: 2024-08-20

## 2024-09-20 ASSESSMENT — ENCOUNTER SYMPTOMS
CHEST TIGHTNESS: 0
APNEA: 0
ABDOMINAL DISTENTION: 0

## 2024-09-23 DIAGNOSIS — E78.00 HYPERCHOLESTEREMIA: ICD-10-CM

## 2024-09-23 DIAGNOSIS — E87.1 HYPONATREMIA: ICD-10-CM

## 2024-09-24 LAB
ALBUMIN SERPL-MCNC: 3.5 G/DL (ref 3.5–5)
ALBUMIN/GLOB SERPL: 1.3 (ref 1.1–2.2)
ALP SERPL-CCNC: 96 U/L (ref 45–117)
ALT SERPL-CCNC: 26 U/L (ref 12–78)
ANION GAP SERPL CALC-SCNC: 5 MMOL/L (ref 2–12)
AST SERPL-CCNC: 20 U/L (ref 15–37)
BILIRUB SERPL-MCNC: 0.7 MG/DL (ref 0.2–1)
BUN SERPL-MCNC: 13 MG/DL (ref 6–20)
BUN/CREAT SERPL: 17 (ref 12–20)
CALCIUM SERPL-MCNC: 9.1 MG/DL (ref 8.5–10.1)
CHLORIDE SERPL-SCNC: 107 MMOL/L (ref 97–108)
CHOLEST SERPL-MCNC: 136 MG/DL
CO2 SERPL-SCNC: 25 MMOL/L (ref 21–32)
CREAT SERPL-MCNC: 0.77 MG/DL (ref 0.55–1.02)
ERYTHROCYTE [DISTWIDTH] IN BLOOD BY AUTOMATED COUNT: 15.9 % (ref 11.5–14.5)
GLOBULIN SER CALC-MCNC: 2.7 G/DL (ref 2–4)
GLUCOSE SERPL-MCNC: 100 MG/DL (ref 65–100)
HCT VFR BLD AUTO: 30.9 % (ref 35–47)
HDLC SERPL-MCNC: 53 MG/DL
HDLC SERPL: 2.6 (ref 0–5)
HGB BLD-MCNC: 9.5 G/DL (ref 11.5–16)
LDLC SERPL CALC-MCNC: 65.2 MG/DL (ref 0–100)
MCH RBC QN AUTO: 26.4 PG (ref 26–34)
MCHC RBC AUTO-ENTMCNC: 30.7 G/DL (ref 30–36.5)
MCV RBC AUTO: 85.8 FL (ref 80–99)
NRBC # BLD: 0 K/UL (ref 0–0.01)
NRBC BLD-RTO: 0 PER 100 WBC
PLATELET # BLD AUTO: 388 K/UL (ref 150–400)
PMV BLD AUTO: 11.2 FL (ref 8.9–12.9)
POTASSIUM SERPL-SCNC: 4.4 MMOL/L (ref 3.5–5.1)
PROT SERPL-MCNC: 6.2 G/DL (ref 6.4–8.2)
RBC # BLD AUTO: 3.6 M/UL (ref 3.8–5.2)
SODIUM SERPL-SCNC: 137 MMOL/L (ref 136–145)
TRIGL SERPL-MCNC: 89 MG/DL
VLDLC SERPL CALC-MCNC: 17.8 MG/DL
WBC # BLD AUTO: 5.2 K/UL (ref 3.6–11)

## 2025-01-20 ENCOUNTER — TRANSCRIBE ORDERS (OUTPATIENT)
Facility: HOSPITAL | Age: 80
End: 2025-01-20

## 2025-01-20 DIAGNOSIS — Z12.31 ENCOUNTER FOR SCREENING MAMMOGRAM FOR MALIGNANT NEOPLASM OF BREAST: Primary | ICD-10-CM

## 2025-01-22 ENCOUNTER — OFFICE VISIT (OUTPATIENT)
Facility: CLINIC | Age: 80
End: 2025-01-22

## 2025-01-22 VITALS
TEMPERATURE: 97.4 F | SYSTOLIC BLOOD PRESSURE: 126 MMHG | DIASTOLIC BLOOD PRESSURE: 67 MMHG | HEART RATE: 86 BPM | BODY MASS INDEX: 32.82 KG/M2 | OXYGEN SATURATION: 97 % | RESPIRATION RATE: 18 BRPM | WEIGHT: 204.2 LBS | HEIGHT: 66 IN

## 2025-01-22 DIAGNOSIS — J45.20 MILD INTERMITTENT ASTHMA WITHOUT COMPLICATION: Primary | ICD-10-CM

## 2025-01-22 DIAGNOSIS — K27.9 PUD (PEPTIC ULCER DISEASE): ICD-10-CM

## 2025-01-22 DIAGNOSIS — R06.09 DOE (DYSPNEA ON EXERTION): ICD-10-CM

## 2025-01-22 DIAGNOSIS — E55.9 VITAMIN D DEFICIENCY: ICD-10-CM

## 2025-01-22 DIAGNOSIS — E78.00 HYPERCHOLESTEREMIA: ICD-10-CM

## 2025-01-22 DIAGNOSIS — D64.9 ANEMIA, UNSPECIFIED TYPE: ICD-10-CM

## 2025-01-22 DIAGNOSIS — Z86.73 HX OF STROKE WITHOUT RESIDUAL DEFICITS: ICD-10-CM

## 2025-01-22 SDOH — ECONOMIC STABILITY: FOOD INSECURITY: WITHIN THE PAST 12 MONTHS, YOU WORRIED THAT YOUR FOOD WOULD RUN OUT BEFORE YOU GOT MONEY TO BUY MORE.: NEVER TRUE

## 2025-01-22 SDOH — ECONOMIC STABILITY: FOOD INSECURITY: WITHIN THE PAST 12 MONTHS, THE FOOD YOU BOUGHT JUST DIDN'T LAST AND YOU DIDN'T HAVE MONEY TO GET MORE.: NEVER TRUE

## 2025-01-22 ASSESSMENT — PATIENT HEALTH QUESTIONNAIRE - PHQ9
1. LITTLE INTEREST OR PLEASURE IN DOING THINGS: NOT AT ALL
SUM OF ALL RESPONSES TO PHQ QUESTIONS 1-9: 0
SUM OF ALL RESPONSES TO PHQ9 QUESTIONS 1 & 2: 0
SUM OF ALL RESPONSES TO PHQ QUESTIONS 1-9: 0
2. FEELING DOWN, DEPRESSED OR HOPELESS: NOT AT ALL
SUM OF ALL RESPONSES TO PHQ QUESTIONS 1-9: 0
SUM OF ALL RESPONSES TO PHQ QUESTIONS 1-9: 0

## 2025-01-22 ASSESSMENT — ENCOUNTER SYMPTOMS
CHEST TIGHTNESS: 0
APNEA: 0
ABDOMINAL PAIN: 0
CHOKING: 0
ANAL BLEEDING: 0
ABDOMINAL DISTENTION: 0

## 2025-01-22 NOTE — PROGRESS NOTES
\"Have you been to the ER, urgent care clinic since your last visit?  Hospitalized since your last visit?\"    Yes [facility]    “Have you seen or consulted any other health care providers outside of [facility] since your last visit?”    No            Click Here for Release of Records Request

## 2025-01-22 NOTE — PROGRESS NOTES
[facility]    History of Present Illness:   Nina Solomon is a 79 y.o. female with history of PUD, HLD, GERD, Vitamin D    CC: Follow up  History provided by patient and Records    HPI:  Gastroesophageal Reflux:  Current control of Symptoms: Stable  Primary symptoms: heartburn  Hiatal Hernia: No  Current Medications: Prilosec  The patient has no history melena or bright red blood in the stools.  The patient avoids high dose aspirin and NSAID therapy.  The patient is aware of diet changes needed, elevating the head of the bed and appropriate use of antacids.      Asthma: Noting has some significant WADSWORTH with activities.  Unclear if having wheezing with episodes but has not been using Advair or Albuterol regularly.  Had recent cardiac testing that was negative for cardiac etiology.  Recent Endoscopy also looked good.    Hypertriglyceridemia Follow up:   Cardiovascular risks for her are: hyperlipidemia.   Current Medications:  atorvastatin - 40 MG    Compliance: Yes   Myalgias: No   Fatigue: No   Other side effects: No     Wt Readings from Last 3 Encounters:   01/22/25 92.6 kg (204 lb 3.2 oz)   09/20/24 92.5 kg (204 lb)   06/19/24 91.1 kg (200 lb 12.8 oz)       Lab Results   Component Value Date/Time    CHOL 136 09/23/2024 09:10 AM    HDL 53 09/23/2024 09:10 AM    LDL 65.2 09/23/2024 09:10 AM    LDL 68.4 03/21/2024 09:15 AM    VLDL 17.8 09/23/2024 09:10 AM      Lab Results   Component Value Date/Time    ALT 26 09/23/2024 09:10 AM    AST 20 09/23/2024 09:10 AM         Health Maintenance  Health Maintenance Due   Topic Date Due    Respiratory Syncytial Virus (RSV) Pregnant or age 60 yrs+ (1 - 1-dose 75+ series) Never done    DTaP/Tdap/Td vaccine (2 - Td or Tdap) 07/13/2021    COVID-19 Vaccine (4 - 2023-24 season) 09/01/2024       Past Medical, Family, and Social History:     Current Outpatient Medications on File Prior to Visit   Medication Sig Dispense Refill    omeprazole (PRILOSEC) 20 MG delayed

## 2025-01-23 ENCOUNTER — TELEPHONE (OUTPATIENT)
Facility: CLINIC | Age: 80
End: 2025-01-23

## 2025-01-23 DIAGNOSIS — D50.8 IRON DEFICIENCY ANEMIA SECONDARY TO INADEQUATE DIETARY IRON INTAKE: Primary | ICD-10-CM

## 2025-01-23 LAB
25(OH)D3 SERPL-MCNC: 27 NG/ML (ref 30–100)
ALBUMIN SERPL-MCNC: 3.7 G/DL (ref 3.5–5)
ALBUMIN/GLOB SERPL: 1.2 (ref 1.1–2.2)
ALP SERPL-CCNC: 103 U/L (ref 45–117)
ALT SERPL-CCNC: 27 U/L (ref 12–78)
ANION GAP SERPL CALC-SCNC: 7 MMOL/L (ref 2–12)
AST SERPL-CCNC: 20 U/L (ref 15–37)
BILIRUB SERPL-MCNC: 1 MG/DL (ref 0.2–1)
BUN SERPL-MCNC: 16 MG/DL (ref 6–20)
BUN/CREAT SERPL: 22 (ref 12–20)
CALCIUM SERPL-MCNC: 9.5 MG/DL (ref 8.5–10.1)
CHLORIDE SERPL-SCNC: 102 MMOL/L (ref 97–108)
CHOLEST SERPL-MCNC: 127 MG/DL
CO2 SERPL-SCNC: 25 MMOL/L (ref 21–32)
CREAT SERPL-MCNC: 0.73 MG/DL (ref 0.55–1.02)
ERYTHROCYTE [DISTWIDTH] IN BLOOD BY AUTOMATED COUNT: 18.2 % (ref 11.5–14.5)
FERRITIN SERPL-MCNC: 5 NG/ML (ref 8–252)
GLOBULIN SER CALC-MCNC: 3.1 G/DL (ref 2–4)
GLUCOSE SERPL-MCNC: 102 MG/DL (ref 65–100)
HCT VFR BLD AUTO: 31.4 % (ref 35–47)
HDLC SERPL-MCNC: 61 MG/DL
HDLC SERPL: 2.1 (ref 0–5)
HGB BLD-MCNC: 9.2 G/DL (ref 11.5–16)
IRON SATN MFR SERPL: 4 % (ref 20–50)
IRON SERPL-MCNC: 20 UG/DL (ref 35–150)
LDLC SERPL CALC-MCNC: 48.2 MG/DL (ref 0–100)
MCH RBC QN AUTO: 22.8 PG (ref 26–34)
MCHC RBC AUTO-ENTMCNC: 29.3 G/DL (ref 30–36.5)
MCV RBC AUTO: 77.7 FL (ref 80–99)
NRBC # BLD: 0 K/UL (ref 0–0.01)
NRBC BLD-RTO: 0 PER 100 WBC
PLATELET # BLD AUTO: 379 K/UL (ref 150–400)
PMV BLD AUTO: 10.2 FL (ref 8.9–12.9)
POTASSIUM SERPL-SCNC: 4.7 MMOL/L (ref 3.5–5.1)
PROT SERPL-MCNC: 6.8 G/DL (ref 6.4–8.2)
RBC # BLD AUTO: 4.04 M/UL (ref 3.8–5.2)
SODIUM SERPL-SCNC: 134 MMOL/L (ref 136–145)
TIBC SERPL-MCNC: 511 UG/DL (ref 250–450)
TRIGL SERPL-MCNC: 89 MG/DL
VLDLC SERPL CALC-MCNC: 17.8 MG/DL
WBC # BLD AUTO: 5.9 K/UL (ref 3.6–11)

## 2025-01-23 NOTE — TELEPHONE ENCOUNTER
Called patient and discussed labs, low iron and anemia.  Agrees with starting iron supplement and repeat labs in 4-6 weeks after starting iron.    Luis Maxwell MD  [facility]  01/23/25

## 2025-02-10 ENCOUNTER — HOSPITAL ENCOUNTER (OUTPATIENT)
Facility: HOSPITAL | Age: 80
Discharge: HOME | End: 2025-02-13
Attending: FAMILY MEDICINE
Payer: MEDICARE

## 2025-02-10 VITALS — BODY MASS INDEX: 32.14 KG/M2 | WEIGHT: 200 LBS | HEIGHT: 66 IN

## 2025-02-10 DIAGNOSIS — Z12.31 ENCOUNTER FOR SCREENING MAMMOGRAM FOR MALIGNANT NEOPLASM OF BREAST: ICD-10-CM

## 2025-02-10 PROCEDURE — 77063 BREAST TOMOSYNTHESIS BI: CPT

## 2025-02-15 DIAGNOSIS — J45.20 MILD INTERMITTENT ASTHMA WITHOUT COMPLICATION: ICD-10-CM

## 2025-02-17 RX ORDER — MONTELUKAST SODIUM 10 MG/1
10 TABLET ORAL NIGHTLY
Qty: 90 TABLET | Refills: 1 | Status: SHIPPED | OUTPATIENT
Start: 2025-02-17

## 2025-02-21 DIAGNOSIS — D50.8 IRON DEFICIENCY ANEMIA SECONDARY TO INADEQUATE DIETARY IRON INTAKE: ICD-10-CM

## 2025-02-22 LAB
BASOPHILS # BLD: 0.06 K/UL (ref 0–0.1)
BASOPHILS NFR BLD: 1.1 % (ref 0–1)
DIFFERENTIAL METHOD BLD: ABNORMAL
EOSINOPHIL # BLD: 0.16 K/UL (ref 0–0.4)
EOSINOPHIL NFR BLD: 2.9 % (ref 0–7)
ERYTHROCYTE [DISTWIDTH] IN BLOOD BY AUTOMATED COUNT: 25.2 % (ref 11.5–14.5)
FERRITIN SERPL-MCNC: 19 NG/ML (ref 26–388)
HCT VFR BLD AUTO: 33.9 % (ref 35–47)
HGB BLD-MCNC: 10.3 G/DL (ref 11.5–16)
IMM GRANULOCYTES # BLD AUTO: 0.01 K/UL (ref 0–0.04)
IMM GRANULOCYTES NFR BLD AUTO: 0.2 % (ref 0–0.5)
IRON SATN MFR SERPL: 10 % (ref 20–50)
IRON SERPL-MCNC: 40 UG/DL (ref 35–150)
LYMPHOCYTES # BLD: 1.56 K/UL (ref 0.8–3.5)
LYMPHOCYTES NFR BLD: 28.3 % (ref 12–49)
MCH RBC QN AUTO: 26.1 PG (ref 26–34)
MCHC RBC AUTO-ENTMCNC: 30.4 G/DL (ref 30–36.5)
MCV RBC AUTO: 85.8 FL (ref 80–99)
MONOCYTES # BLD: 0.58 K/UL (ref 0–1)
MONOCYTES NFR BLD: 10.5 % (ref 5–13)
NEUTS SEG # BLD: 3.13 K/UL (ref 1.8–8)
NEUTS SEG NFR BLD: 57 % (ref 32–75)
NRBC # BLD: 0 K/UL (ref 0–0.01)
NRBC BLD-RTO: 0 PER 100 WBC
PLATELET # BLD AUTO: 283 K/UL (ref 150–400)
PMV BLD AUTO: 10.1 FL (ref 8.9–12.9)
RBC # BLD AUTO: 3.95 M/UL (ref 3.8–5.2)
RBC MORPH BLD: ABNORMAL
TIBC SERPL-MCNC: 415 UG/DL (ref 250–450)
WBC # BLD AUTO: 5.5 K/UL (ref 3.6–11)

## 2025-03-04 ENCOUNTER — HOSPITAL ENCOUNTER (OUTPATIENT)
Facility: HOSPITAL | Age: 80
Discharge: HOME | End: 2025-03-07
Payer: MEDICARE

## 2025-03-04 ENCOUNTER — TRANSCRIBE ORDERS (OUTPATIENT)
Facility: HOSPITAL | Age: 80
End: 2025-03-04

## 2025-03-04 VITALS — HEIGHT: 66 IN | BODY MASS INDEX: 32.14 KG/M2 | WEIGHT: 200 LBS

## 2025-03-04 DIAGNOSIS — R92.8 ABNORMAL MAMMOGRAM OF LEFT BREAST: Primary | ICD-10-CM

## 2025-03-04 DIAGNOSIS — R92.8 ABNORMALITY OF LEFT BREAST ON SCREENING MAMMOGRAM: ICD-10-CM

## 2025-03-04 PROCEDURE — G0279 TOMOSYNTHESIS, MAMMO: HCPCS

## 2025-03-04 PROCEDURE — 76642 ULTRASOUND BREAST LIMITED: CPT

## 2025-03-05 DIAGNOSIS — E78.00 HYPERCHOLESTEREMIA: Primary | ICD-10-CM

## 2025-03-05 RX ORDER — ATORVASTATIN CALCIUM 40 MG/1
40 TABLET, FILM COATED ORAL DAILY
Qty: 90 TABLET | Refills: 1 | Status: SHIPPED | OUTPATIENT
Start: 2025-03-05

## 2025-03-05 NOTE — PROGRESS NOTES
Medication Reordered.    Luis Maxwell MD  [facility]  03/05/25

## 2025-04-22 ENCOUNTER — OFFICE VISIT (OUTPATIENT)
Facility: CLINIC | Age: 80
End: 2025-04-22
Payer: MEDICARE

## 2025-04-22 VITALS
TEMPERATURE: 97.3 F | HEART RATE: 60 BPM | SYSTOLIC BLOOD PRESSURE: 120 MMHG | OXYGEN SATURATION: 95 % | RESPIRATION RATE: 18 BRPM | HEIGHT: 66 IN | WEIGHT: 203.8 LBS | DIASTOLIC BLOOD PRESSURE: 74 MMHG | BODY MASS INDEX: 32.75 KG/M2

## 2025-04-22 DIAGNOSIS — D50.8 IRON DEFICIENCY ANEMIA SECONDARY TO INADEQUATE DIETARY IRON INTAKE: ICD-10-CM

## 2025-04-22 DIAGNOSIS — E78.00 HYPERCHOLESTEREMIA: ICD-10-CM

## 2025-04-22 DIAGNOSIS — E55.9 VITAMIN D DEFICIENCY: ICD-10-CM

## 2025-04-22 DIAGNOSIS — J45.20 MILD INTERMITTENT ASTHMA WITHOUT COMPLICATION: Primary | ICD-10-CM

## 2025-04-22 DIAGNOSIS — K27.9 PUD (PEPTIC ULCER DISEASE): ICD-10-CM

## 2025-04-22 DIAGNOSIS — M19.90 ARTHRITIS PAIN: ICD-10-CM

## 2025-04-22 PROCEDURE — G8399 PT W/DXA RESULTS DOCUMENT: HCPCS | Performed by: FAMILY MEDICINE

## 2025-04-22 PROCEDURE — 1123F ACP DISCUSS/DSCN MKR DOCD: CPT | Performed by: FAMILY MEDICINE

## 2025-04-22 PROCEDURE — 1090F PRES/ABSN URINE INCON ASSESS: CPT | Performed by: FAMILY MEDICINE

## 2025-04-22 PROCEDURE — 99214 OFFICE O/P EST MOD 30 MIN: CPT | Performed by: FAMILY MEDICINE

## 2025-04-22 PROCEDURE — 1159F MED LIST DOCD IN RCRD: CPT | Performed by: FAMILY MEDICINE

## 2025-04-22 PROCEDURE — G2211 COMPLEX E/M VISIT ADD ON: HCPCS | Performed by: FAMILY MEDICINE

## 2025-04-22 PROCEDURE — 1036F TOBACCO NON-USER: CPT | Performed by: FAMILY MEDICINE

## 2025-04-22 PROCEDURE — G8427 DOCREV CUR MEDS BY ELIG CLIN: HCPCS | Performed by: FAMILY MEDICINE

## 2025-04-22 PROCEDURE — 1160F RVW MEDS BY RX/DR IN RCRD: CPT | Performed by: FAMILY MEDICINE

## 2025-04-22 PROCEDURE — G8417 CALC BMI ABV UP PARAM F/U: HCPCS | Performed by: FAMILY MEDICINE

## 2025-04-22 RX ORDER — FLUTICASONE FUROATE 200 UG/1
POWDER RESPIRATORY (INHALATION)
COMMUNITY
Start: 2025-03-10

## 2025-04-22 ASSESSMENT — PATIENT HEALTH QUESTIONNAIRE - PHQ9
2. FEELING DOWN, DEPRESSED OR HOPELESS: NOT AT ALL
1. LITTLE INTEREST OR PLEASURE IN DOING THINGS: NOT AT ALL
SUM OF ALL RESPONSES TO PHQ QUESTIONS 1-9: 0

## 2025-04-22 ASSESSMENT — ENCOUNTER SYMPTOMS
BACK PAIN: 0
APNEA: 0
CHEST TIGHTNESS: 0
ABDOMINAL DISTENTION: 0
ABDOMINAL PAIN: 0

## 2025-04-22 NOTE — PROGRESS NOTES
[facility]    History of Present Illness:   Nina Solomon is a 79 y.o. female with history of PUD, HLD, GERD, Vitamin D   CC: Follow up   History provided by patient and Records    HPI:  Gastroesophageal Reflux:  Current control of Symptoms: Stable  Primary symptoms: heartburn  Hiatal Hernia: No  Current Medications: Prilosec  The patient has no history melena or bright red blood in the stools.  The patient avoids high dose aspirin and NSAID therapy.  The patient is aware of diet changes needed, elevating the head of the bed and appropriate use of antacids.      Asthma: Overall well controlled.  Not using Albuterol or Arnuity regularly but has on hand.  No recent issues with coughing or wheezing.  Singulair taking daily.    Hypertriglyceridemia Follow up:   Cardiovascular risks for her are: hyperlipidemia.   Current Medications:  atorvastatin - 40 MG               Compliance: Yes              Myalgias: No              Fatigue: No              Other side effects: No     Wt Readings from Last 3 Encounters:   04/22/25 92.4 kg (203 lb 12.8 oz)   03/04/25 90.7 kg (200 lb)   02/10/25 90.7 kg (200 lb)       Lab Results   Component Value Date/Time    CHOL 127 01/22/2025 10:45 AM    HDL 61 01/22/2025 10:45 AM    LDL 48.2 01/22/2025 10:45 AM    LDL 68.4 03/21/2024 09:15 AM    VLDL 17.8 01/22/2025 10:45 AM      Lab Results   Component Value Date/Time    ALT 27 01/22/2025 10:45 AM    AST 20 01/22/2025 10:45 AM         Health Maintenance  Health Maintenance Due   Topic Date Due    Respiratory Syncytial Virus (RSV) Pregnant or age 60 yrs+ (1 - 1-dose 75+ series) Never done    DTaP/Tdap/Td vaccine (2 - Td or Tdap) 07/13/2021    COVID-19 Vaccine (4 - 2024-25 season) 09/01/2024    Annual Wellness Visit (Medicare)  03/22/2025       Past Medical, Family, and Social History:     Current Outpatient Medications on File Prior to Visit   Medication Sig Dispense Refill    ARNUITY ELLIPTA 200 MCG/ACT [facility] INHALE ONE

## 2025-04-23 LAB
25(OH)D3 SERPL-MCNC: 49.9 NG/ML (ref 30–100)
ALBUMIN SERPL-MCNC: 3.8 G/DL (ref 3.5–5)
ALBUMIN/GLOB SERPL: 1.6 (ref 1.1–2.2)
ALP SERPL-CCNC: 87 U/L (ref 45–117)
ALT SERPL-CCNC: 30 U/L (ref 12–78)
ANION GAP SERPL CALC-SCNC: 6 MMOL/L (ref 2–12)
AST SERPL-CCNC: 24 U/L (ref 15–37)
BILIRUB SERPL-MCNC: 0.8 MG/DL (ref 0.2–1)
BUN SERPL-MCNC: 19 MG/DL (ref 6–20)
BUN/CREAT SERPL: 25 (ref 12–20)
CALCIUM SERPL-MCNC: 9.3 MG/DL (ref 8.5–10.1)
CHLORIDE SERPL-SCNC: 103 MMOL/L (ref 97–108)
CHOLEST SERPL-MCNC: 134 MG/DL
CO2 SERPL-SCNC: 28 MMOL/L (ref 21–32)
CREAT SERPL-MCNC: 0.77 MG/DL (ref 0.55–1.02)
ERYTHROCYTE [DISTWIDTH] IN BLOOD BY AUTOMATED COUNT: 19.2 % (ref 11.5–14.5)
FERRITIN SERPL-MCNC: 13 NG/ML (ref 26–388)
GLOBULIN SER CALC-MCNC: 2.4 G/DL (ref 2–4)
GLUCOSE SERPL-MCNC: 98 MG/DL (ref 65–100)
HCT VFR BLD AUTO: 37.3 % (ref 35–47)
HDLC SERPL-MCNC: 55 MG/DL
HDLC SERPL: 2.4 (ref 0–5)
HGB BLD-MCNC: 11.4 G/DL (ref 11.5–16)
IRON SATN MFR SERPL: 9 % (ref 20–50)
IRON SERPL-MCNC: 35 UG/DL (ref 35–150)
LDLC SERPL CALC-MCNC: 59 MG/DL (ref 0–100)
MCH RBC QN AUTO: 28.6 PG (ref 26–34)
MCHC RBC AUTO-ENTMCNC: 30.6 G/DL (ref 30–36.5)
MCV RBC AUTO: 93.7 FL (ref 80–99)
NRBC # BLD: 0 K/UL (ref 0–0.01)
NRBC BLD-RTO: 0 PER 100 WBC
PLATELET # BLD AUTO: 268 K/UL (ref 150–400)
PMV BLD AUTO: 10.5 FL (ref 8.9–12.9)
POTASSIUM SERPL-SCNC: 4.8 MMOL/L (ref 3.5–5.1)
PROT SERPL-MCNC: 6.2 G/DL (ref 6.4–8.2)
RBC # BLD AUTO: 3.98 M/UL (ref 3.8–5.2)
SODIUM SERPL-SCNC: 137 MMOL/L (ref 136–145)
TIBC SERPL-MCNC: 383 UG/DL (ref 250–450)
TRIGL SERPL-MCNC: 100 MG/DL
VLDLC SERPL CALC-MCNC: 20 MG/DL
WBC # BLD AUTO: 5.5 K/UL (ref 3.6–11)

## 2025-04-24 ENCOUNTER — RESULTS FOLLOW-UP (OUTPATIENT)
Facility: CLINIC | Age: 80
End: 2025-04-24

## 2025-09-05 ENCOUNTER — HOSPITAL ENCOUNTER (OUTPATIENT)
Facility: HOSPITAL | Age: 80
End: 2025-09-05
Payer: MEDICARE

## 2025-09-05 ENCOUNTER — HOSPITAL ENCOUNTER (OUTPATIENT)
Facility: HOSPITAL | Age: 80
Discharge: HOME | End: 2025-09-05
Payer: MEDICARE

## 2025-09-05 VITALS — WEIGHT: 203.8 LBS | BODY MASS INDEX: 32.75 KG/M2 | HEIGHT: 66 IN

## 2025-09-05 DIAGNOSIS — R92.8 FOLLOW-UP EXAMINATION OF ABNORMAL MAMMOGRAM: ICD-10-CM

## 2025-09-05 DIAGNOSIS — R92.8 ABNORMAL MAMMOGRAM OF LEFT BREAST: ICD-10-CM

## 2025-09-05 PROCEDURE — G0279 TOMOSYNTHESIS, MAMMO: HCPCS

## 2025-09-06 ENCOUNTER — RESULTS FOLLOW-UP (OUTPATIENT)
Facility: CLINIC | Age: 80
End: 2025-09-06